# Patient Record
Sex: FEMALE | Race: WHITE | Employment: OTHER | URBAN - METROPOLITAN AREA
[De-identification: names, ages, dates, MRNs, and addresses within clinical notes are randomized per-mention and may not be internally consistent; named-entity substitution may affect disease eponyms.]

---

## 2018-03-19 ENCOUNTER — OFFICE VISIT (OUTPATIENT)
Dept: FAMILY MEDICINE CLINIC | Age: 78
End: 2018-03-19
Payer: MEDICARE

## 2018-03-19 VITALS
SYSTOLIC BLOOD PRESSURE: 134 MMHG | OXYGEN SATURATION: 97 % | RESPIRATION RATE: 16 BRPM | HEART RATE: 70 BPM | DIASTOLIC BLOOD PRESSURE: 70 MMHG | WEIGHT: 191 LBS | TEMPERATURE: 97.9 F | BODY MASS INDEX: 30.7 KG/M2 | HEIGHT: 66 IN

## 2018-03-19 DIAGNOSIS — R49.0 PERSISTENT HOARSENESS: Primary | ICD-10-CM

## 2018-03-19 PROCEDURE — G8484 FLU IMMUNIZE NO ADMIN: HCPCS | Performed by: NURSE PRACTITIONER

## 2018-03-19 PROCEDURE — G8400 PT W/DXA NO RESULTS DOC: HCPCS | Performed by: NURSE PRACTITIONER

## 2018-03-19 PROCEDURE — G8417 CALC BMI ABV UP PARAM F/U: HCPCS | Performed by: NURSE PRACTITIONER

## 2018-03-19 PROCEDURE — 4040F PNEUMOC VAC/ADMIN/RCVD: CPT | Performed by: NURSE PRACTITIONER

## 2018-03-19 PROCEDURE — 3017F COLORECTAL CA SCREEN DOC REV: CPT | Performed by: NURSE PRACTITIONER

## 2018-03-19 PROCEDURE — 1036F TOBACCO NON-USER: CPT | Performed by: NURSE PRACTITIONER

## 2018-03-19 PROCEDURE — 1090F PRES/ABSN URINE INCON ASSESS: CPT | Performed by: NURSE PRACTITIONER

## 2018-03-19 PROCEDURE — 99213 OFFICE O/P EST LOW 20 MIN: CPT | Performed by: NURSE PRACTITIONER

## 2018-03-19 PROCEDURE — G8427 DOCREV CUR MEDS BY ELIG CLIN: HCPCS | Performed by: NURSE PRACTITIONER

## 2018-03-19 PROCEDURE — 1123F ACP DISCUSS/DSCN MKR DOCD: CPT | Performed by: NURSE PRACTITIONER

## 2018-03-19 ASSESSMENT — ENCOUNTER SYMPTOMS
COUGH: 0
SHORTNESS OF BREATH: 0
HEARTBURN: 0
NAUSEA: 0
VOMITING: 0
CONSTIPATION: 0
WHEEZING: 0
DIARRHEA: 0
SORE THROAT: 0

## 2018-03-19 NOTE — PROGRESS NOTES
HPI:  Patient comes in today for   Chief Complaint   Patient presents with    Pharyngitis     pt is here for follow up pt denies any pain states \"it feels tight\"    . Complains of intermittent hoarseness. States her throat is no longer sore but does feel tight at times. Prior to Visit Medications    Medication Sig Taking? Authorizing Provider   lisinopril-hydrochlorothiazide (PRINZIDE;ZESTORETIC) 10-12.5 MG per tablet Take 1 tablet by mouth daily Yes Wilson Wade NP   atorvastatin (LIPITOR) 10 MG tablet Take 1 tablet by mouth daily Yes Aubrey Pedroza DO   albuterol sulfate  (90 BASE) MCG/ACT inhaler Inhale 2 puffs into the lungs every 6 hours as needed for Wheezing Yes Historical Provider, MD   estradiol (ESTRACE) 0.1 MG/GM vaginal cream Place 2 g vaginally daily Yes Historical Provider, MD   vitamin B-12 (CYANOCOBALAMIN) 1000 MCG tablet Take 1,000 mcg by mouth once a week 2x/weekly Yes Historical Provider, MD   aspirin 81 MG chewable tablet Take 81 mg by mouth daily Yes Historical Provider, MD   Omega 3 1000 MG CAPS Take by mouth Yes Historical Provider, MD   Cholecalciferol (VITAMIN D3) 400 UNIT/ML LIQD Take 5 drops by mouth daily Yes Historical Provider, MD   COCONUT OIL PO Take by mouth Yes Historical Provider, MD         No Known Allergies      Review of Systems  Review of Systems   Constitutional: Negative for malaise/fatigue and weight loss. HENT: Positive for congestion. Negative for ear pain and sore throat. Respiratory: Negative for cough, shortness of breath and wheezing. Cardiovascular: Negative for chest pain and palpitations. Gastrointestinal: Negative for constipation, diarrhea, heartburn, nausea and vomiting. Neurological: Positive for dizziness. Negative for weakness and headaches.          VS:  /70   Pulse 70   Temp 97.9 °F (36.6 °C) (Oral)   Resp 16   Ht 5' 6\" (1.676 m)   Wt 191 lb (86.6 kg)   SpO2 97%   BMI 30.83 kg/m²     Physical Exam  Physical

## 2018-04-13 ENCOUNTER — OFFICE VISIT (OUTPATIENT)
Dept: ENT CLINIC | Age: 78
End: 2018-04-13
Payer: MEDICARE

## 2018-04-13 VITALS
WEIGHT: 195.6 LBS | BODY MASS INDEX: 31.43 KG/M2 | SYSTOLIC BLOOD PRESSURE: 123 MMHG | HEART RATE: 62 BPM | DIASTOLIC BLOOD PRESSURE: 55 MMHG | HEIGHT: 66 IN

## 2018-04-13 DIAGNOSIS — R49.0 HOARSENESS: Primary | ICD-10-CM

## 2018-04-13 PROCEDURE — G8417 CALC BMI ABV UP PARAM F/U: HCPCS | Performed by: OTOLARYNGOLOGY

## 2018-04-13 PROCEDURE — 31575 DIAGNOSTIC LARYNGOSCOPY: CPT | Performed by: OTOLARYNGOLOGY

## 2018-04-13 PROCEDURE — 99204 OFFICE O/P NEW MOD 45 MIN: CPT | Performed by: OTOLARYNGOLOGY

## 2018-04-13 PROCEDURE — 3017F COLORECTAL CA SCREEN DOC REV: CPT | Performed by: OTOLARYNGOLOGY

## 2018-04-13 PROCEDURE — G8427 DOCREV CUR MEDS BY ELIG CLIN: HCPCS | Performed by: OTOLARYNGOLOGY

## 2018-04-13 PROCEDURE — 1036F TOBACCO NON-USER: CPT | Performed by: OTOLARYNGOLOGY

## 2018-04-13 PROCEDURE — 1123F ACP DISCUSS/DSCN MKR DOCD: CPT | Performed by: OTOLARYNGOLOGY

## 2018-04-13 PROCEDURE — 4040F PNEUMOC VAC/ADMIN/RCVD: CPT | Performed by: OTOLARYNGOLOGY

## 2018-04-13 PROCEDURE — 1090F PRES/ABSN URINE INCON ASSESS: CPT | Performed by: OTOLARYNGOLOGY

## 2018-04-13 PROCEDURE — G8400 PT W/DXA NO RESULTS DOC: HCPCS | Performed by: OTOLARYNGOLOGY

## 2018-04-13 ASSESSMENT — ENCOUNTER SYMPTOMS
SINUS PAIN: 0
SORE THROAT: 1
COUGH: 0
VOMITING: 0
NAUSEA: 0

## 2018-04-18 ENCOUNTER — OFFICE VISIT (OUTPATIENT)
Dept: FAMILY MEDICINE CLINIC | Age: 78
End: 2018-04-18
Payer: MEDICARE

## 2018-04-18 VITALS
WEIGHT: 197 LBS | RESPIRATION RATE: 18 BRPM | OXYGEN SATURATION: 97 % | HEIGHT: 66 IN | BODY MASS INDEX: 31.66 KG/M2 | TEMPERATURE: 98.1 F | HEART RATE: 82 BPM | DIASTOLIC BLOOD PRESSURE: 82 MMHG | SYSTOLIC BLOOD PRESSURE: 124 MMHG

## 2018-04-18 DIAGNOSIS — Z23 IMMUNIZATION DUE: ICD-10-CM
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
## 2018-04-18 PROCEDURE — G8400 PT W/DXA NO RESULTS DOC: HCPCS | Performed by: NURSE PRACTITIONER

## 2018-04-18 PROCEDURE — 1090F PRES/ABSN URINE INCON ASSESS: CPT | Performed by: NURSE PRACTITIONER

## 2018-04-18 PROCEDURE — 4040F PNEUMOC VAC/ADMIN/RCVD: CPT | Performed by: NURSE PRACTITIONER

## 2018-04-18 PROCEDURE — G8427 DOCREV CUR MEDS BY ELIG CLIN: HCPCS | Performed by: NURSE PRACTITIONER

## 2018-04-18 PROCEDURE — 1123F ACP DISCUSS/DSCN MKR DOCD: CPT | Performed by: NURSE PRACTITIONER

## 2018-04-18 PROCEDURE — 99213 OFFICE O/P EST LOW 20 MIN: CPT | Performed by: NURSE PRACTITIONER

## 2018-04-18 PROCEDURE — 1036F TOBACCO NON-USER: CPT | Performed by: NURSE PRACTITIONER

## 2018-04-18 PROCEDURE — 90715 TDAP VACCINE 7 YRS/> IM: CPT | Performed by: NURSE PRACTITIONER

## 2018-04-18 PROCEDURE — G8417 CALC BMI ABV UP PARAM F/U: HCPCS | Performed by: NURSE PRACTITIONER

## 2018-04-18 PROCEDURE — 90471 IMMUNIZATION ADMIN: CPT | Performed by: NURSE PRACTITIONER

## 2018-04-18 PROCEDURE — 3017F COLORECTAL CA SCREEN DOC REV: CPT | Performed by: NURSE PRACTITIONER

## 2018-04-18 ASSESSMENT — ENCOUNTER SYMPTOMS
COUGH: 0
CONSTIPATION: 0
WHEEZING: 0
DOUBLE VISION: 0
DIARRHEA: 0
SHORTNESS OF BREATH: 0
PHOTOPHOBIA: 0
BLURRED VISION: 0
NAUSEA: 0
VOMITING: 0

## 2018-04-26 ASSESSMENT — ENCOUNTER SYMPTOMS
SHORTNESS OF BREATH: 0
HEARTBURN: 0
VOMITING: 0
BLURRED VISION: 0
DOUBLE VISION: 0
COUGH: 0

## 2018-06-12 ENCOUNTER — OFFICE VISIT (OUTPATIENT)
Dept: FAMILY MEDICINE CLINIC | Age: 78
End: 2018-06-12
Payer: MEDICARE

## 2018-06-12 VITALS
WEIGHT: 198 LBS | TEMPERATURE: 98 F | RESPIRATION RATE: 16 BRPM | DIASTOLIC BLOOD PRESSURE: 68 MMHG | BODY MASS INDEX: 31.82 KG/M2 | HEART RATE: 69 BPM | SYSTOLIC BLOOD PRESSURE: 126 MMHG | HEIGHT: 66 IN | OXYGEN SATURATION: 97 %

## 2018-06-12 DIAGNOSIS — Z91.81 AT HIGH RISK FOR FALLS: ICD-10-CM

## 2018-06-12 DIAGNOSIS — H57.11 PERIORBITAL PAIN, RIGHT: Primary | ICD-10-CM

## 2018-06-12 PROCEDURE — 4040F PNEUMOC VAC/ADMIN/RCVD: CPT | Performed by: NURSE PRACTITIONER

## 2018-06-12 PROCEDURE — 1036F TOBACCO NON-USER: CPT | Performed by: NURSE PRACTITIONER

## 2018-06-12 PROCEDURE — G8400 PT W/DXA NO RESULTS DOC: HCPCS | Performed by: NURSE PRACTITIONER

## 2018-06-12 PROCEDURE — 3017F COLORECTAL CA SCREEN DOC REV: CPT | Performed by: NURSE PRACTITIONER

## 2018-06-12 PROCEDURE — 99213 OFFICE O/P EST LOW 20 MIN: CPT | Performed by: NURSE PRACTITIONER

## 2018-06-12 PROCEDURE — G8427 DOCREV CUR MEDS BY ELIG CLIN: HCPCS | Performed by: NURSE PRACTITIONER

## 2018-06-12 PROCEDURE — 1090F PRES/ABSN URINE INCON ASSESS: CPT | Performed by: NURSE PRACTITIONER

## 2018-06-12 PROCEDURE — G8417 CALC BMI ABV UP PARAM F/U: HCPCS | Performed by: NURSE PRACTITIONER

## 2018-06-12 PROCEDURE — 1123F ACP DISCUSS/DSCN MKR DOCD: CPT | Performed by: NURSE PRACTITIONER

## 2018-06-12 ASSESSMENT — ENCOUNTER SYMPTOMS
WHEEZING: 0
CONSTIPATION: 0
SHORTNESS OF BREATH: 0
NAUSEA: 0
EYE REDNESS: 0
BACK PAIN: 0
DOUBLE VISION: 0
COUGH: 0
DIARRHEA: 0
EYE PAIN: 1
BLURRED VISION: 0
EYE DISCHARGE: 0
VOMITING: 0

## 2018-06-13 ENCOUNTER — HOSPITAL ENCOUNTER (OUTPATIENT)
Dept: GENERAL RADIOLOGY | Age: 78
Discharge: HOME | End: 2018-06-15
Payer: MEDICARE

## 2018-06-13 ENCOUNTER — HOSPITAL ENCOUNTER (OUTPATIENT)
Age: 78
Discharge: HOME | End: 2018-06-15
Payer: MEDICARE

## 2018-06-13 DIAGNOSIS — H57.11 PERIORBITAL PAIN, RIGHT: ICD-10-CM

## 2018-06-13 PROCEDURE — 70150 X-RAY EXAM OF FACIAL BONES: CPT

## 2018-08-17 ENCOUNTER — TELEPHONE (OUTPATIENT)
Dept: FAMILY MEDICINE CLINIC | Age: 78
End: 2018-08-17

## 2018-08-17 DIAGNOSIS — Z12.31 ENCOUNTER FOR SCREENING MAMMOGRAM FOR MALIGNANT NEOPLASM OF BREAST: Primary | ICD-10-CM

## 2018-08-24 ENCOUNTER — HOSPITAL ENCOUNTER (OUTPATIENT)
Dept: MAMMOGRAPHY | Age: 78
Discharge: HOME | End: 2018-08-26
Payer: MEDICARE

## 2018-08-24 DIAGNOSIS — Z12.31 ENCOUNTER FOR SCREENING MAMMOGRAM FOR MALIGNANT NEOPLASM OF BREAST: ICD-10-CM

## 2018-08-24 PROCEDURE — 77063 BREAST TOMOSYNTHESIS BI: CPT

## 2018-09-24 ENCOUNTER — OFFICE VISIT (OUTPATIENT)
Dept: FAMILY MEDICINE CLINIC | Age: 78
End: 2018-09-24
Payer: MEDICARE

## 2018-09-24 ENCOUNTER — HOSPITAL ENCOUNTER (OUTPATIENT)
Age: 78
Discharge: HOME | End: 2018-09-24
Payer: MEDICARE

## 2018-09-24 VITALS
SYSTOLIC BLOOD PRESSURE: 122 MMHG | RESPIRATION RATE: 14 BRPM | DIASTOLIC BLOOD PRESSURE: 72 MMHG | BODY MASS INDEX: 31.66 KG/M2 | TEMPERATURE: 97.7 F | HEART RATE: 70 BPM | HEIGHT: 66 IN | WEIGHT: 197 LBS | OXYGEN SATURATION: 97 %

## 2018-09-24 DIAGNOSIS — I10 ESSENTIAL HYPERTENSION: ICD-10-CM

## 2018-09-24 DIAGNOSIS — Z00.00 ROUTINE GENERAL MEDICAL EXAMINATION AT A HEALTH CARE FACILITY: ICD-10-CM

## 2018-09-24 DIAGNOSIS — E78.2 MIXED HYPERLIPIDEMIA: ICD-10-CM

## 2018-09-24 DIAGNOSIS — Z23 IMMUNIZATION DUE: ICD-10-CM

## 2018-09-24 DIAGNOSIS — I10 ESSENTIAL HYPERTENSION: Primary | ICD-10-CM

## 2018-09-24 LAB
BASOPHILS ABSOLUTE: 0.02 E9/L (ref 0–0.2)
BASOPHILS RELATIVE PERCENT: 0.4 % (ref 0–2)
CHOLESTEROL, TOTAL: 181 MG/DL (ref 0–199)
EOSINOPHILS ABSOLUTE: 0.07 E9/L (ref 0.05–0.5)
EOSINOPHILS RELATIVE PERCENT: 1.6 % (ref 0–6)
HCT VFR BLD CALC: 40.7 % (ref 34–48)
HDLC SERPL-MCNC: 64 MG/DL
HEMOGLOBIN: 13.6 G/DL (ref 11.5–15.5)
IMMATURE GRANULOCYTES #: 0.01 E9/L
IMMATURE GRANULOCYTES %: 0.2 % (ref 0–5)
LDL CHOLESTEROL CALCULATED: 96 MG/DL (ref 0–99)
LYMPHOCYTES ABSOLUTE: 1.08 E9/L (ref 1.5–4)
LYMPHOCYTES RELATIVE PERCENT: 24 % (ref 20–42)
MCH RBC QN AUTO: 29.1 PG (ref 26–35)
MCHC RBC AUTO-ENTMCNC: 33.4 % (ref 32–34.5)
MCV RBC AUTO: 87 FL (ref 80–99.9)
MONOCYTES ABSOLUTE: 0.43 E9/L (ref 0.1–0.95)
MONOCYTES RELATIVE PERCENT: 9.6 % (ref 2–12)
NEUTROPHILS ABSOLUTE: 2.89 E9/L (ref 1.8–7.3)
NEUTROPHILS RELATIVE PERCENT: 64.2 % (ref 43–80)
PDW BLD-RTO: 12.8 FL (ref 11.5–15)
PLATELET # BLD: 189 E9/L (ref 130–450)
PMV BLD AUTO: 11.1 FL (ref 7–12)
RBC # BLD: 4.68 E12/L (ref 3.5–5.5)
T4 FREE: 1.27 NG/DL (ref 0.93–1.7)
TRIGL SERPL-MCNC: 103 MG/DL (ref 0–149)
TSH SERPL DL<=0.05 MIU/L-ACNC: 2.26 UIU/ML (ref 0.27–4.2)
VLDLC SERPL CALC-MCNC: 21 MG/DL
WBC # BLD: 4.5 E9/L (ref 4.5–11.5)

## 2018-09-24 PROCEDURE — 80061 LIPID PANEL: CPT

## 2018-09-24 PROCEDURE — 84443 ASSAY THYROID STIM HORMONE: CPT

## 2018-09-24 PROCEDURE — 85025 COMPLETE CBC W/AUTO DIFF WBC: CPT

## 2018-09-24 PROCEDURE — G0008 ADMIN INFLUENZA VIRUS VAC: HCPCS | Performed by: FAMILY MEDICINE

## 2018-09-24 PROCEDURE — G0438 PPPS, INITIAL VISIT: HCPCS | Performed by: FAMILY MEDICINE

## 2018-09-24 PROCEDURE — 90662 IIV NO PRSV INCREASED AG IM: CPT | Performed by: FAMILY MEDICINE

## 2018-09-24 PROCEDURE — 36415 COLL VENOUS BLD VENIPUNCTURE: CPT

## 2018-09-24 PROCEDURE — 4040F PNEUMOC VAC/ADMIN/RCVD: CPT | Performed by: FAMILY MEDICINE

## 2018-09-24 PROCEDURE — 84439 ASSAY OF FREE THYROXINE: CPT

## 2018-09-24 ASSESSMENT — ANXIETY QUESTIONNAIRES: GAD7 TOTAL SCORE: 0

## 2018-09-24 ASSESSMENT — PATIENT HEALTH QUESTIONNAIRE - PHQ9
SUM OF ALL RESPONSES TO PHQ QUESTIONS 1-9: 0
SUM OF ALL RESPONSES TO PHQ QUESTIONS 1-9: 0

## 2018-09-24 NOTE — PROGRESS NOTES
Medicare Annual Wellness Visit  Name: Augusta Ac Date: 2018   MRN: <[id number]> Sex: Female   Age: 78 y.o. Ethnicity: Non-/Non    : 1940 Race: Matt Rivers is here for Medicare AWV    Screenings for behavioral, psychosocial and functional/safety risks, and cognitive dysfunction are all negative except as indicated below. These results, as well as other patient data from the [address] [facility] [address] form, are documented in Flowsheets linked to this Encounter. No Known Allergies  Prior to Visit Medications    Medication Sig Taking? Authorizing Provider   SODIUM CHLORIDE, HYPERTONIC, OP Apply to eye daily Yes Historical Provider, MD   lisinopril-hydrochlorothiazide (PRINZIDE;ZESTORETIC) 10-12.5 MG per tablet Take 1 tablet by mouth daily Yes MARVIN Toledo - CNP   atorvastatin (LIPITOR) 10 MG tablet Take 1 tablet by mouth daily Yes Cyndie Espinosa DO   albuterol sulfate  (90 BASE) MCG/ACT inhaler Inhale 2 puffs into the lungs every 6 hours as needed for Wheezing Yes Historical Provider, MD   vitamin B-12 (CYANOCOBALAMIN) 1000 MCG tablet Take 1,000 mcg by mouth once a week 2x/weekly Yes Historical Provider, MD   aspirin 81 MG chewable tablet Take 81 mg by mouth daily Yes Historical Provider, MD   Omega 3 1000 MG CAPS Take by mouth Yes Historical Provider, MD   Cholecalciferol (VITAMIN D3) 400 UNIT/ML LIQD Take 5 drops by mouth daily Yes Historical Provider, MD   COCONUT OIL PO Take by mouth Yes Historical Provider, MD   estradiol (ESTRACE) 0.1 MG/GM vaginal cream Place 2 g vaginally daily  Historical Provider, MD     Past Medical History:   Diagnosis Date    Asthma     Hernia     Hypertension      Past Surgical History:   Procedure Laterality Date    BLADDER SURGERY      DILATION AND CURETTAGE OF UTERUS      FRACTURE SURGERY      right arm    HYSTERECTOMY      KNEE CARTILAGE SURGERY       No family history on file.     CareTeam (Including ordered. Positive Risk Factor Screenings with Interventions:     Health Habits/Nutrition:  Health Habits/Nutrition  Do you exercise for at least 20 minutes 2-3 times per week?: (!) No  Have you lost any weight without trying in the past 3 months?: No  Do you eat fewer than 2 meals per day?: No  Have you seen a dentist within the past year?: Yes  Body mass index is 31.8 kg/m². Health Habits/Nutrition Interventions:  · None indicated    Personalized Preventive Plan   Current Health Maintenance Status  Immunization History   Administered Date(s) Administered    Influenza Vaccine, unspecified formulation 08/31/2017    Influenza, High Dose (Fluzone 65 yrs and older) 11/02/2015, 09/19/2016    Pneumococcal 13-valent Conjugate ([id number]) 03/04/2016    Pneumococcal Polysaccharide ([id number]) 09/20/2017    Tdap (Boostrix, Adacel) 04/18/2018        Health Maintenance   Topic Date Due    Shingles Vaccine (1 of 2 - 2 Dose Series) 09/25/1992    DEXA (modify frequency per FRAX score)  09/25/2007    Flu vaccine (1) 09/01/2018    Potassium monitoring  10/05/2018    Creatinine monitoring  10/05/2018    Lipid screen  10/05/2022    Colon cancer screen colonoscopy  12/16/2026    DTaP/Tdap/Td vaccine (2 - Td) 04/18/2028    Pneumococcal low/med risk  Completed     Recommendations for Preventive Services Due: see orders and patient instructions/AVS.  .   Recommended screening schedule for the next 5-10 years is provided to the patient in written form: see Patient Instructions/AVS.

## 2018-11-26 DIAGNOSIS — E78.00 PURE HYPERCHOLESTEROLEMIA: ICD-10-CM

## 2018-11-26 RX ORDER — ATORVASTATIN CALCIUM 10 MG/1
10 TABLET, FILM COATED ORAL DAILY
Qty: 90 TABLET | Refills: 3 | Status: SHIPPED | OUTPATIENT
Start: 2018-11-26 | End: 2019-12-30 | Stop reason: SDUPTHER

## 2019-01-07 ENCOUNTER — OFFICE VISIT (OUTPATIENT)
Dept: FAMILY MEDICINE CLINIC | Age: 79
End: 2019-01-07
Payer: MEDICARE

## 2019-01-07 VITALS
DIASTOLIC BLOOD PRESSURE: 80 MMHG | TEMPERATURE: 98.6 F | OXYGEN SATURATION: 95 % | BODY MASS INDEX: 31.5 KG/M2 | HEIGHT: 66 IN | SYSTOLIC BLOOD PRESSURE: 128 MMHG | HEART RATE: 82 BPM | WEIGHT: 196 LBS

## 2019-01-07 DIAGNOSIS — J32.9 CHRONIC SINUSITIS, UNSPECIFIED LOCATION: Primary | ICD-10-CM

## 2019-01-07 PROCEDURE — 1123F ACP DISCUSS/DSCN MKR DOCD: CPT | Performed by: NURSE PRACTITIONER

## 2019-01-07 PROCEDURE — G8482 FLU IMMUNIZE ORDER/ADMIN: HCPCS | Performed by: NURSE PRACTITIONER

## 2019-01-07 PROCEDURE — 1101F PT FALLS ASSESS-DOCD LE1/YR: CPT | Performed by: NURSE PRACTITIONER

## 2019-01-07 PROCEDURE — 4040F PNEUMOC VAC/ADMIN/RCVD: CPT | Performed by: NURSE PRACTITIONER

## 2019-01-07 PROCEDURE — 1036F TOBACCO NON-USER: CPT | Performed by: NURSE PRACTITIONER

## 2019-01-07 PROCEDURE — G8400 PT W/DXA NO RESULTS DOC: HCPCS | Performed by: NURSE PRACTITIONER

## 2019-01-07 PROCEDURE — G8427 DOCREV CUR MEDS BY ELIG CLIN: HCPCS | Performed by: NURSE PRACTITIONER

## 2019-01-07 PROCEDURE — 99213 OFFICE O/P EST LOW 20 MIN: CPT | Performed by: NURSE PRACTITIONER

## 2019-01-07 PROCEDURE — 1090F PRES/ABSN URINE INCON ASSESS: CPT | Performed by: NURSE PRACTITIONER

## 2019-01-07 PROCEDURE — G8417 CALC BMI ABV UP PARAM F/U: HCPCS | Performed by: NURSE PRACTITIONER

## 2019-01-07 ASSESSMENT — ENCOUNTER SYMPTOMS
CONSTIPATION: 0
WHEEZING: 0
SINUS PRESSURE: 0
NAUSEA: 0
SHORTNESS OF BREATH: 0
SINUS PAIN: 0
SORE THROAT: 0
COUGH: 1
VOMITING: 0
DIARRHEA: 0

## 2019-03-11 DIAGNOSIS — I10 ESSENTIAL HYPERTENSION: ICD-10-CM

## 2019-03-11 RX ORDER — LISINOPRIL AND HYDROCHLOROTHIAZIDE 12.5; 1 MG/1; MG/1
1 TABLET ORAL DAILY
Qty: 90 TABLET | Refills: 3 | Status: SHIPPED
Start: 2019-03-11 | End: 2020-03-16 | Stop reason: SDUPTHER

## 2019-04-03 ENCOUNTER — TELEPHONE (OUTPATIENT)
Dept: ADMINISTRATIVE | Age: 79
End: 2019-04-03

## 2019-04-03 ENCOUNTER — OFFICE VISIT (OUTPATIENT)
Dept: FAMILY MEDICINE CLINIC | Age: 79
End: 2019-04-03
Payer: MEDICARE

## 2019-04-03 VITALS
TEMPERATURE: 97.9 F | OXYGEN SATURATION: 96 % | DIASTOLIC BLOOD PRESSURE: 82 MMHG | BODY MASS INDEX: 30.95 KG/M2 | RESPIRATION RATE: 16 BRPM | SYSTOLIC BLOOD PRESSURE: 122 MMHG | WEIGHT: 192.6 LBS | HEIGHT: 66 IN | HEART RATE: 83 BPM

## 2019-04-03 DIAGNOSIS — R68.89 THROAT SYMPTOM: Primary | ICD-10-CM

## 2019-04-03 DIAGNOSIS — R05.9 COUGH: ICD-10-CM

## 2019-04-03 PROCEDURE — 99213 OFFICE O/P EST LOW 20 MIN: CPT | Performed by: NURSE PRACTITIONER

## 2019-04-03 PROCEDURE — 1090F PRES/ABSN URINE INCON ASSESS: CPT | Performed by: NURSE PRACTITIONER

## 2019-04-03 PROCEDURE — 4040F PNEUMOC VAC/ADMIN/RCVD: CPT | Performed by: NURSE PRACTITIONER

## 2019-04-03 PROCEDURE — G8510 SCR DEP NEG, NO PLAN REQD: HCPCS | Performed by: NURSE PRACTITIONER

## 2019-04-03 PROCEDURE — G8417 CALC BMI ABV UP PARAM F/U: HCPCS | Performed by: NURSE PRACTITIONER

## 2019-04-03 PROCEDURE — 1123F ACP DISCUSS/DSCN MKR DOCD: CPT | Performed by: NURSE PRACTITIONER

## 2019-04-03 PROCEDURE — 1036F TOBACCO NON-USER: CPT | Performed by: NURSE PRACTITIONER

## 2019-04-03 PROCEDURE — G8427 DOCREV CUR MEDS BY ELIG CLIN: HCPCS | Performed by: NURSE PRACTITIONER

## 2019-04-03 PROCEDURE — G8400 PT W/DXA NO RESULTS DOC: HCPCS | Performed by: NURSE PRACTITIONER

## 2019-04-03 ASSESSMENT — ENCOUNTER SYMPTOMS
DIARRHEA: 0
TROUBLE SWALLOWING: 1
WHEEZING: 0
CONSTIPATION: 0
SHORTNESS OF BREATH: 0
COUGH: 1
VOMITING: 0
NAUSEA: 0

## 2019-04-03 ASSESSMENT — PATIENT HEALTH QUESTIONNAIRE - PHQ9
2. FEELING DOWN, DEPRESSED OR HOPELESS: 0
SUM OF ALL RESPONSES TO PHQ QUESTIONS 1-9: 0
SUM OF ALL RESPONSES TO PHQ9 QUESTIONS 1 & 2: 0
SUM OF ALL RESPONSES TO PHQ QUESTIONS 1-9: 0
1. LITTLE INTEREST OR PLEASURE IN DOING THINGS: 0

## 2019-04-03 NOTE — PROGRESS NOTES
HPI:  Patient comes intoday for   Chief Complaint   Patient presents with    Oral Swelling     fish oil pill got stuck in throat, still feels like it is stuck in throat. happened yesterday. .    States the capsule got stuck yesterday and still feels like there is something there when she swallows. Patient brought a pill like the one that is stuck    Prior to Visit Medications    Medication Sig Taking? Authorizing Provider   lisinopril-hydrochlorothiazide (PRINZIDE;ZESTORETIC) 10-12.5 MG per tablet Take 1 tablet by mouth daily Yes Micky Miguel,    atorvastatin (LIPITOR) 10 MG tablet Take 1 tablet by mouth daily Yes MARVIN Sidhu - CNP   albuterol sulfate  (90 BASE) MCG/ACT inhaler Inhale 2 puffs into the lungs every 6 hours as needed for Wheezing Yes Historical Provider, MD   vitamin B-12 (CYANOCOBALAMIN) 1000 MCG tablet Take 1,000 mcg by mouth once a week 2x/weekly Yes Historical Provider, MD   aspirin 81 MG chewable tablet Take 81 mg by mouth daily Yes Historical Provider, MD   Omega 3 1000 MG CAPS Take by mouth Yes Historical Provider, MD   Cholecalciferol (VITAMIN D3) 400 UNIT/ML LIQD Take 5 drops by mouth daily Yes Historical Provider, MD   COCONUT OIL PO Take by mouth Yes Historical Provider, MD         No Known Allergies      Review of Systems  Review of Systems   Constitutional: Negative for activity change, appetite change and unexpected weight change. HENT: Positive for congestion and trouble swallowing (able to swallow, just feels like there is something stuck on the left). Respiratory: Positive for cough (with recent sinus congestion). Negative for shortness of breath and wheezing. Cardiovascular: Negative for chest pain and palpitations. Gastrointestinal: Negative for constipation, diarrhea, nausea and vomiting. Neurological: Positive for headaches (intermittently). Negative for weakness and light-headedness.          VS:  /82   Pulse 83   Temp 97.9 °F (36.6 °C) (Oral)   Resp 16   Ht 5' 6\" (1.676 m)   Wt 192 lb 9.6 oz (87.4 kg)   SpO2 96%   BMI 31.09 kg/m²     Physical Exam  Physical Exam   Constitutional: She is oriented to person, place, and time. She appears well-developed and well-nourished. No distress. HENT:   Head: Normocephalic and atraumatic. Mouth/Throat: Oropharynx is clear and moist.   Neck: No tracheal deviation present. No thyromegaly present. Cardiovascular: Normal rate, regular rhythm, normal heart sounds and intact distal pulses. No murmur heard. Pulmonary/Chest: Effort normal and breath sounds normal. No respiratory distress. She has no wheezes. She has no rales. She exhibits no tenderness. Abdominal: Soft. Bowel sounds are normal. There is no tenderness. Lymphadenopathy:     She has no cervical adenopathy. Neurological: She is alert and oriented to person, place, and time. Skin: Skin is warm and dry. Psychiatric: She has a normal mood and affect. Her behavior is normal.     Patient also examined by Dr. Jung Flowers      Assessment/Plan:  Sylvia Barnes was seen today for oral swelling. Diagnoses and all orders for this visit:    Throat symptom  -fish oil suspended in water, does not dissolve. Broke capsule open and contents empties but shell remain firm and intact. -contacted Dr. Helms Heads office, as she is established with that practice, and was advised if she can swallow there is nothing stuck and she did not need to be seen.   If it was in the esophagus that would be GI  -advised patient that if she develops difficulty swallowing or breathing she is to go to ER    Cough  -will try sinus medication and if does not improve she is to contact the office for change in BP medications        Greater than 15  Minutes was spent with patient and more than 50% of the time was spent face to facecounseling and educating regarding diagnoses

## 2019-04-03 NOTE — TELEPHONE ENCOUNTER
Received phone call from staff at Dr. Cassia Isabel office stating patient is currently in there office and had swallowed a fish oil pill that got stuck in her throat and since patient was established here they were inquiring if Dr. Dell Kurtz would see patient; discussed with Dr. Dell Kurtz who stated if patient is swallowing saliva/able to spit that it has gone down it would just feel as If it's lodged in the throat that the feeling would go away but if she is unable to swallow spit and is in the esophagus pt would need to see gastro. Staff at Dr. Cassia Isabel office was notified.

## 2019-05-27 ENCOUNTER — HOSPITAL ENCOUNTER (EMERGENCY)
Age: 79
Discharge: HOME | End: 2019-05-27
Payer: MEDICARE

## 2019-05-27 VITALS
HEIGHT: 66 IN | SYSTOLIC BLOOD PRESSURE: 137 MMHG | TEMPERATURE: 98.6 F | OXYGEN SATURATION: 96 % | WEIGHT: 192 LBS | RESPIRATION RATE: 20 BRPM | HEART RATE: 82 BPM | BODY MASS INDEX: 30.86 KG/M2 | DIASTOLIC BLOOD PRESSURE: 68 MMHG

## 2019-05-27 DIAGNOSIS — S61.210A LACERATION OF RIGHT INDEX FINGER WITHOUT FOREIGN BODY WITHOUT DAMAGE TO NAIL, INITIAL ENCOUNTER: Primary | ICD-10-CM

## 2019-05-27 PROCEDURE — 12001 RPR S/N/AX/GEN/TRNK 2.5CM/<: CPT

## 2019-05-27 PROCEDURE — 90471 IMMUNIZATION ADMIN: CPT | Performed by: PHYSICIAN ASSISTANT

## 2019-05-27 PROCEDURE — 99282 EMERGENCY DEPT VISIT SF MDM: CPT

## 2019-05-27 PROCEDURE — 90715 TDAP VACCINE 7 YRS/> IM: CPT | Performed by: PHYSICIAN ASSISTANT

## 2019-05-27 PROCEDURE — 6360000002 HC RX W HCPCS: Performed by: PHYSICIAN ASSISTANT

## 2019-05-27 RX ADMIN — TETANUS TOXOID, REDUCED DIPHTHERIA TOXOID AND ACELLULAR PERTUSSIS VACCINE, ADSORBED 0.5 ML: 5; 2.5; 8; 8; 2.5 SUSPENSION INTRAMUSCULAR at 14:21

## 2019-05-27 NOTE — ED PROVIDER NOTES
Independent MLP      HPI:  5/27/19,   Time: 2:10 PM         Sharron Gomez is a 70 y.o. female presenting to the ED for laceration to right index finger, beginning few hours ago. The complaint has been persistent, moderate in severity, and worsened by nothing. Patient presents today after cutting her right index finger on a sharp blade. She states that she had one of the old fashion straightedge cutters and had removed the old cassie and inadvertently set it on her bedside table. She states that she reached over this afternoon and grabbed the blade with her right hand cutting the pad of her right index finger. The patient states she hasn't been able to stop bleeding. She is on Veronica aspirin. Moving the finger well. Denies loss of sensation. ROS:     Constitutional: Negative for fever and chills  HENT: Negative for ear pain, sore throat and sinus pressure  Eyes: Negative for pain, discharge and redness  Respiratory:  Negative for shortness of breath, cough and wheezing  Cardiovascular: Negative for CP, edema or palpitations  Gastrointestinal: Negative for nausea, vomiting, diarrhea and abdominal distention  Genitourinary: Negative for dysuria and frequency  Musculoskeletal:  See HPI  Skin: See HPI  Neurological: Negative for weakness and headaches  Hematological: Negative for adenopathy    All other systems reviewed and are negative      -------------------------------- PAST HISTORY ----------------------------------  Past Medical History:  has a past medical history of Asthma, Hernia, and Hypertension. Past Surgical History:  has a past surgical history that includes Hysterectomy; Knee cartilage surgery; Bladder surgery; Dilation and curettage of uterus; and fracture surgery. Social History:  reports that she quit smoking about 39 years ago. Her smoking use included cigarettes. She has a 8.00 pack-year smoking history.  She has never used smokeless tobacco. She reports that she does not drink alcohol or use drugs. Family History: family history is not on file. The patients home medications have been reviewed. Allergies: Patient has no known allergies. --------------------------------- RESULTS ------------------------------------------  All laboratory and radiology results have been personally reviewed by myself   LABS:  No results found for this visit on 05/27/19. RADIOLOGY:  Interpreted by Radiologist.  No orders to display       ----------------- NURSING NOTES AND VITALS REVIEWED ---------------   The nursing notes within the ED encounter and vital signs as below have been reviewed. /68   Pulse 82   Temp 98.6 °F (37 °C)   Resp 20   Ht 5' 6\" (1.676 m)   Wt 192 lb (87.1 kg)   SpO2 96%   BMI 30.99 kg/m²   Oxygen Saturation Interpretation: Normal      --------------------------------PHYSICAL EXAM------------------------------------      Constitutional/General: Alert and oriented x3, well appearing, non toxic in NAD  Head: NC/AT  Eyes: PERRL, EOMI  Mouth: Oropharynx clear, handling secretions, no trismus  Neck: Supple, full ROM, no meningeal signs  Pulmonary: Lungs clear to auscultation bilaterally, no wheezes, rales, or rhonchi. Not in respiratory distress  Cardiovascular:  Regular rate and rhythm, no murmurs, gallops, or rubs. 2+ distal pulses  Extremities: Moves all extremities x 4. Bleeding laceration distal palmar surface right index finger. Gaping noted as well. ROM DIP intact. Normal sensation. Warm and well perfused  Skin: See above  Neurologic: GCS 15,  Intact. No focal deficits  Psych: Normal Affect      ------------------------ ED COURSE/MEDICAL DECISION MAKING----------------------  Medications   Tetanus-Diphth-Acell Pertussis (BOOSTRIX) injection 0.5 mL (0.5 mLs Intramuscular Given 5/27/19 1421)       Procedure:   1 cm laceration distal palmar surface right index finger. Area cleansed with Betadine and numbed with 1 cc 1 % Lidocaine.    3 4-0 Ethilon sutures placed. Tolerated well. DSD applied. Performed by [facility]      Medical Decision Making:    See procedure. Discussed wound care. Td updated. Sutures out I 10-14 days time. F/U PCP for suture removal in 10-14 days       Counseling: The emergency provider has spoken with the patient and discussed todays results, in addition to providing specific details for the plan of care and counseling regarding the diagnosis and prognosis. Questions are answered at this time and they are agreeable with the plan.      ------------------------ IMPRESSION AND DISPOSITION -------------------------------    IMPRESSION  1.  Laceration of right index finger without foreign body without damage to nail, initial encounter        DISPOSITION  Disposition: Discharge to home  Patient condition is stable                   Donal Morocho PA-C  05/27/19 [id number]

## 2019-05-27 NOTE — ED NOTES
The wound was cleansed after being sutured. A thin layer of antibiotic ointment was applied and covered with a DSD. Tubex gauze was placed over the dressing. CMS intact.       Salvador Myles RN  05/27/19 [id number]

## 2019-05-28 ENCOUNTER — CARE COORDINATION (OUTPATIENT)
Dept: CARE COORDINATION | Age: 79
End: 2019-05-28

## 2019-05-28 NOTE — CARE COORDINATION
Ambulatory Care Coordination ED Follow up Call  Spoke with patient at [phone] (H)(M) identified myself RN with [facility] ([facility]), calling to see how patient is doing after being discharged from the ED. Reason for ED Visit:  Laceration  Diagnosis:  Laceration of right index finger without foreign body without damage to nail, initial encounter  Care Management Risk Score:  2  How are you feeling?:improved  Patient Reports the following:  Patient states she went to the ED as she accidentally cut her pointer finger on the right had with a razor blade and it was bleeding. Patient states she only has pain at the laceration site if she touches the area. Did you call your PCP prior to going to the ED? No was the holiday. Post Discharge Status:   What health concerns do you have since you left the Emergency Room?  none      Do you have wounds that you are caring for at home? Patient states she received 3 stitches and a dressing was applied. Patient states she was instructed to remove the dressing today and apply a band-aide. Patient states band-aide is clean dry and intact. Do you have a follow up appt scheduled? no    Patient transferred to pre-service to schedule ED follow up appointment:  Yes    Review of Instructions:   Do you have any questions regarding your discharge instructions?: No    Medications:   Do you have any questions about your medications? No new medications were ordered. Are you taking your medications as directed? If not-why? No new medications were ordered. Can you afford your medications? No new medications were ordered. ADLS:   Do you need assistance of any kind at home? No   What assistance is needed?   N/A        Keep scheduled appointment as listed below:     Future Appointments   Date Time Provider Lashonda Cervantes   9/25/2019  8:00 AM Theron Palumbo DO [facility]

## 2019-06-06 ENCOUNTER — HOSPITAL ENCOUNTER (OUTPATIENT)
Dept: GENERAL RADIOLOGY | Age: 79
Discharge: HOME | End: 2019-06-08
Payer: MEDICARE

## 2019-06-06 ENCOUNTER — OFFICE VISIT (OUTPATIENT)
Dept: FAMILY MEDICINE CLINIC | Age: 79
End: 2019-06-06
Payer: MEDICARE

## 2019-06-06 ENCOUNTER — HOSPITAL ENCOUNTER (OUTPATIENT)
Age: 79
Discharge: HOME | End: 2019-06-06
Payer: MEDICARE

## 2019-06-06 VITALS
DIASTOLIC BLOOD PRESSURE: 70 MMHG | WEIGHT: 190 LBS | HEART RATE: 57 BPM | RESPIRATION RATE: 16 BRPM | BODY MASS INDEX: 30.53 KG/M2 | HEIGHT: 66 IN | TEMPERATURE: 98.3 F | OXYGEN SATURATION: 93 % | SYSTOLIC BLOOD PRESSURE: 128 MMHG

## 2019-06-06 DIAGNOSIS — M25.551 PAIN OF RIGHT HIP JOINT: ICD-10-CM

## 2019-06-06 DIAGNOSIS — Z48.02 ENCOUNTER FOR REMOVAL OF SUTURES: Primary | ICD-10-CM

## 2019-06-06 DIAGNOSIS — M76.31 ILIOTIBIAL BAND SYNDROME OF RIGHT SIDE: ICD-10-CM

## 2019-06-06 PROCEDURE — G8417 CALC BMI ABV UP PARAM F/U: HCPCS | Performed by: FAMILY MEDICINE

## 2019-06-06 PROCEDURE — 4040F PNEUMOC VAC/ADMIN/RCVD: CPT | Performed by: FAMILY MEDICINE

## 2019-06-06 PROCEDURE — 1090F PRES/ABSN URINE INCON ASSESS: CPT | Performed by: FAMILY MEDICINE

## 2019-06-06 PROCEDURE — G8400 PT W/DXA NO RESULTS DOC: HCPCS | Performed by: FAMILY MEDICINE

## 2019-06-06 PROCEDURE — 73502 X-RAY EXAM HIP UNI 2-3 VIEWS: CPT

## 2019-06-06 PROCEDURE — 1036F TOBACCO NON-USER: CPT | Performed by: FAMILY MEDICINE

## 2019-06-06 PROCEDURE — 99214 OFFICE O/P EST MOD 30 MIN: CPT | Performed by: FAMILY MEDICINE

## 2019-06-06 PROCEDURE — 1123F ACP DISCUSS/DSCN MKR DOCD: CPT | Performed by: FAMILY MEDICINE

## 2019-06-06 PROCEDURE — G8427 DOCREV CUR MEDS BY ELIG CLIN: HCPCS | Performed by: FAMILY MEDICINE

## 2019-06-06 ASSESSMENT — ENCOUNTER SYMPTOMS
ABDOMINAL PAIN: 0
APNEA: 0
CHEST TIGHTNESS: 0
BACK PAIN: 0
ABDOMINAL DISTENTION: 0
EYE DISCHARGE: 0

## 2019-06-06 NOTE — PROGRESS NOTES
HPI:  Patient comes in today for   Chief Complaint   Patient presents with    Finger Injury     ER f/u visit. Pt cut her Rt index finger on a razor blade on 5/27/19. Pt was treated with 3 outer sutures. Pt states the finger does hurt a small amount but has no swelling.  Leg Pain     Pt has c/o upper leg pain. Pt has trouble putting any weight on the leg or walking. Denies any injuries. Onset 9 months but has gotten worse over the past 2-3 weeks. Would like an XR. Rates pain 9/10. .      3 Sutures removed from the tip of Right [place]. Dry and neurovascular intact. Review of Systems  Review of Systems   Constitutional: Positive for activity change (decreased). Negative for appetite change and chills. HENT: Negative for congestion and drooling. Eyes: Negative for discharge. Respiratory: Negative for apnea and chest tightness. Gastrointestinal: Negative for abdominal distention and abdominal pain. Genitourinary: Negative for difficulty urinating. Musculoskeletal: Positive for arthralgias (Right knee and right hip.). Negative for back pain. Neurological: Negative for dizziness. Hematological: Negative for adenopathy. Psychiatric/Behavioral: Negative for agitation, behavioral problems, confusion and decreased concentration. PE:  VS:  /70   Pulse 57   Temp 98.3 °F (36.8 °C) (Oral)   Resp 16   Ht 5' 6\" (1.676 m)   Wt 190 lb (86.2 kg)   LMP  (LMP Unknown)   SpO2 93%   Breastfeeding? No   BMI 30.67 kg/m²   Physical Exam   Constitutional: She appears well-developed. HENT:   Head: Normocephalic. Right Ear: External ear normal.   Left Ear: External ear normal.   Eyes: Pupils are equal, round, and reactive to light. Neck: Normal range of motion. No tracheal deviation present. No thyromegaly present. Cardiovascular: Normal rate and regular rhythm. Exam reveals no friction rub. No murmur heard. Pulmonary/Chest: Effort normal. No respiratory distress.    Abdominal: Soft. She exhibits no distension. There is no tenderness. Musculoskeletal: Normal range of motion. Tender along the Right ITB and the pain in the Right Hip joint. Neurological: She is alert. No cranial nerve deficit. Skin: Skin is warm. Capillary refill takes less than 2 seconds. Psychiatric: She has a normal mood and affect. Her behavior is normal.       ASSESSMENT/PLAN:    1. Encounter for removal of sutures    2. Pain of right hip joint  - External Referral To Physical Therapy    3. Iliotibial band syndrome of right side  - External Referral To Physical Therapy                      DOMINIC Lechuga.

## 2019-08-19 DIAGNOSIS — Z12.39 SCREENING FOR BREAST CANCER: Primary | ICD-10-CM

## 2019-08-27 ENCOUNTER — HOSPITAL ENCOUNTER (OUTPATIENT)
Dept: MAMMOGRAPHY | Age: 79
Discharge: HOME | End: 2019-08-29
Payer: MEDICARE

## 2019-08-27 DIAGNOSIS — Z12.39 SCREENING FOR BREAST CANCER: ICD-10-CM

## 2019-08-27 PROCEDURE — 77063 BREAST TOMOSYNTHESIS BI: CPT

## 2019-09-25 ENCOUNTER — OFFICE VISIT (OUTPATIENT)
Dept: FAMILY MEDICINE CLINIC | Age: 79
End: 2019-09-25
Payer: MEDICARE

## 2019-09-25 VITALS
DIASTOLIC BLOOD PRESSURE: 64 MMHG | BODY MASS INDEX: 29.99 KG/M2 | WEIGHT: 186.6 LBS | HEIGHT: 66 IN | TEMPERATURE: 98.1 F | HEART RATE: 64 BPM | RESPIRATION RATE: 16 BRPM | OXYGEN SATURATION: 98 % | SYSTOLIC BLOOD PRESSURE: 124 MMHG

## 2019-09-25 DIAGNOSIS — M89.9 DISORDER OF BONE: ICD-10-CM

## 2019-09-25 DIAGNOSIS — D31.32 NEVUS OF CHOROID OF LEFT EYE: ICD-10-CM

## 2019-09-25 DIAGNOSIS — E78.00 PURE HYPERCHOLESTEROLEMIA: ICD-10-CM

## 2019-09-25 DIAGNOSIS — M76.31 ILIOTIBIAL BAND SYNDROME OF RIGHT SIDE: Primary | ICD-10-CM

## 2019-09-25 DIAGNOSIS — S52.91XS CLOSED FRACTURE OF RIGHT FOREARM, SEQUELA: ICD-10-CM

## 2019-09-25 DIAGNOSIS — Z00.00 ROUTINE GENERAL MEDICAL EXAMINATION AT A HEALTH CARE FACILITY: ICD-10-CM

## 2019-09-25 DIAGNOSIS — Z13.820 SCREENING FOR OSTEOPOROSIS: ICD-10-CM

## 2019-09-25 PROCEDURE — 4040F PNEUMOC VAC/ADMIN/RCVD: CPT | Performed by: FAMILY MEDICINE

## 2019-09-25 PROCEDURE — G0439 PPPS, SUBSEQ VISIT: HCPCS | Performed by: FAMILY MEDICINE

## 2019-09-25 PROCEDURE — 1123F ACP DISCUSS/DSCN MKR DOCD: CPT | Performed by: FAMILY MEDICINE

## 2019-09-25 ASSESSMENT — PATIENT HEALTH QUESTIONNAIRE - PHQ9
SUM OF ALL RESPONSES TO PHQ QUESTIONS 1-9: 0
SUM OF ALL RESPONSES TO PHQ QUESTIONS 1-9: 0

## 2019-09-25 NOTE — PROGRESS NOTES
Medicare Annual Wellness Visit  Name: Macy Hoyos Date: 2019   MRN: <[id number]> Sex: Female   Age: 70 y.o. Ethnicity: Non-/Non    : 1940 Race: Eusebio Gomez is here for Medicare AWV    Screenings for behavioral, psychosocial and functional/safety risks, and cognitive dysfunction are all negative except as indicated below. These results, as well as other patient data from the [address] form, are documented in Flowsheets linked to this Encounter. No Known Allergies  Prior to Visit Medications    Medication Sig Taking? Authorizing Provider   Undecylenic Acid (GORDOCHOM) 25 % SOLN Apply topically Yes Historical Provider, MD   LAVENDER OIL PO Take by mouth Yes Historical Provider, MD   lisinopril-hydrochlorothiazide (PRINZIDE;ZESTORETIC) 10-12.5 MG per tablet Take 1 tablet by mouth daily Yes Ruth Smoker, DO   atorvastatin (LIPITOR) 10 MG tablet Take 1 tablet by mouth daily Yes MARVIN Bey - MANGO   albuterol sulfate  (90 BASE) MCG/ACT inhaler Inhale 2 puffs into the lungs every 6 hours as needed for Wheezing Yes Historical Provider, MD   vitamin B-12 (CYANOCOBALAMIN) 1000 MCG tablet Take 1,000 mcg by mouth once a week 2x/weekly Yes Historical Provider, MD   Cholecalciferol (VITAMIN D3) 400 UNIT/ML LIQD Take 5 drops by mouth daily Yes Historical Provider, MD   COCONUT OIL PO Take by mouth Yes Historical Provider, MD   aspirin 81 MG chewable tablet Take 81 mg by mouth daily  Historical Provider, MD     Past Medical History:   Diagnosis Date    Asthma     Hernia     Hypertension      Past Surgical History:   Procedure Laterality Date    BLADDER SURGERY      DILATION AND CURETTAGE OF UTERUS      FRACTURE SURGERY      right arm    HYSTERECTOMY      KNEE CARTILAGE SURGERY       No family history on file.     CareTeam (Including outside providers/suppliers regularly involved in providing care):   Patient Care Team:  Louis Da Silva Jeaneth Oseguera as PCP - General  Ailyn Crews DO as PCP - [facility]  Edith Tony MD  [id number] Jewel Smith MD as Surgeon (Ophthalmology)    Wt Readings from Last 3 Encounters:   09/25/19 186 lb 9.6 oz (84.6 kg)   06/06/19 190 lb (86.2 kg)   05/27/19 192 lb (87.1 kg)     Vitals:    09/25/19 0803   BP: 124/64   Pulse: 64   Resp: 16   Temp: 98.1 °F (36.7 °C)   TempSrc: Oral   SpO2: 98%   Weight: 186 lb 9.6 oz (84.6 kg)   Height: 5' 6\" (1.676 m)     Body mass index is 30.12 kg/m². Based upon direct observation of the patient, evaluation of cognition reveals recent and remote memory intact. General Appearance: alert and oriented to person, place and time, well developed and well- nourished, in no acute distress  Skin: warm and dry, no rash or erythema  Head: normocephalic and atraumatic  Eyes: pupils equal, round, and reactive to light, extraocular eye movements intact, conjunctivae normal  ENT: tympanic membrane, external ear and ear canal normal bilaterally, nose without deformity, nasal mucosa and turbinates normal without polyps  Neck: supple and non-tender without mass, no thyromegaly or thyroid nodules, no cervical lymphadenopathy  Pulmonary/Chest: clear to auscultation bilaterally- no wheezes, rales or rhonchi, normal air movement, no respiratory distress  Cardiovascular: normal rate, regular rhythm, normal S1 and S2, no murmurs, rubs, clicks, or gallops, distal pulses intact, no carotid bruits  Abdomen: soft, non-tender, non-distended, normal bowel sounds, no masses or organomegaly  Extremities: no cyanosis, clubbing or edema  Musculoskeletal: decreaed range of motion, no joint swelling, Positive hand joint deformity or tenderness  Neurologic: reflexes normal and symmetric, no cranial nerve deficit, gait, coordination and speech normal    Patient's complete Health Risk Assessment and screening values have been reviewed and are found in Flowsheets.  The following problems were reviewed today and where indicated follow up appointments were made and/or referrals ordered. Positive Risk Factor Screenings with Interventions:     Fall Risk:  Timed Up and Go Test > 12 seconds? (Complete if either Fall Risk answers are Yes): no  2 or more falls in past year?: (!) yes  Fall with injury in past year?: no  Fall Risk Interventions:    · none    Health Habits/Nutrition:  Health Habits/Nutrition  Do you exercise for at least 20 minutes 2-3 times per week?: Yes  Have you lost any weight without trying in the past 3 months?: No  Do you eat fewer than 2 meals per day?: No  Have you seen a dentist within the past year?: Yes  Body mass index is 30.12 kg/m².   Health Habits/Nutrition Interventions:  · none    Safety:  Safety  Do you have working smoke detectors?: Yes  Have all throw rugs been removed or fastened?: Yes  Do you have non-slip mats or surfaces in all bathtubs/showers?: (!) No  Do all of your stairways have a railing or banister?: Yes  Are your doorways, halls and stairs free of clutter?: Yes  Do you always fasten your seatbelt when you are in a car?: Yes  Safety Interventions:  · none    Personalized Preventive Plan   Current Health Maintenance Status  Immunization History   Administered Date(s) Administered    Influenza Vaccine, unspecified formulation 08/31/2017    Influenza, High Dose (Fluzone 65 yrs and older) 11/02/2015, 09/19/2016, 09/24/2018    Pneumococcal Conjugate 13-valent ([id number]) 03/04/2016    Pneumococcal Polysaccharide ([id number]) 09/20/2017    Tdap (Boostrix, Adacel) 04/18/2018, 05/27/2019        Health Maintenance   Topic Date Due    Shingles Vaccine (1 of 2) 09/25/1992    DEXA (modify frequency per FRAX score)  09/25/2007    Potassium monitoring  10/05/2018    Creatinine monitoring  10/05/2018    Annual Wellness Visit (AWV)  05/29/2019    Flu vaccine (1) 09/01/2019    Lipid screen  09/24/2019    DTaP/Tdap/Td vaccine (3 - Td) 05/27/2029    Pneumococcal 65+ years Vaccine  Completed     Recommendations for Preventive Services Due: see orders and patient instructions/AVS.  .   Recommended screening schedule for the next 5-10 years is provided to the patient in written form: see Patient Instructions/AVS.

## 2019-09-25 NOTE — PATIENT INSTRUCTIONS
Personalized Preventive Plan for Rosenda Clark - 9/25/2019  Medicare offers a range of preventive health benefits. Some of the tests and screenings are paid in full while other may be subject to a deductible, co-insurance, and/or copay. Some of these benefits include a comprehensive review of your medical history including lifestyle, illnesses that may run in your family, and various assessments and screenings as appropriate. After reviewing your medical record and screening and assessments performed today your provider may have ordered immunizations, labs, imaging, and/or referrals for you. A list of these orders (if applicable) as well as your Preventive Care list are included within your After Visit Summary for your review. Other Preventive Recommendations:    · A preventive eye exam performed by an eye specialist is recommended every 1-2 years to screen for glaucoma; cataracts, macular degeneration, and other eye disorders. · A preventive dental visit is recommended every 6 months. · Try to get at least 150 minutes of exercise per week or 10,000 steps per day on a pedometer . · Order or download the FREE \"Exercise & Physical Activity: Your Everyday Guide\" from [employer]. Call 0-284.684.6302 or search [employer] online. · You need 9613-1837 mg of calcium and 9003-7251 IU of vitamin D per day. It is possible to meet your calcium requirement with diet alone, but a vitamin D supplement is usually necessary to meet this goal.  · When exposed to the sun, use a sunscreen that protects against both UVA and UVB radiation with an SPF of 30 or greater. Reapply every 2 to 3 hours or after sweating, drying off with a towel, or swimming. · Always wear a seat belt when traveling in a car. Always wear a helmet when riding a bicycle or motorcycle.

## 2019-09-27 ENCOUNTER — HOSPITAL ENCOUNTER (OUTPATIENT)
Dept: MAMMOGRAPHY | Age: 79
Discharge: HOME | End: 2019-09-29
Payer: MEDICARE

## 2019-09-27 DIAGNOSIS — M89.9 DISORDER OF BONE: ICD-10-CM

## 2019-09-27 DIAGNOSIS — Z13.820 SCREENING FOR OSTEOPOROSIS: ICD-10-CM

## 2019-09-27 DIAGNOSIS — S52.91XS CLOSED FRACTURE OF RIGHT FOREARM, SEQUELA: ICD-10-CM

## 2019-09-27 PROCEDURE — 77080 DXA BONE DENSITY AXIAL: CPT

## 2019-09-28 ENCOUNTER — HOSPITAL ENCOUNTER (OUTPATIENT)
Age: 79
Discharge: HOME | End: 2019-09-28
Payer: MEDICARE

## 2019-09-28 DIAGNOSIS — E78.00 PURE HYPERCHOLESTEROLEMIA: ICD-10-CM

## 2019-09-28 LAB
ALBUMIN SERPL-MCNC: 4.2 G/DL (ref 3.5–5.2)
ALP BLD-CCNC: 145 U/L (ref 35–104)
ALT SERPL-CCNC: 15 U/L (ref 0–32)
ANION GAP SERPL CALCULATED.3IONS-SCNC: 10 MMOL/L (ref 7–16)
AST SERPL-CCNC: 16 U/L (ref 0–31)
BASOPHILS ABSOLUTE: 0.03 E9/L (ref 0–0.2)
BASOPHILS RELATIVE PERCENT: 0.7 % (ref 0–2)
BILIRUB SERPL-MCNC: 0.5 MG/DL (ref 0–1.2)
BUN BLDV-MCNC: 24 MG/DL (ref 8–23)
CALCIUM SERPL-MCNC: 9.4 MG/DL (ref 8.6–10.2)
CHLORIDE BLD-SCNC: 104 MMOL/L (ref 98–107)
CO2: 29 MMOL/L (ref 22–29)
CREAT SERPL-MCNC: 0.9 MG/DL (ref 0.5–1)
EOSINOPHILS ABSOLUTE: 0.04 E9/L (ref 0.05–0.5)
EOSINOPHILS RELATIVE PERCENT: 0.9 % (ref 0–6)
GFR AFRICAN AMERICAN: >60
GFR NON-AFRICAN AMERICAN: >60 ML/MIN/1.73
GLUCOSE BLD-MCNC: 102 MG/DL (ref 74–99)
HCT VFR BLD CALC: 40 % (ref 34–48)
HEMOGLOBIN: 13.2 G/DL (ref 11.5–15.5)
IMMATURE GRANULOCYTES #: 0.01 E9/L
IMMATURE GRANULOCYTES %: 0.2 % (ref 0–5)
LYMPHOCYTES ABSOLUTE: 1.13 E9/L (ref 1.5–4)
LYMPHOCYTES RELATIVE PERCENT: 24.6 % (ref 20–42)
MCH RBC QN AUTO: 29.8 PG (ref 26–35)
MCHC RBC AUTO-ENTMCNC: 33 % (ref 32–34.5)
MCV RBC AUTO: 90.3 FL (ref 80–99.9)
MONOCYTES ABSOLUTE: 0.33 E9/L (ref 0.1–0.95)
MONOCYTES RELATIVE PERCENT: 7.2 % (ref 2–12)
NEUTROPHILS ABSOLUTE: 3.06 E9/L (ref 1.8–7.3)
NEUTROPHILS RELATIVE PERCENT: 66.4 % (ref 43–80)
PDW BLD-RTO: 12.5 FL (ref 11.5–15)
PLATELET # BLD: 207 E9/L (ref 130–450)
PMV BLD AUTO: 11 FL (ref 7–12)
POTASSIUM SERPL-SCNC: 4.1 MMOL/L (ref 3.5–5)
RBC # BLD: 4.43 E12/L (ref 3.5–5.5)
SODIUM BLD-SCNC: 143 MMOL/L (ref 132–146)
TOTAL PROTEIN: 7.2 G/DL (ref 6.4–8.3)
WBC # BLD: 4.6 E9/L (ref 4.5–11.5)

## 2019-09-28 PROCEDURE — 85025 COMPLETE CBC W/AUTO DIFF WBC: CPT

## 2019-09-28 PROCEDURE — 80053 COMPREHEN METABOLIC PANEL: CPT

## 2019-09-28 PROCEDURE — 36415 COLL VENOUS BLD VENIPUNCTURE: CPT

## 2019-10-07 ENCOUNTER — TELEPHONE (OUTPATIENT)
Dept: FAMILY MEDICINE CLINIC | Age: 79
End: 2019-10-07

## 2019-10-07 DIAGNOSIS — M62.3 PARAPLEGIC IMMOBILITY SYNDROME: ICD-10-CM

## 2019-10-07 DIAGNOSIS — J45.20 MILD INTERMITTENT ASTHMA WITHOUT COMPLICATION: Primary | ICD-10-CM

## 2019-12-30 DIAGNOSIS — E78.00 PURE HYPERCHOLESTEROLEMIA: ICD-10-CM

## 2019-12-30 RX ORDER — ATORVASTATIN CALCIUM 10 MG/1
10 TABLET, FILM COATED ORAL DAILY
Qty: 90 TABLET | Refills: 3 | Status: SHIPPED
Start: 2019-12-30 | End: 2021-02-02 | Stop reason: SDUPTHER

## 2020-03-16 RX ORDER — LISINOPRIL AND HYDROCHLOROTHIAZIDE 12.5; 1 MG/1; MG/1
1 TABLET ORAL DAILY
Qty: 90 TABLET | Refills: 3 | Status: SHIPPED
Start: 2020-03-16 | End: 2021-03-15 | Stop reason: SDUPTHER

## 2020-10-05 ENCOUNTER — OFFICE VISIT (OUTPATIENT)
Dept: FAMILY MEDICINE CLINIC | Age: 80
End: 2020-10-05
Payer: MEDICARE

## 2020-10-05 VITALS
WEIGHT: 199 LBS | BODY MASS INDEX: 31.98 KG/M2 | DIASTOLIC BLOOD PRESSURE: 70 MMHG | SYSTOLIC BLOOD PRESSURE: 128 MMHG | RESPIRATION RATE: 16 BRPM | TEMPERATURE: 96.7 F | HEART RATE: 74 BPM | HEIGHT: 66 IN | OXYGEN SATURATION: 97 %

## 2020-10-05 PROCEDURE — 4040F PNEUMOC VAC/ADMIN/RCVD: CPT | Performed by: FAMILY MEDICINE

## 2020-10-05 PROCEDURE — G0439 PPPS, SUBSEQ VISIT: HCPCS | Performed by: FAMILY MEDICINE

## 2020-10-05 PROCEDURE — 1123F ACP DISCUSS/DSCN MKR DOCD: CPT | Performed by: FAMILY MEDICINE

## 2020-10-05 PROCEDURE — G8484 FLU IMMUNIZE NO ADMIN: HCPCS | Performed by: FAMILY MEDICINE

## 2020-10-05 ASSESSMENT — LIFESTYLE VARIABLES: HOW OFTEN DO YOU HAVE A DRINK CONTAINING ALCOHOL: 0

## 2020-10-05 ASSESSMENT — PATIENT HEALTH QUESTIONNAIRE - PHQ9
1. LITTLE INTEREST OR PLEASURE IN DOING THINGS: 0
SUM OF ALL RESPONSES TO PHQ QUESTIONS 1-9: 0
SUM OF ALL RESPONSES TO PHQ QUESTIONS 1-9: 0
2. FEELING DOWN, DEPRESSED OR HOPELESS: 0
SUM OF ALL RESPONSES TO PHQ9 QUESTIONS 1 & 2: 0

## 2020-10-05 NOTE — PROGRESS NOTES
Medicare Annual Wellness Visit  Name: Cindy Vieira Date: 10/5/2020   MRN: <[id number]> Sex: Female   Age: 68 y.o. Ethnicity: Non-/Non    : 1940 Race: Neomia Heading is here for Medicare AWV    Screenings for behavioral, psychosocial and functional/safety risks, and cognitive dysfunction are all negative except as indicated below. These results, as well as other patient data from the [address] form, are documented in Flowsheets linked to this Encounter. No Known Allergies      Prior to Visit Medications    Medication Sig Taking? Authorizing Provider   lisinopril-hydroCHLOROthiazide (PRINZIDE;ZESTORETIC) 10-12.5 MG per tablet Take 1 tablet by mouth daily Yes [place]ma [place], DO   atorvastatin (LIPITOR) 10 MG tablet Take 1 tablet by mouth daily Yes Burma [place], DO   Undecylenic Acid (GORDOCHOM) 25 % SOLN Apply topically Yes Historical Provider, MD   LAVENDER OIL PO Take by mouth Yes Historical Provider, MD   albuterol sulfate  (90 BASE) MCG/ACT inhaler Inhale 2 puffs into the lungs every 6 hours as needed for Wheezing Yes Historical Provider, MD   vitamin B-12 (CYANOCOBALAMIN) 1000 MCG tablet Take 1,000 mcg by mouth once a week 2x/weekly Yes Historical Provider, MD   aspirin 81 MG chewable tablet Take 81 mg by mouth daily Yes Historical Provider, MD   Cholecalciferol (VITAMIN D3) 400 UNIT/ML LIQD Take 5 drops by mouth daily Yes Historical Provider, MD   COCONUT OIL PO Take by mouth Yes Historical Provider, MD         Past Medical History:   Diagnosis Date    Asthma     Hernia     Hypertension        Past Surgical History:   Procedure Laterality Date    BLADDER SURGERY      DILATION AND CURETTAGE OF UTERUS      FRACTURE SURGERY      right arm    HYSTERECTOMY      KNEE CARTILAGE SURGERY         No family history on file.     CareTeam (Including outside providers/suppliers regularly involved in providing care):   Patient Care problems were reviewed today and where indicated follow up appointments were made and/or referrals ordered. Positive Risk Factor Screenings with Interventions:     Cognitive: Words recalled: 2 Words Recalled  Total Score Interpretation: Positive Mini-Cog  Cognitive Impairment Interventions:  · none    General Health:  General  In general, how would you say your health is?: Very Good  In the past 7 days, have you experienced any of the following? New or Increased Pain, New or Increased Fatigue, Loneliness, Social Isolation, Stress or Anger?: None of These  Do you get the social and emotional support that you need?: Yes  Do you have a Living Will?: (!) No  General Health Risk Interventions:  · none    Health Habits/Nutrition:  Health Habits/Nutrition  Do you exercise for at least 20 minutes 2-3 times per week?: (!) No  Have you lost any weight without trying in the past 3 months?: No  Do you eat fewer than 2 meals per day?: No  Have you seen a dentist within the past year?: Yes  Body mass index is 32.12 kg/m².   Health Habits/Nutrition Interventions:  · none    Hearing/Vision:  No exam data present  Hearing/Vision  Do you or your family notice any trouble with your hearing?: (!) Yes  Do you have difficulty driving, watching TV, or doing any of your daily activities because of your eyesight?: No  Have you had an eye exam within the past year?: Yes  Hearing/Vision Interventions:  · none    Safety:  Safety  Do you have working smoke detectors?: Yes  Have all throw rugs been removed or fastened?: Yes  Do you have non-slip mats or surfaces in all bathtubs/showers?: (!) No  Do all of your stairways have a railing or banister?: Yes  Are your doorways, halls and stairs free of clutter?: Yes  Do you always fasten your seatbelt when you are in a car?: Yes  Safety Interventions:  · none    Personalized Preventive Plan   Current Health Maintenance Status  Immunization History   Administered Date(s) Administered    Influenza Vaccine, unspecified formulation 08/31/2017    Influenza, High Dose (Fluzone 65 yrs and older) 11/02/2015, 09/19/2016, 09/24/2018    Pneumococcal Conjugate 13-valent ([id number]) 03/04/2016    Pneumococcal Polysaccharide ([id number]) 09/20/2017    Tdap (Boostrix, Adacel) 04/18/2018, 05/27/2019        Health Maintenance   Topic Date Due    Shingles Vaccine (1 of 2) 09/25/1992    Annual Wellness Visit (AWV)  05/29/2019    Lipid screen  09/24/2019    Flu vaccine (1) 09/01/2020    Potassium monitoring  09/28/2020    Creatinine monitoring  09/28/2020    DTaP/Tdap/Td vaccine (3 - Td) 05/27/2029    DEXA (modify frequency per FRAX score)  Completed    Pneumococcal 65+ years Vaccine  Completed    Hepatitis A vaccine  Aged Out    Hepatitis B vaccine  Aged Out    Hib vaccine  Aged Out    Meningococcal (ACWY) vaccine  Aged Out     Recommendations for Bliips Due: see orders and patient instructions/AVS.  . Recommended screening schedule for the next 5-10 years is provided to the patient in written form: see Patient Brayan Aldrich was seen today for medicare awv. Diagnoses and all orders for this visit:    Encounter for screening mammogram for malignant neoplasm of breast  -     ANJALI DIGITAL SCREEN W OR WO CAD BILATERAL;  Future    Mild intermittent asthma, unspecified whether complicated

## 2020-10-05 NOTE — PATIENT INSTRUCTIONS
Personalized Preventive Plan for Cesilia Castellon - 10/5/2020  Medicare offers a range of preventive health benefits. Some of the tests and screenings are paid in full while other may be subject to a deductible, co-insurance, and/or copay. Some of these benefits include a comprehensive review of your medical history including lifestyle, illnesses that may run in your family, and various assessments and screenings as appropriate. After reviewing your medical record and screening and assessments performed today your provider may have ordered immunizations, labs, imaging, and/or referrals for you. A list of these orders (if applicable) as well as your Preventive Care list are included within your After Visit Summary for your review. Other Preventive Recommendations:    · A preventive eye exam performed by an eye specialist is recommended every 1-2 years to screen for glaucoma; cataracts, macular degeneration, and other eye disorders. · A preventive dental visit is recommended every 6 months. · Try to get at least 150 minutes of exercise per week or 10,000 steps per day on a pedometer . · Order or download the FREE \"Exercise & Physical Activity: Your Everyday Guide\" from [employer]. Call 3-510.254.2661 or search [employer] online. · You need 7117-2702 mg of calcium and 9578-4253 IU of vitamin D per day. It is possible to meet your calcium requirement with diet alone, but a vitamin D supplement is usually necessary to meet this goal.  · When exposed to the sun, use a sunscreen that protects against both UVA and UVB radiation with an SPF of 30 or greater. Reapply every 2 to 3 hours or after sweating, drying off with a towel, or swimming. · Always wear a seat belt when traveling in a car. Always wear a helmet when riding a bicycle or motorcycle.

## 2020-10-13 ENCOUNTER — NURSE ONLY (OUTPATIENT)
Dept: FAMILY MEDICINE CLINIC | Age: 80
End: 2020-10-13
Payer: MEDICARE

## 2020-10-13 PROCEDURE — G0008 ADMIN INFLUENZA VIRUS VAC: HCPCS | Performed by: NURSE PRACTITIONER

## 2020-10-13 PROCEDURE — 90694 VACC AIIV4 NO PRSRV 0.5ML IM: CPT | Performed by: NURSE PRACTITIONER

## 2020-10-15 ENCOUNTER — HOSPITAL ENCOUNTER (OUTPATIENT)
Dept: MAMMOGRAPHY | Age: 80
Discharge: HOME | End: 2020-10-17
Payer: MEDICARE

## 2020-10-15 PROCEDURE — 77063 BREAST TOMOSYNTHESIS BI: CPT

## 2020-12-14 ENCOUNTER — TELEPHONE (OUTPATIENT)
Dept: ADMINISTRATIVE | Age: 80
End: 2020-12-14

## 2020-12-15 ENCOUNTER — TELEPHONE (OUTPATIENT)
Dept: FAMILY MEDICINE CLINIC | Age: 80
End: 2020-12-15

## 2020-12-15 NOTE — TELEPHONE ENCOUNTER
Pt states she hurt her throat with gargling with salt/vinager water. Has a sore throat now and would like to do the same thing but worried she may hurt her throat again. She is gargling once daily with the salt water apple cider mix. Pt has been tested for covid and came up negative.           Please advise

## 2020-12-28 ENCOUNTER — OFFICE VISIT (OUTPATIENT)
Dept: FAMILY MEDICINE CLINIC | Age: 80
End: 2020-12-28
Payer: MEDICARE

## 2020-12-28 VITALS
DIASTOLIC BLOOD PRESSURE: 78 MMHG | SYSTOLIC BLOOD PRESSURE: 120 MMHG | OXYGEN SATURATION: 98 % | WEIGHT: 202.6 LBS | BODY MASS INDEX: 32.7 KG/M2 | HEART RATE: 78 BPM | RESPIRATION RATE: 16 BRPM | TEMPERATURE: 97.1 F

## 2020-12-28 PROCEDURE — 1036F TOBACCO NON-USER: CPT | Performed by: FAMILY MEDICINE

## 2020-12-28 PROCEDURE — G8427 DOCREV CUR MEDS BY ELIG CLIN: HCPCS | Performed by: FAMILY MEDICINE

## 2020-12-28 PROCEDURE — G8417 CALC BMI ABV UP PARAM F/U: HCPCS | Performed by: FAMILY MEDICINE

## 2020-12-28 PROCEDURE — 1123F ACP DISCUSS/DSCN MKR DOCD: CPT | Performed by: FAMILY MEDICINE

## 2020-12-28 PROCEDURE — 4040F PNEUMOC VAC/ADMIN/RCVD: CPT | Performed by: FAMILY MEDICINE

## 2020-12-28 PROCEDURE — 1090F PRES/ABSN URINE INCON ASSESS: CPT | Performed by: FAMILY MEDICINE

## 2020-12-28 PROCEDURE — 99213 OFFICE O/P EST LOW 20 MIN: CPT | Performed by: FAMILY MEDICINE

## 2020-12-28 PROCEDURE — G8399 PT W/DXA RESULTS DOCUMENT: HCPCS | Performed by: FAMILY MEDICINE

## 2020-12-28 PROCEDURE — G8484 FLU IMMUNIZE NO ADMIN: HCPCS | Performed by: FAMILY MEDICINE

## 2020-12-28 ASSESSMENT — ENCOUNTER SYMPTOMS
VOMITING: 0
NAUSEA: 0
DIARRHEA: 0
ABDOMINAL PAIN: 0
BLOOD IN STOOL: 0
WHEEZING: 0
CONSTIPATION: 0
SHORTNESS OF BREATH: 0
COUGH: 0

## 2020-12-28 NOTE — PROGRESS NOTES
Normal breath sounds. No wheezing. Abdominal:      Palpations: Abdomen is soft. Musculoskeletal:         General: No tenderness. Comments: Can stand and walk and there is minimal pain at the Joint line Right knee. Skin:     Capillary Refill: Capillary refill takes less than 2 seconds. Findings: No erythema or rash. Neurological:      General: No focal deficit present. Mental Status: She is alert and oriented to person, place, and time. Deep Tendon Reflexes: Reflexes normal.      Comments:                Assessment/Plan:  Kiana Camejo was seen today for knee pain and abdominal pain. Diagnoses and all orders for this visit:    Internal derangement of right knee  -     XR KNEE RIGHT (3 VIEWS); Future          Estephania Diss DOMINIC Fontaine.

## 2021-02-02 DIAGNOSIS — E78.00 PURE HYPERCHOLESTEROLEMIA: ICD-10-CM

## 2021-02-02 RX ORDER — ATORVASTATIN CALCIUM 10 MG/1
10 TABLET, FILM COATED ORAL DAILY
Qty: 90 TABLET | Refills: 1 | Status: SHIPPED
Start: 2021-02-02 | End: 2021-09-03 | Stop reason: SDUPTHER

## 2021-03-15 DIAGNOSIS — I10 ESSENTIAL HYPERTENSION: ICD-10-CM

## 2021-03-15 RX ORDER — LISINOPRIL AND HYDROCHLOROTHIAZIDE 12.5; 1 MG/1; MG/1
1 TABLET ORAL DAILY
Qty: 90 TABLET | Refills: 0 | Status: SHIPPED
Start: 2021-03-15 | End: 2021-06-14 | Stop reason: SDUPTHER

## 2021-05-27 ENCOUNTER — OFFICE VISIT (OUTPATIENT)
Dept: FAMILY MEDICINE CLINIC | Age: 81
End: 2021-05-27
Payer: MEDICARE

## 2021-05-27 VITALS
DIASTOLIC BLOOD PRESSURE: 80 MMHG | BODY MASS INDEX: 30.6 KG/M2 | TEMPERATURE: 97.2 F | RESPIRATION RATE: 17 BRPM | OXYGEN SATURATION: 98 % | HEIGHT: 66 IN | SYSTOLIC BLOOD PRESSURE: 128 MMHG | WEIGHT: 190.4 LBS | HEART RATE: 89 BPM

## 2021-05-27 DIAGNOSIS — Z01.818 PRE-OP EVALUATION: ICD-10-CM

## 2021-05-27 DIAGNOSIS — E78.2 MIXED HYPERLIPIDEMIA: ICD-10-CM

## 2021-05-27 DIAGNOSIS — Z01.818 PRE-OP EVALUATION: Primary | ICD-10-CM

## 2021-05-27 DIAGNOSIS — I10 ESSENTIAL HYPERTENSION: ICD-10-CM

## 2021-05-27 DIAGNOSIS — H25.9 AGE-RELATED CATARACT OF BOTH EYES, UNSPECIFIED AGE-RELATED CATARACT TYPE: ICD-10-CM

## 2021-05-27 DIAGNOSIS — D31.32 NEVUS OF CHOROID OF LEFT EYE: ICD-10-CM

## 2021-05-27 PROCEDURE — G8417 CALC BMI ABV UP PARAM F/U: HCPCS | Performed by: FAMILY MEDICINE

## 2021-05-27 PROCEDURE — 99214 OFFICE O/P EST MOD 30 MIN: CPT | Performed by: FAMILY MEDICINE

## 2021-05-27 PROCEDURE — 1123F ACP DISCUSS/DSCN MKR DOCD: CPT | Performed by: FAMILY MEDICINE

## 2021-05-27 PROCEDURE — 1090F PRES/ABSN URINE INCON ASSESS: CPT | Performed by: FAMILY MEDICINE

## 2021-05-27 PROCEDURE — G8427 DOCREV CUR MEDS BY ELIG CLIN: HCPCS | Performed by: FAMILY MEDICINE

## 2021-05-27 PROCEDURE — 93000 ELECTROCARDIOGRAM COMPLETE: CPT | Performed by: FAMILY MEDICINE

## 2021-05-27 PROCEDURE — 1036F TOBACCO NON-USER: CPT | Performed by: FAMILY MEDICINE

## 2021-05-27 PROCEDURE — 4040F PNEUMOC VAC/ADMIN/RCVD: CPT | Performed by: FAMILY MEDICINE

## 2021-05-27 PROCEDURE — G8399 PT W/DXA RESULTS DOCUMENT: HCPCS | Performed by: FAMILY MEDICINE

## 2021-05-27 ASSESSMENT — ENCOUNTER SYMPTOMS
BLOOD IN STOOL: 0
WHEEZING: 0
DIARRHEA: 0
COUGH: 0
SHORTNESS OF BREATH: 0
NAUSEA: 0
CONSTIPATION: 0
VOMITING: 0
ABDOMINAL PAIN: 0

## 2021-05-27 ASSESSMENT — SOCIAL DETERMINANTS OF HEALTH (SDOH): HOW HARD IS IT FOR YOU TO PAY FOR THE VERY BASICS LIKE FOOD, HOUSING, MEDICAL CARE, AND HEATING?: NOT HARD AT ALL

## 2021-05-27 ASSESSMENT — PATIENT HEALTH QUESTIONNAIRE - PHQ9
1. LITTLE INTEREST OR PLEASURE IN DOING THINGS: 0
2. FEELING DOWN, DEPRESSED OR HOPELESS: 0

## 2021-05-27 NOTE — PROGRESS NOTES
Ramses Negrete (:  1940) is a 68 y.o. female,Established patient, here for evaluation of the following chief complaint(s):  Pre-op Exam (surgical clearance; ekg)         ASSESSMENT/PLAN:  1. Pre-op evaluation  -     EKG 12 lead; Future  2. Age-related cataract of both eyes, unspecified age-related cataract type  3. Mixed hyperlipidemia  4. Nevus of choroid of left eye  5. Essential hypertension      No follow-ups on file. Subjective   SUBJECTIVE/OBJECTIVE:  Pre-op Exam (surgical clearance; ekg)      Review of Systems   Constitutional: Negative for chills, diaphoresis and fever. HENT: Negative for ear discharge, ear pain, hearing loss, nosebleeds and tinnitus. Eyes:        She has age related cataract formation   Respiratory: Negative for cough, shortness of breath and wheezing. Cardiovascular: Negative for chest pain. Gastrointestinal: Negative for abdominal pain, blood in stool, constipation, diarrhea, nausea and vomiting. Genitourinary: Negative for dysuria, flank pain and hematuria. Musculoskeletal: Negative for myalgias. Skin: Negative for rash. Neurological: Negative for headaches. Hematological: Does not bruise/bleed easily. Psychiatric/Behavioral: Negative for hallucinations and suicidal ideas. MDM moderate    Objective    /80   Pulse 89   Temp 97.2 °F (36.2 °C) (Temporal)   Resp 17   Ht 5' 6\" (1.676 m)   Wt 190 lb 6.4 oz (86.4 kg)   LMP  (LMP Unknown)   SpO2 98%   BMI 30.73 kg/m²   Physical Exam  Constitutional:       General: She is not in acute distress. Appearance: She is well-developed. Eyes:      General: No scleral icterus. Neck:      Thyroid: No thyromegaly. Vascular: No JVD. Trachea: No tracheal deviation. Cardiovascular:      Heart sounds: No gallop. Pulmonary:      Effort: Pulmonary effort is normal. No respiratory distress. Breath sounds: No wheezing. Abdominal:      Palpations: Abdomen is soft. Musculoskeletal:         General: No tenderness. Skin:     Findings: No erythema. Neurological:      Deep Tendon Reflexes: Reflexes normal.            On this date 5/27/2021 I have spent 15 minutes reviewing previous notes, test results and face to face with the patient discussing the diagnosis and importance of compliance with the treatment plan as well as documenting on the day of the visit. Bruce Mendez is medically cleared for cataract surgery. An electronic signature was used to authenticate this note.     --Sasha Plummer, DO

## 2021-06-14 DIAGNOSIS — I10 ESSENTIAL HYPERTENSION: ICD-10-CM

## 2021-06-14 RX ORDER — LISINOPRIL AND HYDROCHLOROTHIAZIDE 12.5; 1 MG/1; MG/1
1 TABLET ORAL DAILY
Qty: 90 TABLET | Refills: 1 | Status: SHIPPED
Start: 2021-06-14 | End: 2022-01-03 | Stop reason: SDUPTHER

## 2021-09-03 ENCOUNTER — TELEPHONE (OUTPATIENT)
Dept: FAMILY MEDICINE CLINIC | Age: 81
End: 2021-09-03

## 2021-09-03 DIAGNOSIS — E78.00 PURE HYPERCHOLESTEROLEMIA: ICD-10-CM

## 2021-09-03 RX ORDER — ATORVASTATIN CALCIUM 10 MG/1
10 TABLET, FILM COATED ORAL DAILY
Qty: 90 TABLET | Refills: 1 | Status: SHIPPED
Start: 2021-09-03 | End: 2022-04-04 | Stop reason: SDUPTHER

## 2021-09-08 ENCOUNTER — OFFICE VISIT (OUTPATIENT)
Dept: FAMILY MEDICINE CLINIC | Age: 81
End: 2021-09-08
Payer: MEDICARE

## 2021-09-08 VITALS
RESPIRATION RATE: 16 BRPM | SYSTOLIC BLOOD PRESSURE: 128 MMHG | TEMPERATURE: 97 F | WEIGHT: 184.6 LBS | OXYGEN SATURATION: 96 % | DIASTOLIC BLOOD PRESSURE: 80 MMHG | BODY MASS INDEX: 30.75 KG/M2 | HEART RATE: 92 BPM | HEIGHT: 65 IN

## 2021-09-08 DIAGNOSIS — K43.0 IRREDUCIBLE VENTRAL INCISIONAL HERNIA: Primary | ICD-10-CM

## 2021-09-08 PROCEDURE — 1036F TOBACCO NON-USER: CPT | Performed by: FAMILY MEDICINE

## 2021-09-08 PROCEDURE — 99213 OFFICE O/P EST LOW 20 MIN: CPT | Performed by: FAMILY MEDICINE

## 2021-09-08 PROCEDURE — G8427 DOCREV CUR MEDS BY ELIG CLIN: HCPCS | Performed by: FAMILY MEDICINE

## 2021-09-08 PROCEDURE — G8399 PT W/DXA RESULTS DOCUMENT: HCPCS | Performed by: FAMILY MEDICINE

## 2021-09-08 PROCEDURE — 1090F PRES/ABSN URINE INCON ASSESS: CPT | Performed by: FAMILY MEDICINE

## 2021-09-08 PROCEDURE — 4040F PNEUMOC VAC/ADMIN/RCVD: CPT | Performed by: FAMILY MEDICINE

## 2021-09-08 PROCEDURE — G8417 CALC BMI ABV UP PARAM F/U: HCPCS | Performed by: FAMILY MEDICINE

## 2021-09-08 PROCEDURE — 1123F ACP DISCUSS/DSCN MKR DOCD: CPT | Performed by: FAMILY MEDICINE

## 2021-09-08 ASSESSMENT — ENCOUNTER SYMPTOMS
COUGH: 0
BLOOD IN STOOL: 0
DIARRHEA: 0
NAUSEA: 0
WHEEZING: 0
VOMITING: 0
ABDOMINAL PAIN: 0
CONSTIPATION: 0
SHORTNESS OF BREATH: 0

## 2021-09-08 NOTE — PROGRESS NOTES
Cami Villa (:  1940) is a 68 y.o. female,Established patient, here for evaluation of the following chief complaint(s):  Hernia (right adomen )         ASSESSMENT/PLAN:  1. Irreducible ventral incisional hernia  -  No surgical intervention at this time, watchful waiting. No follow-ups on file. Subjective   SUBJECTIVE/OBJECTIVE:  Hernia:  Nontender. No hematochezia, melena. Review of Systems   Constitutional: Negative for chills, diaphoresis and fever. HENT: Negative for ear discharge, ear pain, hearing loss, nosebleeds and tinnitus. Respiratory: Negative for cough, shortness of breath and wheezing. Cardiovascular: Negative for chest pain. Gastrointestinal: Negative for abdominal pain, blood in stool, constipation, diarrhea, nausea and vomiting. Genitourinary: Negative for dysuria, flank pain and hematuria. Musculoskeletal: Negative for myalgias. Skin: Negative for rash. Neurological: Negative for headaches. Hematological: Does not bruise/bleed easily. Psychiatric/Behavioral: Negative for hallucinations and suicidal ideas. Objective   /80   Pulse 92   Temp 97 °F (36.1 °C)   Resp 16   Ht 5' 5\" (1.651 m)   Wt 184 lb 9.6 oz (83.7 kg)   LMP  (LMP Unknown)   SpO2 96%   Breastfeeding No   BMI 30.72 kg/m²   Physical Exam  Constitutional:       General: She is not in acute distress. Appearance: Normal appearance. HENT:      Head: Normocephalic and atraumatic. Right Ear: External ear normal.      Left Ear: External ear normal.      Ears:      Comments: Hearing grossly intact     Nose: Nose normal.      Mouth/Throat:      Mouth: Mucous membranes are moist.   Eyes:      Extraocular Movements: Extraocular movements intact. Pupils: Pupils are equal, round, and reactive to light. Cardiovascular:      Rate and Rhythm: Normal rate and regular rhythm. Pulses: Normal pulses. Heart sounds: Normal heart sounds.    Pulmonary: Effort: Pulmonary effort is normal.      Breath sounds: Normal breath sounds. Abdominal:      Comments: Rounded, normoactive sounds, there is a palpable/nontender hernia right ventral incisional hernia about 2.5cm. Nontender. Neurological:      Mental Status: She is alert. On this date 9/8/2021 I have spent 21 minutes reviewing previous notes, test results and face to face with the patient discussing the diagnosis and importance of compliance with the treatment plan as well as documenting on the day of the visit. An electronic signature was used to authenticate this note.     --Kaci Sanford, DO

## 2021-10-01 ENCOUNTER — NURSE ONLY (OUTPATIENT)
Dept: FAMILY MEDICINE CLINIC | Age: 81
End: 2021-10-01
Payer: MEDICARE

## 2021-10-01 DIAGNOSIS — Z23 FLU VACCINE NEED: Primary | ICD-10-CM

## 2021-10-01 PROCEDURE — 90694 VACC AIIV4 NO PRSRV 0.5ML IM: CPT | Performed by: NURSE PRACTITIONER

## 2021-10-01 PROCEDURE — G0008 ADMIN INFLUENZA VIRUS VAC: HCPCS | Performed by: NURSE PRACTITIONER

## 2021-10-07 ENCOUNTER — OFFICE VISIT (OUTPATIENT)
Dept: FAMILY MEDICINE CLINIC | Age: 81
End: 2021-10-07
Payer: MEDICARE

## 2021-10-07 VITALS
RESPIRATION RATE: 20 BRPM | HEIGHT: 65 IN | WEIGHT: 185.6 LBS | DIASTOLIC BLOOD PRESSURE: 62 MMHG | TEMPERATURE: 96.4 F | BODY MASS INDEX: 30.92 KG/M2 | OXYGEN SATURATION: 98 % | HEART RATE: 70 BPM | SYSTOLIC BLOOD PRESSURE: 128 MMHG

## 2021-10-07 DIAGNOSIS — Z11.59 ENCOUNTER FOR HEPATITIS C SCREENING TEST FOR LOW RISK PATIENT: Primary | ICD-10-CM

## 2021-10-07 DIAGNOSIS — E78.2 MIXED HYPERLIPIDEMIA: ICD-10-CM

## 2021-10-07 DIAGNOSIS — Z00.00 ROUTINE GENERAL MEDICAL EXAMINATION AT A HEALTH CARE FACILITY: ICD-10-CM

## 2021-10-07 PROCEDURE — G0439 PPPS, SUBSEQ VISIT: HCPCS | Performed by: FAMILY MEDICINE

## 2021-10-07 PROCEDURE — 1123F ACP DISCUSS/DSCN MKR DOCD: CPT | Performed by: FAMILY MEDICINE

## 2021-10-07 PROCEDURE — G8484 FLU IMMUNIZE NO ADMIN: HCPCS | Performed by: FAMILY MEDICINE

## 2021-10-07 PROCEDURE — 4040F PNEUMOC VAC/ADMIN/RCVD: CPT | Performed by: FAMILY MEDICINE

## 2021-10-07 ASSESSMENT — PATIENT HEALTH QUESTIONNAIRE - PHQ9
SUM OF ALL RESPONSES TO PHQ QUESTIONS 1-9: 0
2. FEELING DOWN, DEPRESSED OR HOPELESS: 0
SUM OF ALL RESPONSES TO PHQ QUESTIONS 1-9: 0
1. LITTLE INTEREST OR PLEASURE IN DOING THINGS: 0
SUM OF ALL RESPONSES TO PHQ9 QUESTIONS 1 & 2: 0
SUM OF ALL RESPONSES TO PHQ QUESTIONS 1-9: 0

## 2021-10-07 ASSESSMENT — LIFESTYLE VARIABLES
AUDIT-C TOTAL SCORE: INCOMPLETE
HOW OFTEN DO YOU HAVE A DRINK CONTAINING ALCOHOL: NEVER
AUDIT TOTAL SCORE: INCOMPLETE
HOW OFTEN DO YOU HAVE A DRINK CONTAINING ALCOHOL: 0

## 2021-10-07 NOTE — PROGRESS NOTES
Medicare Annual Wellness Visit  Name: Benoit Garcia Date: 10/7/2021   MRN: <[id number]> Sex: Female   Age: 80 y.o. Ethnicity: Non- / Non    : 1940 Race: White (non-)      Divya Correa is here for Medicare AWV  /62   Pulse 70   Temp 96.4 °F (35.8 °C) (Temporal)   Resp 20   Ht 5' 5\" (1.651 m)   Wt 185 lb 9.6 oz (84.2 kg)   LMP  (LMP Unknown)   SpO2 98%   BMI 30.89 kg/m²     Screenings for behavioral, psychosocial and functional/safety risks, and cognitive dysfunction are all negative except as indicated below. These results, as well as other patient data from the [address] form, are documented in Flowsheets linked to this Encounter. No Known Allergies    Prior to Visit Medications    Medication Sig Taking?  Authorizing Provider   atorvastatin (LIPITOR) 10 MG tablet Take 1 tablet by mouth daily Yes [place] Cast, APRN - CNP   lisinopril-hydroCHLOROthiazide (PRINZIDE;ZESTORETIC) 10-12.5 MG per tablet Take 1 tablet by mouth daily Yes Doreen Sen DO   Undecylenic Acid (GORDOCHOM) 25 % SOLN Apply topically Yes Historical Provider, MD   LAVENDER OIL PO Take by mouth Yes Historical Provider, MD   albuterol sulfate  (90 BASE) MCG/ACT inhaler Inhale 2 puffs into the lungs every 6 hours as needed for Wheezing Yes Historical Provider, MD   vitamin B-12 (CYANOCOBALAMIN) 1000 MCG tablet Take 1,000 mcg by mouth once a week 2x/weekly Yes Historical Provider, MD   aspirin 81 MG chewable tablet Take 81 mg by mouth daily Yes Historical Provider, MD   Cholecalciferol (VITAMIN D3) 400 UNIT/ML LIQD Take 5 drops by mouth daily Yes Historical Provider, MD   COCONUT OIL PO Take by mouth Yes Historical Provider, MD       Past Medical History:   Diagnosis Date    Asthma     Hernia     Hypertension        Past Surgical History:   Procedure Laterality Date    BLADDER SURGERY      DILATION AND CURETTAGE OF UTERUS      FRACTURE SURGERY      right arm  HYSTERECTOMY      KNEE CARTILAGE SURGERY         No family history on file. CareTeam (Including outside providers/suppliers regularly involved in providing care):   Patient Care Team:  Rufina Lechuga DO as PCP - General  Rufina Lechuga DO as PCP - [facility] Empaneled Provider  Shikha Sanabria MD  [id number] Jewel Smith MD as Surgeon (Ophthalmology)    Wt Readings from Last 3 Encounters:   10/07/21 185 lb 9.6 oz (84.2 kg)   09/08/21 184 lb 9.6 oz (83.7 kg)   05/27/21 190 lb 6.4 oz (86.4 kg)     Vitals:    10/07/21 1021   BP: 128/62   Pulse: 70   Resp: 20   Temp: 96.4 °F (35.8 °C)   TempSrc: Temporal   SpO2: 98%   Weight: 185 lb 9.6 oz (84.2 kg)   Height: 5' 5\" (1.651 m)     Body mass index is 30.89 kg/m². Based upon direct observation of the patient, evaluation of cognition reveals recent and remote memory intact.     General Appearance: alert and oriented to person, place and time, well developed and well- nourished, in no acute distress  Skin: warm and dry, no rash or erythema  Head: normocephalic and atraumatic  Eyes: pupils equal, round, and reactive to light, extraocular eye movements intact, conjunctivae normal  ENT: tympanic membrane, external ear and ear canal normal bilaterally, nose without deformity, nasal mucosa and turbinates normal without polyps  Neck: supple and non-tender without mass, no thyromegaly or thyroid nodules, no cervical lymphadenopathy  Pulmonary/Chest: clear to auscultation bilaterally- no wheezes, rales or rhonchi, normal air movement, no respiratory distress  Cardiovascular: normal rate, regular rhythm, normal S1 and S2, no murmurs, rubs, clicks, or gallops, distal pulses intact, no carotid bruits  Abdomen: soft, non-tender, non-distended, normal bowel sounds, no masses or organomegaly  Extremities: no cyanosis, clubbing or edema  Musculoskeletal: normal range of motion, no joint swelling, deformity or tenderness  Neurologic: reflexes normal and symmetric, no cranial nerve deficit, gait, coordination and speech normal  none    Patient's complete Health Risk Assessment and screening values have been reviewed and are found in Flowsheets. The following problems were reviewed today and where indicated follow up appointments were made and/or referrals ordered. Positive Risk Factor Screenings with Interventions:          General Health and ACP:  General  In general, how would you say your health is?: Excellent  In the past 7 days, have you experienced any of the following?  New or Increased Pain, New or Increased Fatigue, Loneliness, Social Isolation, Stress or Anger?: None of These  Do you get the social and emotional support that you need?: Yes  Do you have a Living Will?: Yes  Advance Directives     Power of  Living Will ACP-Advance Directive ACP-Power of     Not on File Not on File Not on File Not on File      General Health Risk Interventions:  · none    Health Habits/Nutrition:  Health Habits/Nutrition  Do you exercise for at least 20 minutes 2-3 times per week?: Yes  Have you lost any weight without trying in the past 3 months?: No  Do you eat only one meal per day?: No  Have you seen the dentist within the past year?: (!) No  Body mass index: (!) 30.88  Health Habits/Nutrition Interventions:  · none       Personalized Preventive Plan   Current Health Maintenance Status  Immunization History   Administered Date(s) Administered    COVID-19, Galdamez Peter, PF, 30mcg/0.3mL 01/26/2021, 02/16/2021    Influenza A (T7Q1-57) Vaccine PF IM 12/03/2009    Influenza Vaccine, unspecified formulation 09/19/2016, 08/31/2017    Influenza, High Dose (Fluzone 65 yrs and older) 11/02/2015, 09/19/2016, 09/24/2018    Influenza, Quadv, adjuvanted, 65 yrs +, IM, PF (Fluad) 10/13/2020, 10/01/2021    Pneumococcal Conjugate 13-valent (Csblehs15) 03/04/2016    Pneumococcal Polysaccharide ([id number]) 09/20/2017    Tdap (Boostrix, Adacel) 04/18/2018, 05/27/2019 Health Maintenance   Topic Date Due    Hepatitis C screen  Never done    Shingles Vaccine (1 of 2) Never done    Lipid screen  09/24/2019    Potassium monitoring  09/28/2020    Creatinine monitoring  09/28/2020    Annual Wellness Visit (AWV)  10/06/2021    DTaP/Tdap/Td vaccine (3 - Td or Tdap) 05/27/2029    DEXA (modify frequency per FRAX score)  Completed    Flu vaccine  Completed    Pneumococcal 65+ years Vaccine  Completed    COVID-19 Vaccine  Completed    Hepatitis A vaccine  Aged Out    Hepatitis B vaccine  Aged Out    Hib vaccine  Aged Out    Meningococcal (ACWY) vaccine  Aged Out     Recommendations for e27 Due: see orders and patient instructions/AVS.  . Recommended screening schedule for the next 5-10 years is provided to the patient in written form: see Patient Instructions/AVS.    There are no diagnoses linked to this encounter. Advance Care Planning  People with COVID-19 may have no symptoms, mild symptoms, such as fever, cough, and shortness of breath or they may have more severe illness, developing severe and fatal pneumonia. As a result, Advance Care Planning with attention to naming a health care decision maker (someone you trust to make healthcare decisions for you if you could not speak for yourself) and sharing other health care preferences is important BEFORE a possible health crisis. Please contact your Primary Care Provider to discuss Advance Care Planning.     Preventing the Spread of Coronavirus Disease 2019 in Homes and Residential Communities  For the most recent information go to [employer]    Prevention steps for People with confirmed or suspected COVID-19 (including persons under investigation) who do not need to be hospitalized  and   People with confirmed COVID-19 who were hospitalized and determined to be medically stable to go home    Your healthcare provider and public health staff will evaluate whether you can be cared for at home. If it is determined that you do not need to be hospitalized and can be isolated at home, you will be monitored by staff from your local or state health department. You should follow the prevention steps below until a healthcare provider or local or state health department says you can return to your normal activities. Stay home except to get medical care  People who are mildly ill with COVID-19 are able to isolate at home during their illness. You should restrict activities outside your home, except for getting medical care. Do not go to work, school, or public areas. Avoid using public transportation, ride-sharing, or taxis. Separate yourself from other people and animals in your home  People: As much as possible, you should stay in a specific room and away from other people in your home. Also, you should use a separate bathroom, if available. Animals: You should restrict contact with pets and other animals while you are sick with COVID-19, just like you would around other people. Although there have not been reports of pets or other animals becoming sick with COVID-19, it is still recommended that people sick with COVID-19 limit contact with animals until more information is known about the virus. When possible, have another member of your household care for your animals while you are sick. If you are sick with COVID-19, avoid contact with your pet, including petting, snuggling, being kissed or licked, and sharing food. If you must care for your pet or be around animals while you are sick, wash your hands before and after you interact with pets and wear a facemask. Call ahead before visiting your doctor  If you have a medical appointment, call the healthcare provider and tell them that you have or may have COVID-19. This will help the healthcare providers office take steps to keep other people from getting infected or exposed.   Wear a facemask  You should wear a facemask when you are around other people (e.g., sharing a room or vehicle) or pets and before you enter a healthcare providers office. If you are not able to wear a facemask (for example, because it causes trouble breathing), then people who live with you should not stay in the same room with you, or they should wear a facemask if they enter your room. Cover your coughs and sneezes  Cover your mouth and nose with a tissue when you cough or sneeze. Throw used tissues in a lined trash can. Immediately wash your hands with soap and water for at least 20 seconds or, if soap and water are not available, clean your hands with an alcohol-based hand  that contains at least 60% alcohol. Clean your hands often  Wash your hands often with soap and water for at least 20 seconds, especially after blowing your nose, coughing, or sneezing; going to the bathroom; and before eating or preparing food. If soap and water are not readily available, use an alcohol-based hand  with at least 60% alcohol, covering all surfaces of your hands and rubbing them together until they feel dry. Soap and water are the best option if hands are visibly dirty. Avoid touching your eyes, nose, and mouth with unwashed hands. Avoid sharing personal household items  You should not share dishes, drinking glasses, cups, eating utensils, towels, or bedding with other people or pets in your home. After using these items, they should be washed thoroughly with soap and water. Clean all high-touch surfaces everyday  High touch surfaces include counters, tabletops, doorknobs, bathroom fixtures, toilets, phones, keyboards, tablets, and bedside tables. Also, clean any surfaces that may have blood, stool, or body fluids on them. Use a household cleaning spray or wipe, according to the label instructions.  Labels contain instructions for safe and effective use of the cleaning product including precautions you should take when applying the product, such as wearing gloves and making sure you have good ventilation during use of the product. Monitor your symptoms  Seek prompt medical attention if your illness is worsening (e.g., difficulty breathing). Before seeking care, call your healthcare provider and tell them that you have, or are being evaluated for, COVID-19. Put on a facemask before you enter the facility. These steps will help the healthcare providers office to keep other people in the office or waiting room from getting infected or exposed. Ask your healthcare provider to call the local or state health department. Persons who are placed under active monitoring or facilitated self-monitoring should follow instructions provided by their local health department or occupational health professionals, as appropriate. When working with your local health department check their available hours. If you have a medical emergency and need to call 911, notify the dispatch personnel that you have, or are being evaluated for COVID-19. If possible, put on a facemask before emergency medical services arrive. Discontinuing home isolation  Patients with confirmed COVID-19 should remain under home isolation precautions until the risk of secondary transmission to others is thought to be low. The decision to discontinue home isolation precautions should be made on a case-by-case basis, in consultation with healthcare providers and state and local health departments.

## 2021-10-07 NOTE — PATIENT INSTRUCTIONS
Personalized Preventive Plan for Sandrine Ross - 10/7/2021  Medicare offers a range of preventive health benefits. Some of the tests and screenings are paid in full while other may be subject to a deductible, co-insurance, and/or copay. Some of these benefits include a comprehensive review of your medical history including lifestyle, illnesses that may run in your family, and various assessments and screenings as appropriate. After reviewing your medical record and screening and assessments performed today your provider may have ordered immunizations, labs, imaging, and/or referrals for you. A list of these orders (if applicable) as well as your Preventive Care list are included within your After Visit Summary for your review. Other Preventive Recommendations:    · A preventive eye exam performed by an eye specialist is recommended every 1-2 years to screen for glaucoma; cataracts, macular degeneration, and other eye disorders. · A preventive dental visit is recommended every 6 months. · Try to get at least 150 minutes of exercise per week or 10,000 steps per day on a pedometer . · Order or download the FREE \"Exercise & Physical Activity: Your Everyday Guide\" from [employer]. Call 0-448.233.7064 or search [employer] online. · You need 4302-8174 mg of calcium and 9428-5094 IU of vitamin D per day. It is possible to meet your calcium requirement with diet alone, but a vitamin D supplement is usually necessary to meet this goal.  · When exposed to the sun, use a sunscreen that protects against both UVA and UVB radiation with an SPF of 30 or greater. Reapply every 2 to 3 hours or after sweating, drying off with a towel, or swimming. · Always wear a seat belt when traveling in a car. Always wear a helmet when riding a bicycle or motorcycle.

## 2021-10-13 ENCOUNTER — TELEPHONE (OUTPATIENT)
Dept: FAMILY MEDICINE CLINIC | Age: 81
End: 2021-10-13

## 2021-10-13 NOTE — TELEPHONE ENCOUNTER
Patient would like to know if it is okay to continue taking aspirin daily. She has seen on TV that it is not safe for people over 65 to take it daily.

## 2021-11-20 ENCOUNTER — HOSPITAL ENCOUNTER (EMERGENCY)
Age: 81
Discharge: HOME | End: 2021-11-20
Payer: MEDICARE

## 2021-11-20 ENCOUNTER — APPOINTMENT (OUTPATIENT)
Dept: CT IMAGING | Age: 81
End: 2021-11-20
Payer: MEDICARE

## 2021-11-20 VITALS
SYSTOLIC BLOOD PRESSURE: 139 MMHG | HEART RATE: 94 BPM | HEIGHT: 65 IN | DIASTOLIC BLOOD PRESSURE: 64 MMHG | RESPIRATION RATE: 18 BRPM | TEMPERATURE: 97.1 F | BODY MASS INDEX: 30.32 KG/M2 | WEIGHT: 182 LBS | OXYGEN SATURATION: 97 %

## 2021-11-20 DIAGNOSIS — S09.90XA INJURY OF HEAD, INITIAL ENCOUNTER: Primary | ICD-10-CM

## 2021-11-20 PROCEDURE — 70450 CT HEAD/BRAIN W/O DYE: CPT

## 2021-11-20 PROCEDURE — 72125 CT NECK SPINE W/O DYE: CPT

## 2021-11-20 PROCEDURE — 99283 EMERGENCY DEPT VISIT LOW MDM: CPT

## 2021-11-20 PROCEDURE — 12001 RPR S/N/AX/GEN/TRNK 2.5CM/<: CPT

## 2021-11-20 PROCEDURE — 6370000000 HC RX 637 (ALT 250 FOR IP): Performed by: NURSE PRACTITIONER

## 2021-11-20 RX ADMIN — Medication: at 12:20

## 2021-11-20 ASSESSMENT — PAIN DESCRIPTION - PAIN TYPE: TYPE: ACUTE PAIN

## 2021-11-20 ASSESSMENT — PAIN DESCRIPTION - ORIENTATION: ORIENTATION: RIGHT

## 2021-11-20 ASSESSMENT — PAIN DESCRIPTION - LOCATION: LOCATION: HEAD

## 2021-11-20 ASSESSMENT — PAIN DESCRIPTION - FREQUENCY: FREQUENCY: INTERMITTENT

## 2021-11-20 NOTE — ED PROVIDER NOTES
[facility]    Department of Emergency Medicine   ED  Provider Note  Admit Date/RoomTime: 11/20/2021 10:49 AM  ED Room: 24/24 11/20/21  11:04 AM EST    History of Present Illness:   Pratik Rea is a 80 y.o. female presenting to the ED following a mechanical fall which occurred just prior to arrival.  Patient states that she was attempting to move a bin out from under her bed, when she noticed a small object on the floor which looked like a pill, and she bent down to pick it up while leaning on the bed, her hand slipped. She fell into the night stand and hit her head on the stand. She complains of mild pain at the site. The complaint has been persistent, mild in severity, and worsened by nothing. She denies any loss of consciousness. She does not taken blood thinner medication. She does take 81 mg aspirin daily. Bleeding is controlled presently. She denies any other pain or injury from this fall. She has been ambulatory since the fall. Denies hip pain, leg pain, arm pain, trunk pain, abdominal pain. Denies any amnesia, confusion, vision changes, blurred vision, double vision. She has not taken any medications prior to arrival. She denies any recent head injuries. She is alert and oriented, in no acute distress. She declines needing anything for pain control at this time. Review of Systems:   A complete review of systems was performed and pertinent positives and negatives are stated within HPI, all other systems reviewed and are negative.          --------------------------------------------- PAST HISTORY ---------------------------------------------  Past Medical History:  has a past medical history of Asthma, Hernia, and Hypertension. Past Surgical History:  has a past surgical history that includes Hysterectomy; Knee cartilage surgery; Bladder surgery; Dilation and curettage of uterus; and fracture surgery. Social History:  reports that she quit smoking about 41 years ago.  Her smoking use included cigarettes. She has a 8.00 pack-year smoking history. She has never used smokeless tobacco. She reports that she does not drink alcohol and does not use drugs. Family History: family history is not on file. Unless otherwise noted, family history is non contributory    The patients home medications have been reviewed. Allergies: Patient has no known allergies. -------------------------------------------------- RESULTS -------------------------------------------------  All laboratory and radiology results have been personally reviewed by myself   LABS:  No results found for this visit on 11/20/21. RADIOLOGY:  Interpreted by Radiologist.  CT Head WO Contrast   Final Result   1. There is no acute intracranial abnormality. Specifically, there is no   intracranial hemorrhage. 2. Atrophy and periventricular leukomalacia,         CT Cervical Spine WO Contrast   Final Result   1. There is no acute compression fracture or subluxation of the cervical   spine. 2. Advanced multilevel degenerative disc and degenerative joint disease.             ------------------------- NURSING NOTES AND VITALS REVIEWED ---------------------------   The nursing notes within the ED encounter and vital signs as below have been reviewed. /64   Pulse 94   Temp 97.1 °F (36.2 °C) (Tympanic)   Resp 18   Ht 5' 5\" (1.651 m)   Wt 182 lb (82.6 kg)   LMP  (LMP Unknown)   SpO2 97%   BMI 30.29 kg/m²   Oxygen Saturation Interpretation: Normal      ---------------------------------------------------PHYSICAL EXAM--------------------------------------    Constitutional/General: Alert and oriented x3, well appearing, non toxic in NAD, pleasant, calm, cooperative  Head: Normocephalic. Small scalp puncture 0.2cm to the right lateral scalp, no active bleeding. Eyes: PERRL, EOMI, conjunctiva normal, sclera non icteric, no eye drainage, no nystagmus  Ears: TM normal, grey. No bulging. No hemotympanum.  No melvin sign.  Mouth: Oropharynx clear, without erythema, handling secretions, no trismus, no asymmetry of the posterior oropharynx or uvular edema. No tongue/lip swelling. Neck: Supple, full ROM, non tender to palpation in the midline, no stridor, no crepitus, no meningeal signs. No lymphadenopathy. Respiratory: Lungs clear to auscultation bilaterally, no wheezes, rales, or rhonchi. Not in respiratory distress. Respirations easy and unlabored. Cardiovascular:  S1S2. Regular rate. Regular rhythm. No murmurs, gallops, or rubs. 2+ distal pulses  Chest: No chest wall tenderness  GI:  Abdomen Soft, Non tender, Non distended. +BS x 4 quadrants. No organomegaly, no palpable masses,  No rebound, guarding, or rigidity. Musculoskeletal: Moves all extremities x 4. Warm and well perfused, no clubbing, cyanosis, or edema. Capillary refill <3 seconds  Integument: skin warm and dry. No rashes. Laceration as noted above. Lymphatic: no lymphadenopathy noted  Neurologic: GCS 15, no focal deficits, symmetric strength 5/5 in the upper and lower extremities bilaterally  Psychiatric: Normal Affect      ------------------------------ ED COURSE/MEDICAL DECISION MAKING----------------------  Medications   topical skin adhesive stick ( Topical Given 11/20/21 1220)       LACERATION REPAIR  PROCEDURE NOTE:   Performed By: myself. Laceration #: 1. Location: right lateral scalp  Length: 0.2 cm. The wound area was irrigated with sterile saline, cleansend with shur-clens and draped in a sterile fashion. The wound area was anesthetized with not required. WOUND COMPLEXITY:    Debridement: None. Undermining: None. Wound Margins Revised: None. Flaps Aligned: n/a  The wound was explored with the following results No foreign bodies found, No tendon laceration seen, no foreign body or tendon injury seen. The wound was closed with Dermabond.   Dressing:  none        Medical Decision Making:    Patient is a 80year old female presenting to the ED today following a mechanical fall and head injury. Physical examination with a small puncture wound to the right lateral scalp region. No active bleeding. Physical examination wnl otherwise. CT head/neck obtained which are unremarkable. Puncture site cleaned and dermabond applied. Procedure as listed above. Patient educated on warning signs for which she should return to the ED for. Patient verbalizes understanding and she is comfortable and agreeable with that plan. Counseling: The emergency provider has spoken with the patient and discussed todays results, in addition to providing specific details for the plan of care and counseling regarding the diagnosis and prognosis. Questions are answered at this time and they are agreeable with the plan.      --------------------------------- IMPRESSION AND DISPOSITION ---------------------------------    IMPRESSION  1.  Injury of head, initial encounter        DISPOSITION  Disposition: Discharge to home  Patient condition is stable             MARVIN Red CNP  11/20/21 0322

## 2021-11-22 ENCOUNTER — TELEPHONE (OUTPATIENT)
Dept: FAMILY MEDICINE CLINIC | Age: 81
End: 2021-11-22

## 2021-11-24 ENCOUNTER — OFFICE VISIT (OUTPATIENT)
Dept: FAMILY MEDICINE CLINIC | Age: 81
End: 2021-11-24
Payer: MEDICARE

## 2021-11-24 VITALS
DIASTOLIC BLOOD PRESSURE: 70 MMHG | TEMPERATURE: 97.9 F | HEIGHT: 65 IN | WEIGHT: 182 LBS | SYSTOLIC BLOOD PRESSURE: 130 MMHG | HEART RATE: 85 BPM | OXYGEN SATURATION: 97 % | RESPIRATION RATE: 18 BRPM | BODY MASS INDEX: 30.32 KG/M2

## 2021-11-24 DIAGNOSIS — S01.01XS LACERATION OF SCALP, SEQUELA: ICD-10-CM

## 2021-11-24 DIAGNOSIS — S06.0X0S CONCUSSION WITHOUT LOSS OF CONSCIOUSNESS, SEQUELA (HCC): ICD-10-CM

## 2021-11-24 DIAGNOSIS — S09.90XS INJURY OF HEAD, SEQUELA: Primary | ICD-10-CM

## 2021-11-24 PROCEDURE — G8399 PT W/DXA RESULTS DOCUMENT: HCPCS | Performed by: FAMILY MEDICINE

## 2021-11-24 PROCEDURE — G8427 DOCREV CUR MEDS BY ELIG CLIN: HCPCS | Performed by: FAMILY MEDICINE

## 2021-11-24 PROCEDURE — 4040F PNEUMOC VAC/ADMIN/RCVD: CPT | Performed by: FAMILY MEDICINE

## 2021-11-24 PROCEDURE — 1036F TOBACCO NON-USER: CPT | Performed by: FAMILY MEDICINE

## 2021-11-24 PROCEDURE — 1123F ACP DISCUSS/DSCN MKR DOCD: CPT | Performed by: FAMILY MEDICINE

## 2021-11-24 PROCEDURE — G8484 FLU IMMUNIZE NO ADMIN: HCPCS | Performed by: FAMILY MEDICINE

## 2021-11-24 PROCEDURE — G8417 CALC BMI ABV UP PARAM F/U: HCPCS | Performed by: FAMILY MEDICINE

## 2021-11-24 PROCEDURE — 99213 OFFICE O/P EST LOW 20 MIN: CPT | Performed by: FAMILY MEDICINE

## 2021-11-24 PROCEDURE — 1090F PRES/ABSN URINE INCON ASSESS: CPT | Performed by: FAMILY MEDICINE

## 2021-11-24 ASSESSMENT — ENCOUNTER SYMPTOMS
WHEEZING: 0
SHORTNESS OF BREATH: 0
VOMITING: 0
COUGH: 0
ABDOMINAL PAIN: 0
BLOOD IN STOOL: 0
CONSTIPATION: 0
NAUSEA: 0
DIARRHEA: 0

## 2021-11-24 NOTE — PROGRESS NOTES
Jodie Johnson (:  1940) is a 80 y.o. female,Established patient, here for evaluation of the following chief complaint(s):  Fall (fell Saturday hit head )         ASSESSMENT/PLAN:  1. Injury of head, sequela  2. Concussion without loss of consciousness, sequela ([facility] [address]  3. Laceration of scalp, sequela      No follow-ups on file. Subjective   SUBJECTIVE/OBJECTIVE:  Fall (fell Saturday hit head )  ED workup was negative and no sequela as yet, there was scalp bleeding at that time. Review of Systems   Constitutional: Negative for chills, diaphoresis and fever. HENT: Negative for ear discharge, ear pain, hearing loss, nosebleeds and tinnitus. Respiratory: Negative for cough, shortness of breath and wheezing. Cardiovascular: Negative for chest pain. Gastrointestinal: Negative for abdominal pain, blood in stool, constipation, diarrhea, nausea and vomiting. Genitourinary: Negative for dysuria, flank pain and hematuria. Musculoskeletal: Negative for myalgias. Skin: Negative for rash. Neurological: Negative for headaches. Hematological: Does not bruise/bleed easily. Psychiatric/Behavioral: Negative for hallucinations and suicidal ideas. Objective   /70   Pulse 85   Temp 97.9 °F (36.6 °C)   Resp 18   Ht 5' 5\" (1.651 m)   Wt 182 lb (82.6 kg)   LMP  (LMP Unknown)   SpO2 97%   Breastfeeding No   BMI 30.29 kg/m²   No results found for: LABA1C  Physical Exam       On this date 2021 I have spent 23 minutes reviewing previous notes, test results and face to face with the patient discussing the diagnosis and importance of compliance with the treatment plan as well as documenting on the day of the visit. An electronic signature was used to authenticate this note.     --Indira Short DO

## 2021-12-01 ENCOUNTER — TELEPHONE (OUTPATIENT)
Dept: FAMILY MEDICINE CLINIC | Age: 81
End: 2021-12-01
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
## 2022-01-03 DIAGNOSIS — I10 ESSENTIAL HYPERTENSION: ICD-10-CM

## 2022-01-03 RX ORDER — LISINOPRIL AND HYDROCHLOROTHIAZIDE 12.5; 1 MG/1; MG/1
1 TABLET ORAL DAILY
Qty: 90 TABLET | Refills: 1 | Status: SHIPPED
Start: 2022-01-03 | End: 2022-07-21 | Stop reason: SDUPTHER

## 2022-01-05 ENCOUNTER — TELEPHONE (OUTPATIENT)
Dept: FAMILY MEDICINE CLINIC | Age: 82
End: 2022-01-05

## 2022-01-05 NOTE — TELEPHONE ENCOUNTER
ingrid has been feeling dizzy for the past couple of weeks. She has been dizzy 3 times in this week alone. She wants to know if she should worry.

## 2022-02-16 ENCOUNTER — TELEPHONE (OUTPATIENT)
Dept: FAMILY MEDICINE CLINIC | Age: 82
End: 2022-02-16

## 2022-02-16 DIAGNOSIS — Z12.31 ENCOUNTER FOR SCREENING MAMMOGRAM FOR MALIGNANT NEOPLASM OF BREAST: Primary | ICD-10-CM

## 2022-02-16 NOTE — TELEPHONE ENCOUNTER
----- Message from Teri Luna sent at 2/16/2022  2:19 PM EST -----  Subject: Message to Provider    QUESTIONS  Information for Provider? Pt would like to know if she should get a   mammogram this year?  ---------------------------------------------------------------------------  --------------  CALL BACK INFO  What is the best way for the office to contact you? OK to leave message on   voicemail  Preferred Call Back Phone Number? [phone]  ---------------------------------------------------------------------------  --------------  SCRIPT ANSWERS  Relationship to Patient?  Self

## 2022-02-18 ENCOUNTER — TELEPHONE (OUTPATIENT)
Dept: FAMILY MEDICINE CLINIC | Age: 82
End: 2022-02-18

## 2022-02-24 ENCOUNTER — HOSPITAL ENCOUNTER (OUTPATIENT)
Dept: MAMMOGRAPHY | Age: 82
Discharge: HOME | End: 2022-02-26
Payer: MEDICARE

## 2022-02-24 VITALS — BODY MASS INDEX: 29.25 KG/M2 | HEIGHT: 66 IN | WEIGHT: 182 LBS

## 2022-02-24 DIAGNOSIS — Z12.31 ENCOUNTER FOR SCREENING MAMMOGRAM FOR MALIGNANT NEOPLASM OF BREAST: ICD-10-CM

## 2022-02-24 PROCEDURE — 77067 SCR MAMMO BI INCL CAD: CPT

## 2022-04-04 ENCOUNTER — TELEPHONE (OUTPATIENT)
Dept: FAMILY MEDICINE CLINIC | Age: 82
End: 2022-04-04

## 2022-04-04 DIAGNOSIS — E78.00 PURE HYPERCHOLESTEROLEMIA: ICD-10-CM

## 2022-04-04 RX ORDER — ATORVASTATIN CALCIUM 10 MG/1
10 TABLET, FILM COATED ORAL DAILY
Qty: 90 TABLET | Refills: 0 | Status: SHIPPED
Start: 2022-04-04 | End: 2022-07-21 | Stop reason: SDUPTHER

## 2022-07-19 ENCOUNTER — OFFICE VISIT (OUTPATIENT)
Dept: FAMILY MEDICINE CLINIC | Age: 82
End: 2022-07-19
Payer: MEDICARE

## 2022-07-19 VITALS
RESPIRATION RATE: 16 BRPM | HEART RATE: 66 BPM | DIASTOLIC BLOOD PRESSURE: 74 MMHG | BODY MASS INDEX: 31.65 KG/M2 | HEIGHT: 65 IN | OXYGEN SATURATION: 97 % | SYSTOLIC BLOOD PRESSURE: 128 MMHG | TEMPERATURE: 97.1 F | WEIGHT: 190 LBS

## 2022-07-19 DIAGNOSIS — M25.551 RIGHT HIP PAIN: Primary | ICD-10-CM

## 2022-07-19 PROCEDURE — G8427 DOCREV CUR MEDS BY ELIG CLIN: HCPCS | Performed by: NURSE PRACTITIONER

## 2022-07-19 PROCEDURE — 1090F PRES/ABSN URINE INCON ASSESS: CPT | Performed by: NURSE PRACTITIONER

## 2022-07-19 PROCEDURE — 1123F ACP DISCUSS/DSCN MKR DOCD: CPT | Performed by: NURSE PRACTITIONER

## 2022-07-19 PROCEDURE — 99213 OFFICE O/P EST LOW 20 MIN: CPT | Performed by: NURSE PRACTITIONER

## 2022-07-19 PROCEDURE — 1036F TOBACCO NON-USER: CPT | Performed by: NURSE PRACTITIONER

## 2022-07-19 PROCEDURE — G8417 CALC BMI ABV UP PARAM F/U: HCPCS | Performed by: NURSE PRACTITIONER

## 2022-07-19 PROCEDURE — G8399 PT W/DXA RESULTS DOCUMENT: HCPCS | Performed by: NURSE PRACTITIONER

## 2022-07-19 SDOH — ECONOMIC STABILITY: FOOD INSECURITY: WITHIN THE PAST 12 MONTHS, YOU WORRIED THAT YOUR FOOD WOULD RUN OUT BEFORE YOU GOT MONEY TO BUY MORE.: NEVER TRUE

## 2022-07-19 SDOH — ECONOMIC STABILITY: FOOD INSECURITY: WITHIN THE PAST 12 MONTHS, THE FOOD YOU BOUGHT JUST DIDN'T LAST AND YOU DIDN'T HAVE MONEY TO GET MORE.: NEVER TRUE

## 2022-07-19 ASSESSMENT — ENCOUNTER SYMPTOMS
SHORTNESS OF BREATH: 0
WHEEZING: 0
CONSTIPATION: 0
NAUSEA: 0
COUGH: 0
BACK PAIN: 0
VOMITING: 0
DIARRHEA: 0

## 2022-07-19 ASSESSMENT — PATIENT HEALTH QUESTIONNAIRE - PHQ9
SUM OF ALL RESPONSES TO PHQ QUESTIONS 1-9: 0
2. FEELING DOWN, DEPRESSED OR HOPELESS: 0
1. LITTLE INTEREST OR PLEASURE IN DOING THINGS: 0
SUM OF ALL RESPONSES TO PHQ9 QUESTIONS 1 & 2: 0
SUM OF ALL RESPONSES TO PHQ QUESTIONS 1-9: 0

## 2022-07-19 ASSESSMENT — SOCIAL DETERMINANTS OF HEALTH (SDOH): HOW HARD IS IT FOR YOU TO PAY FOR THE VERY BASICS LIKE FOOD, HOUSING, MEDICAL CARE, AND HEATING?: NOT HARD AT ALL

## 2022-07-19 NOTE — PROGRESS NOTES
Jodie Johnson (:  1940) is a 80 y.o. female,Established patient, here for evaluation of the following chief complaint(s):  Abdominal Pain (Right sided hernia with pain) and Memory Loss (Would like to talk about something to improve memory)         ASSESSMENT/PLAN:  1. Right hip pain  -     XR HIP RIGHT (2-3 VIEWS); Future    Return if symptoms worsen or fail to improve. Subjective   SUBJECTIVE/OBJECTIVE:  HPI  Here today for right groin pain. Reports that it comes and goes when she stands from a sitting position. Is worried about a hernia in that area she has been told she has. Denies difficulties bearing weight. No recent falls or trauma. /74   Pulse 66   Temp 97.1 °F (36.2 °C)   Resp 16   Ht 5' 5\" (1.651 m)   Wt 190 lb (86.2 kg)   LMP  (LMP Unknown)   SpO2 97%   BMI 31.62 kg/m²     Review of Systems   Constitutional:  Negative for activity change, appetite change, chills, diaphoresis, fever and unexpected weight change. Respiratory:  Negative for cough, shortness of breath and wheezing. Cardiovascular:  Negative for chest pain and palpitations. Gastrointestinal:  Negative for constipation, diarrhea, nausea and vomiting. Genitourinary:  Negative for difficulty urinating. Musculoskeletal:  Positive for arthralgias (right knee and right groin). Negative for back pain and neck pain. Neurological:  Negative for dizziness, weakness and headaches. Psychiatric/Behavioral:  Negative for dysphoric mood and sleep disturbance. The patient is not nervous/anxious. Objective   Physical Exam  HENT:      Head: Normocephalic and atraumatic. Pulmonary:      Effort: Pulmonary effort is normal. No respiratory distress. Musculoskeletal:         General: No tenderness. Normal range of motion. Cervical back: Normal range of motion. Comments: No tenderness with right hip abduction, adduction or straight leg raise   Neurological:      Mental Status: She is alert. [facility]      An electronic signature was used to authenticate this note.     --Huy Carreon, MARVIN - CNP

## 2022-07-20 DIAGNOSIS — M25.551 RIGHT HIP PAIN: Primary | ICD-10-CM

## 2022-07-20 DIAGNOSIS — M16.11 OSTEOARTHRITIS OF RIGHT HIP, UNSPECIFIED OSTEOARTHRITIS TYPE: ICD-10-CM

## 2022-07-21 DIAGNOSIS — E78.00 PURE HYPERCHOLESTEROLEMIA: ICD-10-CM

## 2022-07-21 DIAGNOSIS — I10 ESSENTIAL HYPERTENSION: ICD-10-CM

## 2022-07-21 RX ORDER — LISINOPRIL AND HYDROCHLOROTHIAZIDE 12.5; 1 MG/1; MG/1
1 TABLET ORAL DAILY
Qty: 90 TABLET | Refills: 1 | Status: SHIPPED | OUTPATIENT
Start: 2022-07-21

## 2022-07-21 RX ORDER — ATORVASTATIN CALCIUM 10 MG/1
10 TABLET, FILM COATED ORAL DAILY
Qty: 90 TABLET | Refills: 0 | Status: SHIPPED
Start: 2022-07-21 | End: 2022-10-03 | Stop reason: SDUPTHER

## 2022-08-11 ENCOUNTER — OFFICE VISIT (OUTPATIENT)
Dept: FAMILY MEDICINE CLINIC | Age: 82
End: 2022-08-11
Payer: MEDICARE

## 2022-08-11 ENCOUNTER — HOSPITAL ENCOUNTER (OUTPATIENT)
Age: 82
Discharge: HOME | End: 2022-08-11
Payer: MEDICARE

## 2022-08-11 VITALS
HEIGHT: 65 IN | HEART RATE: 80 BPM | OXYGEN SATURATION: 95 % | TEMPERATURE: 96.5 F | DIASTOLIC BLOOD PRESSURE: 66 MMHG | SYSTOLIC BLOOD PRESSURE: 122 MMHG | RESPIRATION RATE: 18 BRPM | WEIGHT: 189 LBS | BODY MASS INDEX: 31.49 KG/M2

## 2022-08-11 DIAGNOSIS — R73.09 ELEVATED GLUCOSE: ICD-10-CM

## 2022-08-11 DIAGNOSIS — R20.2 NUMBNESS AND TINGLING: ICD-10-CM

## 2022-08-11 DIAGNOSIS — R53.83 FATIGUE, UNSPECIFIED TYPE: ICD-10-CM

## 2022-08-11 DIAGNOSIS — R07.81 RIB PAIN ON RIGHT SIDE: ICD-10-CM

## 2022-08-11 DIAGNOSIS — M85.80 OSTEOPENIA, UNSPECIFIED LOCATION: Primary | ICD-10-CM

## 2022-08-11 DIAGNOSIS — R20.0 NUMBNESS AND TINGLING: ICD-10-CM

## 2022-08-11 DIAGNOSIS — R30.0 DIFFICULT OR PAINFUL URINATION: ICD-10-CM

## 2022-08-11 DIAGNOSIS — Z13.820 OSTEOPOROSIS SCREENING: ICD-10-CM

## 2022-08-11 DIAGNOSIS — R41.3 MEMORY CHANGE: ICD-10-CM

## 2022-08-11 DIAGNOSIS — N30.01 ACUTE CYSTITIS WITH HEMATURIA: ICD-10-CM

## 2022-08-11 DIAGNOSIS — Z78.0 POSTMENOPAUSAL: ICD-10-CM

## 2022-08-11 LAB
ALBUMIN SERPL-MCNC: 4.4 G/DL (ref 3.5–5.2)
ALP BLD-CCNC: 154 U/L (ref 35–104)
ALT SERPL-CCNC: 12 U/L (ref 0–32)
ANION GAP SERPL CALCULATED.3IONS-SCNC: 8 MMOL/L (ref 7–16)
AST SERPL-CCNC: 16 U/L (ref 0–31)
BASOPHILS ABSOLUTE: 0.02 E9/L (ref 0–0.2)
BASOPHILS RELATIVE PERCENT: 0.3 % (ref 0–2)
BILIRUB SERPL-MCNC: 0.6 MG/DL (ref 0–1.2)
BILIRUBIN, POC: NORMAL
BLOOD URINE, POC: NORMAL
BUN BLDV-MCNC: 22 MG/DL (ref 6–23)
CALCIUM SERPL-MCNC: 9.5 MG/DL (ref 8.6–10.2)
CHLORIDE BLD-SCNC: 100 MMOL/L (ref 98–107)
CLARITY, POC: CLEAR
CO2: 29 MMOL/L (ref 22–29)
COLOR, POC: NORMAL
CREAT SERPL-MCNC: 0.9 MG/DL (ref 0.5–1)
EOSINOPHILS ABSOLUTE: 0.1 E9/L (ref 0.05–0.5)
EOSINOPHILS RELATIVE PERCENT: 1.7 % (ref 0–6)
FOLATE: >20 NG/ML (ref 4.8–24.2)
GFR AFRICAN AMERICAN: >60
GFR NON-AFRICAN AMERICAN: >60 ML/MIN/1.73
GLUCOSE BLD-MCNC: 101 MG/DL (ref 74–99)
GLUCOSE URINE, POC: NORMAL
HCT VFR BLD CALC: 42.9 % (ref 34–48)
HEMOGLOBIN: 14.4 G/DL (ref 11.5–15.5)
IMMATURE GRANULOCYTES #: 0.02 E9/L
IMMATURE GRANULOCYTES %: 0.3 % (ref 0–5)
KETONES, POC: NORMAL
LEUKOCYTE EST, POC: NORMAL
LYMPHOCYTES ABSOLUTE: 1.4 E9/L (ref 1.5–4)
LYMPHOCYTES RELATIVE PERCENT: 24.2 % (ref 20–42)
MCH RBC QN AUTO: 29.9 PG (ref 26–35)
MCHC RBC AUTO-ENTMCNC: 33.6 % (ref 32–34.5)
MCV RBC AUTO: 89 FL (ref 80–99.9)
MONOCYTES ABSOLUTE: 0.51 E9/L (ref 0.1–0.95)
MONOCYTES RELATIVE PERCENT: 8.8 % (ref 2–12)
NEUTROPHILS ABSOLUTE: 3.74 E9/L (ref 1.8–7.3)
NEUTROPHILS RELATIVE PERCENT: 64.7 % (ref 43–80)
NITRITE, POC: NORMAL
PDW BLD-RTO: 12.3 FL (ref 11.5–15)
PH, POC: 5
PLATELET # BLD: 214 E9/L (ref 130–450)
PMV BLD AUTO: 11 FL (ref 7–12)
POTASSIUM SERPL-SCNC: 4.1 MMOL/L (ref 3.5–5)
PROTEIN, POC: NORMAL
RBC # BLD: 4.82 E12/L (ref 3.5–5.5)
SODIUM BLD-SCNC: 137 MMOL/L (ref 132–146)
SPECIFIC GRAVITY, POC: >=1.03
TOTAL PROTEIN: 7 G/DL (ref 6.4–8.3)
TSH SERPL DL<=0.05 MIU/L-ACNC: 1.56 UIU/ML (ref 0.27–4.2)
UROBILINOGEN, POC: 0.2
VITAMIN B-12: 783 PG/ML (ref 211–946)
WBC # BLD: 5.8 E9/L (ref 4.5–11.5)

## 2022-08-11 PROCEDURE — 80053 COMPREHEN METABOLIC PANEL: CPT

## 2022-08-11 PROCEDURE — 99214 OFFICE O/P EST MOD 30 MIN: CPT | Performed by: NURSE PRACTITIONER

## 2022-08-11 PROCEDURE — 82607 VITAMIN B-12: CPT

## 2022-08-11 PROCEDURE — 82746 ASSAY OF FOLIC ACID SERUM: CPT

## 2022-08-11 PROCEDURE — 85025 COMPLETE CBC W/AUTO DIFF WBC: CPT

## 2022-08-11 PROCEDURE — 1036F TOBACCO NON-USER: CPT | Performed by: NURSE PRACTITIONER

## 2022-08-11 PROCEDURE — G8427 DOCREV CUR MEDS BY ELIG CLIN: HCPCS | Performed by: NURSE PRACTITIONER

## 2022-08-11 PROCEDURE — 36415 COLL VENOUS BLD VENIPUNCTURE: CPT

## 2022-08-11 PROCEDURE — G8399 PT W/DXA RESULTS DOCUMENT: HCPCS | Performed by: NURSE PRACTITIONER

## 2022-08-11 PROCEDURE — 84443 ASSAY THYROID STIM HORMONE: CPT

## 2022-08-11 PROCEDURE — G8417 CALC BMI ABV UP PARAM F/U: HCPCS | Performed by: NURSE PRACTITIONER

## 2022-08-11 PROCEDURE — 1123F ACP DISCUSS/DSCN MKR DOCD: CPT | Performed by: NURSE PRACTITIONER

## 2022-08-11 PROCEDURE — 1090F PRES/ABSN URINE INCON ASSESS: CPT | Performed by: NURSE PRACTITIONER

## 2022-08-11 PROCEDURE — 81002 URINALYSIS NONAUTO W/O SCOPE: CPT | Performed by: NURSE PRACTITIONER

## 2022-08-11 RX ORDER — SULFAMETHOXAZOLE AND TRIMETHOPRIM 800; 160 MG/1; MG/1
1 TABLET ORAL 2 TIMES DAILY
Qty: 20 TABLET | Refills: 0 | Status: SHIPPED | OUTPATIENT
Start: 2022-08-11 | End: 2022-08-21

## 2022-08-11 RX ORDER — M-VIT,TX,IRON,MINS/CALC/FOLIC 27MG-0.4MG
1 TABLET ORAL DAILY
COMMUNITY

## 2022-08-11 ASSESSMENT — ENCOUNTER SYMPTOMS
SHORTNESS OF BREATH: 0
VOMITING: 0
CONSTIPATION: 0
DIARRHEA: 0
COUGH: 0
BACK PAIN: 0
WHEEZING: 0
NAUSEA: 0

## 2022-08-11 ASSESSMENT — PATIENT HEALTH QUESTIONNAIRE - PHQ9
SUM OF ALL RESPONSES TO PHQ9 QUESTIONS 1 & 2: 0
2. FEELING DOWN, DEPRESSED OR HOPELESS: 0
SUM OF ALL RESPONSES TO PHQ QUESTIONS 1-9: 0
1. LITTLE INTEREST OR PLEASURE IN DOING THINGS: 0
SUM OF ALL RESPONSES TO PHQ QUESTIONS 1-9: 0

## 2022-08-11 NOTE — PROGRESS NOTES
Bob Sands (:  1940) is a 80 y.o. female,Established patient, here for evaluation of the following chief complaint(s):  Lower Back Pain         ASSESSMENT/PLAN:  1. Osteopenia, unspecified location  -     DEXA BONE DENSITY AXIAL SKELETON; Future  2. Rib pain on right side  -     XR RIBS RIGHT INCLUDE CHEST (MIN 3 VIEWS); Future  3. Memory change  -     Lyssa Buenrostro MD, Geriatric Assessment, [facility]  - Treated today for UTI but memory issues have preceded UTI by many months, therefore low suspicion that memory issues can all be attributed to UTI  - MMSE  however, had to repeat instructions multiple times throughout appt, not sure how patient scored perfect given her memory issues in office and reports that she is getting lost driving to and from places that she knows well  4. Postmenopausal  -     DEXA BONE DENSITY AXIAL SKELETON; Future  5. Osteoporosis screening  -     DEXA BONE DENSITY AXIAL SKELETON; Future  6. Difficult or painful urination  -     POCT Urinalysis no Micro  7. Acute cystitis with hematuria  -     sulfamethoxazole-trimethoprim (BACTRIM DS) 800-160 MG per tablet; Take 1 tablet by mouth in the morning and 1 tablet before bedtime. Do all this for 10 days. , Disp-20 tablet, R-0Normal  - Reviewed side effects of medication and patient verbalizes understanding.   - Advised to call back directly if there are further questions, or if these symptoms fail to improve as anticipated or worsen. 8. Fatigue, unspecified type  -     CBC with Auto Differential; Future  -     TSH; Future  9. Elevated glucose  -     Comprehensive Metabolic Panel; Future  10. Numbness and tingling  -     Vitamin B12 & Folate; Future    Return if symptoms worsen or fail to improve. Subjective   SUBJECTIVE/OBJECTIVE:  HPI  Here today for right sided pain. Reports that she did not wear her bra due to irritation. She is having trouble moving around in bed at night.  She has been doing exercises to help stretch muscles. Denies rash or history of shingles. Last dexa 2019. Has concerns about her memory. She got lost recently going somewhere she normally goes. /66   Pulse 80   Temp (!) 96.5 °F (35.8 °C)   Resp 18   Ht 5' 5\" (1.651 m)   Wt 189 lb (85.7 kg)   LMP  (LMP Unknown)   SpO2 95%   BMI 31.45 kg/m²     Review of Systems   Constitutional:  Negative for activity change, appetite change, chills, diaphoresis, fever and unexpected weight change. Respiratory:  Negative for cough, shortness of breath and wheezing. Cardiovascular:  Negative for chest pain and palpitations. Gastrointestinal:  Negative for constipation, diarrhea, nausea and vomiting. Genitourinary:  Positive for frequency and urgency. Negative for difficulty urinating and dysuria. Musculoskeletal:  Negative for arthralgias, back pain and neck pain. Neurological:  Negative for dizziness, weakness and headaches. Memory concerns   Psychiatric/Behavioral:  Positive for confusion. Negative for dysphoric mood and sleep disturbance. The patient is not nervous/anxious. Objective   Physical Exam  Constitutional:       Appearance: She is well-developed. HENT:      Head: Normocephalic and atraumatic. Cardiovascular:      Rate and Rhythm: Normal rate and regular rhythm. Heart sounds: No murmur heard. Pulmonary:      Effort: Pulmonary effort is normal. No respiratory distress. Breath sounds: Normal breath sounds. Abdominal:      General: Bowel sounds are normal.      Palpations: Abdomen is soft. Tenderness: There is no abdominal tenderness. Musculoskeletal:         General: Normal range of motion. Skin:     General: Skin is warm and dry. Neurological:      Mental Status: She is alert and oriented to person, place, and time.    Psychiatric:         Behavior: Behavior normal.          On this date 8/11/2022 I have spent 30 minutes reviewing previous notes, test results and face to face with the patient discussing the diagnosis and importance of compliance with the treatment plan as well as documenting on the day of the visit. An electronic signature was used to authenticate this note.     --MARVIN Morley - CNP

## 2022-08-15 ENCOUNTER — OFFICE VISIT (OUTPATIENT)
Dept: ORTHOPEDIC SURGERY | Age: 82
End: 2022-08-15
Payer: MEDICARE

## 2022-08-15 VITALS — TEMPERATURE: 98 F | HEIGHT: 65 IN | WEIGHT: 191 LBS | BODY MASS INDEX: 31.82 KG/M2

## 2022-08-15 DIAGNOSIS — M16.11 PRIMARY OSTEOARTHRITIS OF RIGHT HIP: Primary | ICD-10-CM

## 2022-08-15 PROCEDURE — G8417 CALC BMI ABV UP PARAM F/U: HCPCS | Performed by: ORTHOPAEDIC SURGERY

## 2022-08-15 PROCEDURE — 1090F PRES/ABSN URINE INCON ASSESS: CPT | Performed by: ORTHOPAEDIC SURGERY

## 2022-08-15 PROCEDURE — 1123F ACP DISCUSS/DSCN MKR DOCD: CPT | Performed by: ORTHOPAEDIC SURGERY

## 2022-08-15 PROCEDURE — 99203 OFFICE O/P NEW LOW 30 MIN: CPT | Performed by: ORTHOPAEDIC SURGERY

## 2022-08-15 PROCEDURE — G8427 DOCREV CUR MEDS BY ELIG CLIN: HCPCS | Performed by: ORTHOPAEDIC SURGERY

## 2022-08-15 PROCEDURE — 1036F TOBACCO NON-USER: CPT | Performed by: ORTHOPAEDIC SURGERY

## 2022-08-15 PROCEDURE — G8399 PT W/DXA RESULTS DOCUMENT: HCPCS | Performed by: ORTHOPAEDIC SURGERY

## 2022-08-15 NOTE — PROGRESS NOTES
Chief Complaint   Patient presents with    Hip Pain     Rt sided pain started last week 9 pain scale but is  better now. Rt side hurts at night. Has problem ambulating. Is now on Bactrim for UTI. Shamika Callahan  Is a 80 y.o.  female who presents today complaining of right hip pain. She states that the pain began 1  year(s) ago. She did not have a history of injury. Patients states pain is located anterior and has tenderness over the  Anterior portion of the hip. Hip pain is described as aching and  is worse with weight bearing along with groin discomfort. She states the pain occurs in the  continuous. Patient states hip pain is relieved by rest. Patient does not have a history of back pain. The patient does not use ambulatory aid. The patients occupation is retired. Past Medical History:   Diagnosis Date    Asthma     Hernia     Hypertension      Past Surgical History:   Procedure Laterality Date    BLADDER SURGERY      BREAST SURGERY Left     benign    DILATION AND CURETTAGE OF UTERUS      FRACTURE SURGERY      right arm    HYSTERECTOMY (CERVIX STATUS UNKNOWN)      KNEE CARTILAGE SURGERY         Current Outpatient Medications:     Multiple Vitamins-Minerals (THERAPEUTIC MULTIVITAMIN-MINERALS) tablet, Take 1 tablet by mouth in the morning., Disp: , Rfl:     COD LIVER OIL PO, Take by mouth daily, Disp: , Rfl:     sulfamethoxazole-trimethoprim (BACTRIM DS) 800-160 MG per tablet, Take 1 tablet by mouth in the morning and 1 tablet before bedtime. Do all this for 10 days. , Disp: 20 tablet, Rfl: 0    atorvastatin (LIPITOR) 10 MG tablet, Take 1 tablet by mouth in the morning., Disp: 90 tablet, Rfl: 0    lisinopril-hydroCHLOROthiazide (PRINZIDE;ZESTORETIC) 10-12.5 MG per tablet, Take 1 tablet by mouth in the morning., Disp: 90 tablet, Rfl: 1    albuterol sulfate  (90 BASE) MCG/ACT inhaler, Inhale 2 puffs into the lungs every 6 hours as needed for Wheezing, Disp: , Rfl:     vitamin B-12 (CYANOCOBALAMIN) 1000 MCG tablet, Take 1,000 mcg by mouth once a week 2x/weekly, Disp: , Rfl:     aspirin 81 MG chewable tablet, Take 81 mg by mouth daily, Disp: , Rfl:     Cholecalciferol (VITAMIN D3) 400 UNIT/ML LIQD, Take 5 drops by mouth daily, Disp: , Rfl:     COCONUT OIL PO, Take by mouth, Disp: , Rfl:   No Known Allergies  Social History     Socioeconomic History    Marital status: Single     Spouse name: Not on file    Number of children: Not on file    Years of education: Not on file    Highest education level: Not on file   Occupational History    Not on file   Tobacco Use    Smoking status: Former     Packs/day: 0.40     Years: 20.00     Pack years: 8.00     Types: Cigarettes     Quit date: 38     Years since quittin.6    Smokeless tobacco: Never   Substance and Sexual Activity    Alcohol use: No     Alcohol/week: 0.0 standard drinks    Drug use: No    Sexual activity: Not on file   Other Topics Concern    Not on file   Social History Narrative    Not on file     Social Determinants of Health     Financial Resource Strain: Low Risk     Difficulty of Paying Living Expenses: Not hard at all   Food Insecurity: No Food Insecurity    Worried About Running Out of Food in the Last Year: Never true    Ran Out of Food in the Last Year: Never true   Transportation Needs: Not on file   Physical Activity: Not on file   Stress: Not on file   Social Connections: Not on file   Intimate Partner Violence: Not on file   Housing Stability: Not on file     Family History   Problem Relation Age of Onset    Lung Cancer Father     Kidney Cancer Sister     Kidney Cancer Brother          REVIEW OF SYSTEMS:     General/Constitution:  (-)weight loss, (-)fever, (-)chills, (-)weakness. Skin: (-) rash,(-) psoriasis,(-) eczema, (-)skin cancer.    Musculoskeletal: (-) fractures,  (-) dislocations,(-) collagen vascular disease, (-) fibromyalgia, (-) multiple sclerosis, (-) muscular dystrophy, (-) RSD,(-) joint pain (-)swelling, (-) joint pain,swelling. Neurologic: (-) epilepsy, (-)seizures,(-) brain tumor,(-) TIA, (-)stroke, (-)headaches, (-)Parkinson disease,(-) memory loss, (-) LOC. Cardiovascular: (-) Chest pain, (-) swelling in legs/feet, (-) SOB, (-) cramping in legs/feet with walking. Respiratory: (-) SOB, (-) Coughing, (-) night sweats. GI: (-) nausea, (-) vomiting, (-) diarrhea, (-) blood in stool, (-) gastric ulcer. Psychiatric: (-) Depression, (-) Anxiety, (-) bipolar disease, (-) Alzheimer's Disease  Allergic/Immunologic: (-) allergies latex, (-) allergies metal, (-) skin sensitivity. Hematlogic: (-) anemia, (-) blood transfusion, (-) DVT/PE, (-) Clotting disorders      Subjective:    Constitution:    Temp 98 °F (36.7 °C)   Ht 5' 5\" (1.651 m)   Wt 191 lb (86.6 kg)   LMP  (LMP Unknown)   BMI 31.78 kg/m²     Psycihatric:  The patient is alert and oriented x 3, appears to be stated age and in no distress. Respiratory:  Respiratory effort is not labored. Patient is not gasping. Palpation of the chest reveals no tactile fremitus. Skin:  Upon inspection: the skin appears warm, dry and intact. There is not a previous scar over the affected area. There is not any cellulitis, lymphedema or cutaneous lesions noted in the lower extremities. Upon palpation there is no induration noted. Neurologic:  Motor exam of the lower extremities show ; quadriceps, hamstrings, foot dorsi and plantar flexors intact R.  5/5 and L. 5/5. Deep tendon reflexes are 2/4 at the knees and 2/4 at the ankles with strong extensor hallicus longus motor strength bilaterally. Sensory to both feet is intact to all sensory roots. Cardiovascular: The vascular exam is normal and is well perfused to distal extremities. Distal pulses DP/PT: R. 2+; L. 2+. There is cap refill noted less than two seconds in all digits. There is not edema of the bilateral lower extremities. There is not varicosities noted in the distal extremities. Lymph:  Upon palpation,  there is no lymphadenopathy noted in bilateral lower extremities. Musculoskeletal:  Gait: antalgic;  Examination of the digits and nails reveal no cyanosis or clubbing    Lumbar exam:  On visual inspection, there is not deformity of the spine. full range of motion, no tenderness, palpable spasm or pain on motion. Special tests: Straight Leg Raise negative, Donna test negative. Hip exam:  Upon visual inspection there is not a deformity noted. Patient complains of tenderness at the  groin. Exam of the right hip shows none leg length discrepancy. Range of motion of the involved/uninvolved hip is 10 degrees internal rotation and 15 degrees external rotation/20 degrees internal rotation and 30 degrees external rotation, the hip joint is stable to testing. Strength of the lower extremity is limited by pain. The patient does not have ipsilateral knee pain, and is described as  none. Hip exam- Gait: antalgic; Strength: Hip Flexors 5/5; Hip Abductors 5/5; Hip Adduction 5/5. Knee exam :  Upon visual inspection there is not deformity noted. She does not have  pain on motion, there is not tenderness over the  medial, lateral, anterior region. Range of motion of R. Knee is 0 to 120, and L. Knee is 0 to 120. there are not any masses, there is not ligamentous instability, there is not  deformity noted. Xrays: Moderate to severe hip arthritis    Radiographic findings reviewed with patient    Impression:  Encounter Diagnoses   Name Primary? Primary osteoarthritis of right hip Yes       Plan:  Natural history and expected course discussed. Questions answered. Educational materials distributed. Home exercises discussed. NSAIDs per medication orders. I had a lengthy discussion with the patient regarding their diagnosis. I explained treatment options including surgical vs non surgical treatment.  I reviewed in detail the risks and benefits and outlined the procedure in detail with expected outcomes and possible complications. I also discussed non surgical treatment such as injections (CSI and visco supplementation), physical therapy, topical creams and NSAID's. They have elected for conservative management at this time.    Csi right hip under fluro  Fu in 6 weeks

## 2022-08-16 ENCOUNTER — APPOINTMENT (OUTPATIENT)
Dept: GENERAL RADIOLOGY | Age: 82
End: 2022-08-16
Payer: MEDICARE

## 2022-08-16 ENCOUNTER — APPOINTMENT (OUTPATIENT)
Dept: CT IMAGING | Age: 82
End: 2022-08-16
Payer: MEDICARE

## 2022-08-16 ENCOUNTER — HOSPITAL ENCOUNTER (EMERGENCY)
Age: 82
Discharge: ANOTHER HOSPITAL | End: 2022-08-16
Attending: EMERGENCY MEDICINE
Payer: MEDICARE

## 2022-08-16 VITALS
DIASTOLIC BLOOD PRESSURE: 42 MMHG | BODY MASS INDEX: 31.78 KG/M2 | RESPIRATION RATE: 20 BRPM | TEMPERATURE: 97.9 F | OXYGEN SATURATION: 96 % | WEIGHT: 191 LBS | HEART RATE: 71 BPM | SYSTOLIC BLOOD PRESSURE: 111 MMHG

## 2022-08-16 DIAGNOSIS — S62.101A WRIST FRACTURE, BILATERAL, CLOSED, INITIAL ENCOUNTER: Primary | ICD-10-CM

## 2022-08-16 DIAGNOSIS — S62.102A WRIST FRACTURE, BILATERAL, CLOSED, INITIAL ENCOUNTER: Primary | ICD-10-CM

## 2022-08-16 PROCEDURE — 73110 X-RAY EXAM OF WRIST: CPT

## 2022-08-16 PROCEDURE — 70450 CT HEAD/BRAIN W/O DYE: CPT

## 2022-08-16 PROCEDURE — 73090 X-RAY EXAM OF FOREARM: CPT

## 2022-08-16 PROCEDURE — 2500000003 HC RX 250 WO HCPCS

## 2022-08-16 PROCEDURE — 96374 THER/PROPH/DIAG INJ IV PUSH: CPT

## 2022-08-16 PROCEDURE — 99285 EMERGENCY DEPT VISIT HI MDM: CPT

## 2022-08-16 PROCEDURE — 6360000002 HC RX W HCPCS: Performed by: EMERGENCY MEDICINE

## 2022-08-16 PROCEDURE — 96375 TX/PRO/DX INJ NEW DRUG ADDON: CPT

## 2022-08-16 PROCEDURE — 72125 CT NECK SPINE W/O DYE: CPT

## 2022-08-16 PROCEDURE — 2580000003 HC RX 258: Performed by: EMERGENCY MEDICINE

## 2022-08-16 PROCEDURE — 29125 APPL SHORT ARM SPLINT STATIC: CPT

## 2022-08-16 RX ORDER — LIDOCAINE HYDROCHLORIDE AND EPINEPHRINE 10; 10 MG/ML; UG/ML
INJECTION, SOLUTION INFILTRATION; PERINEURAL
Status: COMPLETED
Start: 2022-08-16 | End: 2022-08-16

## 2022-08-16 RX ORDER — HYDROCODONE BITARTRATE AND ACETAMINOPHEN 5; 325 MG/1; MG/1
1 TABLET ORAL EVERY 6 HOURS PRN
Qty: 12 TABLET | Refills: 0 | Status: SHIPPED | OUTPATIENT
Start: 2022-08-16 | End: 2022-08-23 | Stop reason: SDUPTHER

## 2022-08-16 RX ORDER — FENTANYL CITRATE 0.05 MG/ML
50 INJECTION, SOLUTION INTRAMUSCULAR; INTRAVENOUS ONCE
Status: COMPLETED | OUTPATIENT
Start: 2022-08-16 | End: 2022-08-16

## 2022-08-16 RX ORDER — PROPOFOL 10 MG/ML
1 INJECTION, EMULSION INTRAVENOUS ONCE
Status: COMPLETED | OUTPATIENT
Start: 2022-08-16 | End: 2022-08-16

## 2022-08-16 RX ORDER — 0.9 % SODIUM CHLORIDE 0.9 %
1000 INTRAVENOUS SOLUTION INTRAVENOUS ONCE
Status: COMPLETED | OUTPATIENT
Start: 2022-08-16 | End: 2022-08-16

## 2022-08-16 RX ORDER — LIDOCAINE HYDROCHLORIDE AND EPINEPHRINE 10; 10 MG/ML; UG/ML
20 INJECTION, SOLUTION INFILTRATION; PERINEURAL ONCE
Status: COMPLETED | OUTPATIENT
Start: 2022-08-16 | End: 2022-08-16

## 2022-08-16 RX ADMIN — PROPOFOL 87 MG: 10 INJECTION, EMULSION INTRAVENOUS at 12:05

## 2022-08-16 RX ADMIN — LIDOCAINE HYDROCHLORIDE AND EPINEPHRINE: 10; 10 INJECTION, SOLUTION INFILTRATION; PERINEURAL at 14:51

## 2022-08-16 RX ADMIN — LIDOCAINE HYDROCHLORIDE,EPINEPHRINE BITARTRATE: 10; .01 INJECTION, SOLUTION INFILTRATION; PERINEURAL at 14:51

## 2022-08-16 RX ADMIN — SODIUM CHLORIDE 1000 ML: 9 INJECTION, SOLUTION INTRAVENOUS at 12:05

## 2022-08-16 RX ADMIN — FENTANYL CITRATE 50 MCG: 50 INJECTION INTRAMUSCULAR; INTRAVENOUS at 08:42

## 2022-08-16 ASSESSMENT — PAIN DESCRIPTION - ORIENTATION: ORIENTATION: RIGHT

## 2022-08-16 ASSESSMENT — ENCOUNTER SYMPTOMS
SHORTNESS OF BREATH: 0
CHEST TIGHTNESS: 0
ABDOMINAL PAIN: 0

## 2022-08-16 ASSESSMENT — PAIN DESCRIPTION - LOCATION: LOCATION: WRIST

## 2022-08-16 ASSESSMENT — PAIN SCALES - GENERAL
PAINLEVEL_OUTOF10: 5
PAINLEVEL_OUTOF10: 10

## 2022-08-16 ASSESSMENT — PAIN DESCRIPTION - PAIN TYPE: TYPE: ACUTE PAIN

## 2022-08-16 ASSESSMENT — PAIN - FUNCTIONAL ASSESSMENT: PAIN_FUNCTIONAL_ASSESSMENT: 0-10

## 2022-08-16 NOTE — ED PROVIDER NOTES
82-year-old female presenting after a fall that occurred going to her doctor's office. She was going down some steps, lost her balance in the last step and fell forward. She struck the right side of her head and has pain and swelling in injury to the right wrist.  She is not on blood thinning medication. This was a sudden onset, it was resolved now, ongoing discomfort, associated with swelling to the right wrist and a laceration abrasion to the right eyebrow. Family History   Problem Relation Age of Onset    Lung Cancer Father     Kidney Cancer Sister     Kidney Cancer Brother      Past Surgical History:   Procedure Laterality Date    BLADDER SURGERY      BREAST SURGERY Left     benign    DILATION AND CURETTAGE OF UTERUS      FRACTURE SURGERY      right arm    HYSTERECTOMY (CERVIX STATUS UNKNOWN)      KNEE CARTILAGE SURGERY         Review of Systems   Constitutional:  Negative for chills and fever. HENT:          Laceration by the right eyebrow   Respiratory:  Negative for chest tightness and shortness of breath. Cardiovascular:  Negative for chest pain. Gastrointestinal:  Negative for abdominal pain. Musculoskeletal:         Right wrist pain   All other systems reviewed and are negative. Physical Exam  Constitutional:       General: She is not in acute distress. Appearance: She is well-developed. HENT:      Head: Normocephalic. Comments: Laceration/abrasion to the right eyebrow  Eyes:      Conjunctiva/sclera: Conjunctivae normal.      Pupils: Pupils are equal, round, and reactive to light. Neck:      Thyroid: No thyromegaly. Cardiovascular:      Rate and Rhythm: Normal rate and regular rhythm. Pulmonary:      Effort: Pulmonary effort is normal. No respiratory distress. Breath sounds: Normal breath sounds. Abdominal:      General: There is no distension. Palpations: Abdomen is soft. Tenderness: There is no abdominal tenderness. There is no guarding or rebound. Musculoskeletal:         General: Swelling, tenderness and deformity present. No signs of injury. Cervical back: Normal range of motion. Comments: Strong radial pulse on the right side present, able to move the fingers, deformity to the right wrist   Skin:     General: Skin is warm and dry. Findings: No erythema. Neurological:      Mental Status: She is alert and oriented to person, place, and time. Cranial Nerves: No cranial nerve deficit. Coordination: Coordination normal.        Procedures     MDM         Conscious Sedation Procedure Note    Indication: fracture dislocation and procedural pain management    Consent: I have discussed with the patient and/or the patient representative the indication, alternatives, and the possible risks and/or complications of the planned procedure and the anesthesia methods. The patient and/or patient representative appear to understand and agree to proceed. Physician Involvement: The attending physician was present and supervising this procedure. Pre-Sedation Documentation and Exam:  Time: 1300  I have personally completed a history, physical exam & review of systems for this patient (see notes). Airway Assessment: normal, dentition not prohibitive, normal neck range of motion    Prior History of Anesthesia Complications: none    ASA Classification: Class 2 - A normal healthy patient with mild systemic disease    Sedation/ Anesthesia Plan: intravenous sedation    Medications Used: propofol intravenously    Monitoring and Safety: The patient was placed on a cardiac monitor and vital signs, pulse oximetry and level of consciousness were continuously evaluated throughout the procedure. The patient was closely monitored until recovery from the medications was complete and the patient had returned to baseline status. Respiratory therapy was on standby at all times during the procedure.     (The following sections must be completed)  Post-Sedation Vital Signs: Vital signs were reviewed and were stable after the procedure (see flow sheet for vitals)            Post-Sedation Exam:   Time: 1500. Lungs: clear and Cardiovascular: regular rate and rhythm           Complications: none                  Joint Reduction Procedure Note    Indication: fracture    Consent: The patient was counseled regarding the procedure, it's indications, risks, potential complications and alternatives and any questions were answered. Consent was obtained. Procedure: The pre-reduction exam showed distal perfusion & neurologic function to be normal. The patient was placed in the appropriate position. Anesthesia/pain control was obtained using conscious sedation -SEE CONSCIOUS SEDATION NOTE FOR DETAILS. Reduction of the right radius and ulna was performed by traction and counter traction. Post reduction films were obtained and revealed satisfactory reduction. A post-reduction exam revealed distal perfusion & neurologic function to be normal. The affected area was immobilized with sugar tong splint. The patient tolerated the procedure well. Complications: None            PROCEDURE NOTE  8/16/22       Time: 0553    SPLINT  APPLICATION  Risks, benefits and alternatives (for applicable procedures below) described. Performed By: EM Attending Physician and Orthopaedic Resident. Indication:  fracture of right radius and ulna . Procedure:   A short  right arm  sugar tong splint was applied by me and ortho resident. The patient tolerated the procedure well.              --------------------------------------------- PAST HISTORY ---------------------------------------------  Past Medical History:  has a past medical history of Asthma, Hernia, and Hypertension. Past Surgical History:  has a past surgical history that includes Hysterectomy; Knee cartilage surgery; Bladder surgery; Dilation and curettage of uterus; fracture surgery; and Breast surgery (Left).     Social History: reports that she quit smoking about 42 years ago. Her smoking use included cigarettes. She has a 8.00 pack-year smoking history. She has never used smokeless tobacco. She reports that she does not drink alcohol and does not use drugs. Family History: family history includes Kidney Cancer in her brother and sister; Janes Cooper in her father. The patients home medications have been reviewed. Allergies: Patient has no known allergies. -------------------------------------------------- RESULTS -------------------------------------------------  Labs:  No results found for this visit on 08/16/22. Radiology:  XR WRIST RIGHT (MIN 3 VIEWS)   Final Result   Near anatomic alignment, post closed reduction. XR WRIST RIGHT (MIN 3 VIEWS)   Final Result   Acute distal right radius and ulna fractures. CT HEAD WO CONTRAST   Final Result   1. There is no acute intracranial abnormality. Specifically, there is no   intracranial hemorrhage. 2. Atrophy and periventricular leukomalacia,   . CT CERVICAL SPINE WO CONTRAST   Final Result   1. There is no acute compression fracture or subluxation of the cervical   spine. 2. Advanced multilevel degenerative disc and degenerative joint disease. XR RADIUS ULNA RIGHT (2 VIEWS)   Final Result   Acute distal right radius and ulna fractures. ------------------------- NURSING NOTES AND VITALS REVIEWED ---------------------------  Date / Time Roomed:  8/16/2022  8:10 AM  ED Bed Assignment:  KIRBY/KIRBY    The nursing notes within the ED encounter and vital signs as below have been reviewed.    BP (!) 111/42   Pulse 71   Temp 97.9 °F (36.6 °C)   Resp 20   Wt 191 lb (86.6 kg)   LMP  (LMP Unknown)   SpO2 96%   BMI 31.78 kg/m²   Oxygen Saturation Interpretation: Normal      ------------------------------------------ PROGRESS NOTES ------------------------------------------  I have spoken with the patient and discussed todays results, in addition to providing specific details for the plan of care and counseling regarding the diagnosis and prognosis. Their questions are answered at this time and they are agreeable with the plan. I discussed at length with them reasons for immediate return here for re evaluation. They will followup with primary care by calling their office tomorrow. --------------------------------- ADDITIONAL PROVIDER NOTES ---------------------------------  At this time the patient is without objective evidence of an acute process requiring hospitalization or inpatient management. They have remained hemodynamically stable throughout their entire ED visit and are stable for discharge with outpatient follow-up. The plan has been discussed in detail and they are aware of the specific conditions for emergent return, as well as the importance of follow-up. Discharge Medication List as of 8/16/2022  2:53 PM        START taking these medications    Details   HYDROcodone-acetaminophen (NORCO) 5-325 MG per tablet Take 1 tablet by mouth every 6 hours as needed for Pain for up to 5 days. , Disp-12 tablet, R-0Print             Diagnosis:  1. Wrist fracture, bilateral, closed, initial encounter        Disposition:  Patient's disposition: Discharge to home  Patient's condition is stable.          Darrius Sanford DO  08/16/22 0849

## 2022-08-16 NOTE — CONSULTS
Department of Orthopedic Trauma Surgery  Resident consult note      CHIEF COMPLAINT:   Chief Complaint   Patient presents with    Fall     Tripped at Dr office this am- head injury and right wrist pain and deformity denies loc       HISTORY OF PRESENT ILLNESS:                Patient is a 80 y.o. female who presents with right wrist pain after a fall today. Patient has a history of dementia and is a poor historian. She had a mechanical fall from standing height while in a parking lot today. She did hit her head but denies LOC. She is left-hand dominant. She states she had previous surgery over her right wrist but does not know exactly what for. Denies numbness/tingling/paresthesias of her right wrist.  Denies any other orthopedic complaints at this time. Past Medical History:        Diagnosis Date    Asthma     Hernia     Hypertension      Past Surgical History:        Procedure Laterality Date    BLADDER SURGERY      BREAST SURGERY Left     benign    DILATION AND CURETTAGE OF UTERUS      FRACTURE SURGERY      right arm    HYSTERECTOMY (CERVIX STATUS UNKNOWN)      KNEE CARTILAGE SURGERY       Current Medications:   Current Facility-Administered Medications: 0.9 % sodium chloride bolus, 1,000 mL, IntraVENous, Once  propofol injection 87 mg, 1 mg/kg, IntraVENous, Once  Allergies:  Patient has no known allergies. Social History:   TOBACCO:   reports that she quit smoking about 42 years ago. Her smoking use included cigarettes. She has a 8.00 pack-year smoking history. She has never used smokeless tobacco.  ETOH:   reports no history of alcohol use. DRUGS:   reports no history of drug use.   ACTIVITIES OF DAILY LIVING:    OCCUPATION:    Family History:       Problem Relation Age of Onset    Lung Cancer Father     Kidney Cancer Sister     Kidney Cancer Brother        REVIEW OF SYSTEMS:   Skin: no abnormal pigmentation, rash  Eyes: no blurring or eye pain   Ears/Nose/Throat: no hearing loss, tinnitus  Respiratory: No increased work of breathing, no coughing  Cardiovascular: Brisk capillary refill bilaterally, well perfused extremities  Gastrointestinal: no nausea, vomiting  Neurologic: no paralysis, or seizures  MUSCULOSKELETAL:  positive for  bone pain      PHYSICAL EXAM:    VITALS:  BP (!) 123/47   Pulse 81   Temp 98.1 °F (36.7 °C) (Oral)   Resp 20   Wt 191 lb (86.6 kg)   LMP  (LMP Unknown)   SpO2 95%   BMI 31.78 kg/m²   Constitutional: Oriented to person, place, and time; Answer questions appropriately  HENT: Head: Normocephalic and atraumatic. Eyes: EOMI, MARJORIE  Neck: Supple, trachea midline  Cardiovascular: Brisk capillary refill to all extremities, extremities well perfused  Pulmonary/Chest: No increased work of breathing, no cough  Abdominal: Non-tender, non-distended  Neurologic:  Awake, alert and oriented in three planes. No gross deficits   MUSCULOSKELETAL:  Right upper Extremity:  Obvious deformity of the right wrist  Small abrasion over the dorsal ulnar aspect of her wrist  Dorsal wrist and hand are ecchymotic and edematous  +TTP about the right wrist and hand  compartments soft and compressible  +AIN/PIN/Ulnar nerve function intact grossly  +Radial pulse, Brisk Cap refill, hand warm and perfused  Sensation intact to touch in radial/ulnar/median nerve distributions to hand with no diminishing    Secondary Exam:   leftUE: No obvious signs of trauma. -TTP to fingers, hand, wrist, forearm, elbow, humerus, shoulder or clavicle. -- Patient able to flex/extend fingers, wrist, elbow and shoulder with active and passive ROM without pain, +2/4 Radial pulse, cap refill <3sec, +AIN/PIN/Radial/Ulnar/Median N, distal sensation grossly intact to C4-T1 dermatomes, compartments soft and compressible. bilateralLE: No obvious signs of trauma.    -TTP to foot, ankle, leg, knee, thigh, hip.-- Patient able to flex/extend toes, ankle, knee and hip with active and passive ROM without pain,+2/4 DP & PT

## 2022-08-16 NOTE — CARE COORDINATION
SS Note: SW received call from [facility] who covers [facility] office. She noted pt fell there today and reportedly has no family @ home- no supports. Although she drove herself to the appt, her niece and nephew who live in [place] have concern about pt returning home. Irais Dalton- office staff @ [facility] spoke to niece and nephew and they expressed their concerns to her. Galina Loja asking if pt can be assessed by this SW for needs. She did provide Irais Dalton w/information for [facility] [address] which Irais Dalton is to give to niece and nephew. SW also noted can give them Care patrol and provided this information. 200  SW met w/pt in ED 09 for transition of care planning. SW spoke from door wearing mask/PPE and explained role. Pt is residing alone and PTA is independent and drives. PCP is Deedee Jean Baptiste. Pt is concerned about getting her car. Pt plans on returning home. Dr. John Paige came into room and is to perform reduction of pt's R wrist. She sustained Acute distal right radius and ulna fractures. He noted pt should not be driving d/t sedation he is going to give her. Pt reports she can call her cousin Kriss Russo and she and Malissa's sister Monserrat Ariana can hopefully take her to get her car and bring her home. She cannot recall their # and SW to call her nephew and/or niece who may have it. Pt dose want to go home. Shen Perea called pt's nephew Dania Isaac. He noted his wife is Dr. Nina Torrez and they are both in [place]. He is not aware of who Kriss Russo is or her sister and they do not have any phone #'s for any family here in [place]; as pt really has none. He is willing to fly here if needed and DANY notes that is probably not needed. He will call pt later to see how she is doing. He wants to be sure pt is ok. DANY updated him on POC and resources to give pt.     1340  SW spoke to pt. She has cast on R arm now and is doing well. She c/o no pain. She cannot recall phone # for Kriss Russo or Malissa's sister Monserrat Ariana.  Their #'s are @ her house. SW tired to look them up on goggle but was unsuccessful. Pt notes she lives @ [address] [facility], [place] apt [place]. There is elevator to get to 2nd floor. Her dk blue [facility] JENELLE remains @ [facility]. She notes she just needs to get home so she can get Dossie Even # to call them- so she can get her car.     1400  SW called [facility] & spoke to Qamar Dudley. She noted pt's car can remain there till she can get it. Odd thing is pt did not have an appointment there today so not sure why she showed up. She does not have any contact info for Jennifer Dorsey or Bulmaro Braun. She also noted Pt's PCP Dr. Simona Meigs is in [place] office. SW updated pt on this informartion. She noted she went to [facility] office to get business card for Dr. Donell Wills. She saw him yesterday (Which is verified in chart) but she did not get his #. She has f/u appt in 6 weeks with him. SW talked about supports pt may need at home. She states she will be fine. She is Left handed and can manage. Hong offered the following information for: [facility] [address] [place] [address] [phone]. Again pt really did not want this but Sw placed it in her her bag just in case. [address] called [place] [employer] and set up transport for pt. They are busy and it may be a while. 1555  Pt still sitting on bench outside of ED waiting for cab. SW provided her some water. 1605  Pt was picked-up by Lucero Hernandez gave SW voucher copy.   Electronically signed by ISABEL Albrecht on 8/16/2022 at 6:34 PM

## 2022-08-18 ENCOUNTER — TELEPHONE (OUTPATIENT)
Dept: ADMINISTRATIVE | Age: 82
End: 2022-08-18

## 2022-08-18 NOTE — TELEPHONE ENCOUNTER
Please advise scheduling patient for  8/16 ED f/u for right arm. States she has swelling and would like to relieve the swelling.

## 2022-08-20 ENCOUNTER — APPOINTMENT (OUTPATIENT)
Dept: GENERAL RADIOLOGY | Age: 82
End: 2022-08-20
Payer: MEDICARE

## 2022-08-20 ENCOUNTER — HOSPITAL ENCOUNTER (EMERGENCY)
Age: 82
Discharge: HOME | End: 2022-08-20
Attending: EMERGENCY MEDICINE
Payer: MEDICARE

## 2022-08-20 VITALS
RESPIRATION RATE: 16 BRPM | DIASTOLIC BLOOD PRESSURE: 52 MMHG | OXYGEN SATURATION: 97 % | SYSTOLIC BLOOD PRESSURE: 109 MMHG | TEMPERATURE: 98.1 F | HEART RATE: 94 BPM

## 2022-08-20 DIAGNOSIS — S52.501D CLOSED FRACTURE OF DISTAL END OF RIGHT RADIUS WITH ROUTINE HEALING, UNSPECIFIED FRACTURE MORPHOLOGY, SUBSEQUENT ENCOUNTER: ICD-10-CM

## 2022-08-20 DIAGNOSIS — R07.81 RIB PAIN ON RIGHT SIDE: Primary | ICD-10-CM

## 2022-08-20 PROCEDURE — 99283 EMERGENCY DEPT VISIT LOW MDM: CPT

## 2022-08-20 PROCEDURE — 73110 X-RAY EXAM OF WRIST: CPT

## 2022-08-20 PROCEDURE — 71046 X-RAY EXAM CHEST 2 VIEWS: CPT

## 2022-08-20 ASSESSMENT — PAIN SCALES - GENERAL: PAINLEVEL_OUTOF10: 10

## 2022-08-20 ASSESSMENT — PAIN - FUNCTIONAL ASSESSMENT: PAIN_FUNCTIONAL_ASSESSMENT: NONE - DENIES PAIN

## 2022-08-20 NOTE — ED PROVIDER NOTES
smokeless tobacco. She reports that she does not drink alcohol and does not use drugs. Family History: family history includes Kidney Cancer in her brother and sister; Alissa Dubin in her father. . Unless otherwise noted, family history is non contributory    The patients home medications have been reviewed. Allergies: Patient has no known allergies. I have reviewed the past medical history, past surgical history, social history, and family history    ---------------------------------------------------PHYSICAL EXAM--------------------------------------    Constitutional/General: Alert and oriented x3  Head: Normocephalic and atraumatic  Eyes: PERRL, EOMI, sclera non icteric  ENT: Oropharynx clear, handling secretions, no trismus, no asymmetry of the posterior oropharynx or uvular edema  Neck: Supple, full ROM, no stridor, no meningeal signs  Respiratory: Lungs clear to auscultation bilaterally, no wheezes, rales, or rhonchi. Not in respiratory distress  Cardiovascular:  Regular rate. Regular rhythm. No murmurs, no gallops, no rubs. 2+ distal pulses. Equal extremity pulses. Chest: Tenderness to palpation over right mid axillary line  Gastrointestinal:  Abdomen Soft, Non tender, Non distended. No rebound, guarding, or rigidity. No pulsatile masses. Musculoskeletal: Moves all extremities x 4. Warm and well perfused, no clubbing, no cyanosis, no edema. Capillary refill <3 seconds  Right wrist in a sugar-tong splint  Ecchymotic digits of right hand  Sensation intact right digits  Cap refill less than 3 seconds  Motor intact to right digits  Skin: skin warm and dry. No rashes. Neurologic: GCS 15, no focal deficits, symmetric strength 5/5 in the upper and lower extremities bilaterally  Psychiatric: Normal Affect          -------------------------------------------------- RESULTS -------------------------------------------------  I have personally reviewed all laboratory and imaging results for this patient. Results are listed below. LABS: (Lab results interpreted by me)  No results found for this visit on 08/20/22.,       RADIOLOGY:  Interpreted by Radiologist unless otherwise specified  XR WRIST RIGHT (MIN 3 VIEWS)   Final Result   Comminuted intra-articular distal radial fracture with slight palmar   displacement, similar to post reduction images of 08/16/2022. No significant   change in alignment. XR CHEST (2 VW)   Final Result   No acute process. COPD changes. ------------------------- NURSING NOTES AND VITALS REVIEWED ---------------------------   The nursing notes within the ED encounter and vital signs as below have been reviewed by myself  BP (!) 109/52   Pulse 94   Temp 98.1 °F (36.7 °C) (Oral)   Resp 16   LMP  (LMP Unknown)   SpO2 97%     Oxygen Saturation Interpretation: Normal    The patients available past medical records and past encounters were reviewed. ------------------------------ ED COURSE/MEDICAL DECISION MAKING----------------------  Medications - No data to display      I, Dr. Dodie Menard, am the primary provider of record    Medical Decision Making:   Patient complains of right rib pain that is new onset since her fall 4 days ago where she suffered a distal radius fracture on the right side. She complains of shortness of breath with his pain and increased pain with deep breathing. She is satting at 98% on room air. We will sabina-ray right wrist to observe for any osseous changes. Chest x-ray to rule out any rib fractures or pneumothorax. Oxygen Saturation Interpretation: 98 % on RA. Normal      Re-Evaluations:  ED Course as of 08/20/22 1802   Sat Aug 20, 2022   [id number]   ATTENDING PROVIDER ATTESTATION:     I have personally performed and/or participated in the history, exam, medical decision making, and procedures and agree with all pertinent clinical information unless otherwise noted.     I have also reviewed and agree with the past medical, family and social history unless otherwise noted. I have discussed this patient in detail with the resident and provided the instruction and education regarding the evidence-based evaluation and treatment of right wrist pain with bruising of the fingers. She complains of chest wall pain. History: patient recently fell and had a fracture of the right wrist.  She also states tenderness of the right ribs. She denies SOB. She has concern for bruising and edema of the fingers. She states no paresthesias of the fingers or inability to move fingers. My findings: Rashawn Ferreira is a 80 y.o. female whom is in no distress. Physical exam reveals right wrist in splint. Edema and ecchymosis noted of the fingers. She is able to move the fingers and has no numbness. Lungs CTA b/l. My plan: Symptomatic and supportive care. Will advise to keep the arm iced and elevated. Electronically signed by Cornel Young DO on 8/20/22 at 5:57 PM EDT       [JS]      ED Course User Index  [JS] Cornel Young DO       Re-examination  8/20/22   3:50 PM EDT          Vital Signs:   Vitals:    08/20/22 1423 08/20/22 1432 08/20/22 1602   BP:  (!) 166/72 (!) 109/52   Pulse: (!) 106 98 94   Resp: 18 16 16   Temp: 98.1 °F (36.7 °C)     TempSrc: Oral     SpO2: 96% 98% 97%     Card/Pulm:  Rhythm: normal rate. Heart Sounds: no murmurs, gallops, or rubs. clear to auscultation, no wheezes or rales, and unlabored breathing. Capillary Refill: normal.  Radial Pulse:  present 2+. Skin:  Dry and Warm. This patient's ED course included: a personal history and physicial examination    This patient has remained hemodynamically stable during their ED course. Counseling: The emergency provider has spoken with the patient and discussed todays results, in addition to providing specific details for the plan of care and counseling regarding the diagnosis and prognosis.   Questions are answered at this time and they are agreeable with the plan.       --------------------------------- IMPRESSION AND DISPOSITION ---------------------------------    IMPRESSION  1. Rib pain on right side New Problem   2. Closed fracture of distal end of right radius with routine healing, unspecified fracture morphology, subsequent encounter        DISPOSITION  Disposition: Discharge to home  Patient condition is stable        NOTE: This report was transcribed using voice recognition software.  Every effort was made to ensure accuracy; however, inadvertent computerized transcription errors may be present       Wilfred Mueller DO  Resident  08/20/22 2515

## 2022-08-23 ENCOUNTER — TELEPHONE (OUTPATIENT)
Dept: FAMILY MEDICINE CLINIC | Age: 82
End: 2022-08-23

## 2022-08-23 ENCOUNTER — TELEPHONE (OUTPATIENT)
Dept: ORTHOPEDIC SURGERY | Age: 82
End: 2022-08-23

## 2022-08-23 ENCOUNTER — OFFICE VISIT (OUTPATIENT)
Dept: ORTHOPEDIC SURGERY | Age: 82
End: 2022-08-23
Payer: MEDICARE

## 2022-08-23 VITALS — TEMPERATURE: 98 F | HEIGHT: 65 IN | WEIGHT: 191 LBS | BODY MASS INDEX: 31.82 KG/M2

## 2022-08-23 DIAGNOSIS — S62.101A WRIST FRACTURE, BILATERAL, CLOSED, INITIAL ENCOUNTER: ICD-10-CM

## 2022-08-23 DIAGNOSIS — S62.102A WRIST FRACTURE, BILATERAL, CLOSED, INITIAL ENCOUNTER: ICD-10-CM

## 2022-08-23 DIAGNOSIS — S52.591A OTHER CLOSED FRACTURE OF DISTAL END OF RIGHT RADIUS, INITIAL ENCOUNTER: Primary | ICD-10-CM

## 2022-08-23 PROCEDURE — 1090F PRES/ABSN URINE INCON ASSESS: CPT | Performed by: ORTHOPAEDIC SURGERY

## 2022-08-23 PROCEDURE — 1123F ACP DISCUSS/DSCN MKR DOCD: CPT | Performed by: ORTHOPAEDIC SURGERY

## 2022-08-23 PROCEDURE — G8399 PT W/DXA RESULTS DOCUMENT: HCPCS | Performed by: ORTHOPAEDIC SURGERY

## 2022-08-23 PROCEDURE — G8427 DOCREV CUR MEDS BY ELIG CLIN: HCPCS | Performed by: ORTHOPAEDIC SURGERY

## 2022-08-23 PROCEDURE — G8417 CALC BMI ABV UP PARAM F/U: HCPCS | Performed by: ORTHOPAEDIC SURGERY

## 2022-08-23 PROCEDURE — 99214 OFFICE O/P EST MOD 30 MIN: CPT | Performed by: ORTHOPAEDIC SURGERY

## 2022-08-23 PROCEDURE — 1036F TOBACCO NON-USER: CPT | Performed by: ORTHOPAEDIC SURGERY

## 2022-08-23 RX ORDER — HYDROCODONE BITARTRATE AND ACETAMINOPHEN 5; 325 MG/1; MG/1
1 TABLET ORAL EVERY 6 HOURS PRN
Qty: 28 TABLET | Refills: 0 | Status: ON HOLD
Start: 2022-08-23 | End: 2022-08-29 | Stop reason: HOSPADM

## 2022-08-23 NOTE — TELEPHONE ENCOUNTER
Spoke with Dr. Lelia Kwon office patient does need surgical clearance. I left a message for patient to call the office to be scheduled with Kyaw Vyas on 8/24/ or 8/26.

## 2022-08-23 NOTE — PROGRESS NOTES
Chief Complaint   Patient presents with    Wrist Pain     Right Wrist had a fall going down the stairs DOI 08/16/2022       Louise Mcguire is a 80y.o. year old  female who presents for evaluation of right wrist pain. she reports this started 8/20/22. she does remember a specific injury that started the pain. She fell. The injury was direct trauma, fall. The pain is located mainly over distal radius. The pain is worse with movcement and better with rest.  The patient has tried OTC analgesics, ice. The treatment has not been effective. The patient is left dominant. She was reduced in splint. .    Past Medical History:   Diagnosis Date    Asthma     Hernia     Hypertension      Past Surgical History:   Procedure Laterality Date    BLADDER SURGERY      BREAST SURGERY Left     benign    DILATION AND CURETTAGE OF UTERUS      FRACTURE SURGERY      right arm    HYSTERECTOMY (CERVIX STATUS UNKNOWN)      KNEE CARTILAGE SURGERY         Current Outpatient Medications:     Multiple Vitamins-Minerals (THERAPEUTIC MULTIVITAMIN-MINERALS) tablet, Take 1 tablet by mouth in the morning., Disp: , Rfl:     COD LIVER OIL PO, Take by mouth daily, Disp: , Rfl:     atorvastatin (LIPITOR) 10 MG tablet, Take 1 tablet by mouth in the morning., Disp: 90 tablet, Rfl: 0    lisinopril-hydroCHLOROthiazide (PRINZIDE;ZESTORETIC) 10-12.5 MG per tablet, Take 1 tablet by mouth in the morning., Disp: 90 tablet, Rfl: 1    albuterol sulfate  (90 BASE) MCG/ACT inhaler, Inhale 2 puffs into the lungs every 6 hours as needed for Wheezing, Disp: , Rfl:     vitamin B-12 (CYANOCOBALAMIN) 1000 MCG tablet, Take 1,000 mcg by mouth once a week 2x/weekly, Disp: , Rfl:     aspirin 81 MG chewable tablet, Take 81 mg by mouth daily, Disp: , Rfl:     Cholecalciferol (VITAMIN D3) 400 UNIT/ML LIQD, Take 5 drops by mouth daily, Disp: , Rfl:     COCONUT OIL PO, Take by mouth, Disp: , Rfl:   No Known Allergies  Social History     Socioeconomic History Marital status: Single     Spouse name: Not on file    Number of children: Not on file    Years of education: Not on file    Highest education level: Not on file   Occupational History    Not on file   Tobacco Use    Smoking status: Former     Packs/day: 0.40     Years: 20.00     Pack years: 8.00     Types: Cigarettes     Quit date: 36     Years since quittin.6    Smokeless tobacco: Never   Substance and Sexual Activity    Alcohol use: No     Alcohol/week: 0.0 standard drinks    Drug use: No    Sexual activity: Not on file   Other Topics Concern    Not on file   Social History Narrative    Not on file     Social Determinants of Health     Financial Resource Strain: Low Risk     Difficulty of Paying Living Expenses: Not hard at all   Food Insecurity: No Food Insecurity    Worried About Running Out of Food in the Last Year: Never true    Ran Out of Food in the Last Year: Never true   Transportation Needs: Not on file   Physical Activity: Not on file   Stress: Not on file   Social Connections: Not on file   Intimate Partner Violence: Not on file   Housing Stability: Not on file     Family History   Problem Relation Age of Onset    Lung Cancer Father     Kidney Cancer Sister     Kidney Cancer Brother        REVIEW OF SYSTEMS:     General/Constitution:  (-)weight loss, (-)fever, (-)chills, (-)weakness. Skin: (-) rash,(-) psoriasis,(-) eczema, (-)skin cancer. Musculoskeletal: (-) fractures,  (-) dislocations,(-) collagen vascular disease, (-) fibromyalgia, (-) multiple sclerosis, (-) muscular dystrophy, (-) RSD,(-) joint pain (-)swelling, (-) joint pain,swelling. Neurologic: (-) epilepsy, (-)seizures,(-) brain tumor,(-) TIA, (-)stroke, (-)headaches, (-)Parkinson disease,(-) memory loss, (-) LOC. Cardiovascular: (-) Chest pain, (-) swelling in legs/feet, (-) SOB, (-) cramping in legs/feet with walking. Respiratory: (-) SOB, (-) Coughing, (-) night sweats.   GI: (-) nausea, (-) vomiting, (-) diarrhea, (-) blood in stool, (-) gastric ulcer. Psychiatric: (-) Depression, (-) Anxiety, (-) bipolar disease, (-) Alzheimer's Disease  Allergic/Immunologic: (-) allergies latex, (-) allergies metal, (-) skin sensitivity. Hematlogic: (-) anemia, (-) blood transfusion, (-) DVT/PE, (-) Clotting disorders      Subjective:  _Temp 98 °F (36.7 °C)   Ht 5' 5\" (1.651 m)   Wt 191 lb (86.6 kg)   LMP  (LMP Unknown)   BMI 31.78 kg/m²  Vital signs are stable. In general, patient is awake, alert and oriented X3, in no apparent distress. Examination of HENT reveals normocephalic, atraumatic. PERRLA/EOMI sclera are white. Conjunctivae are clear. TM's are intact. Pharynx is pink and moist.  Uvula and tongue are midline. Heart: Positive S1 and positive S2 with regular rate and rhythm. Lungs: Clear to auscultation bilaterally without rales, rhonchi or wheezes. Abdomen: soft, nontender. Positive bowel sounds. No organomegaly. No guarding or rigidity. Constitution:  Temp 98 °F (36.7 °C)   Ht 5' 5\" (1.651 m)   Wt 191 lb (86.6 kg)   LMP  (LMP Unknown)   BMI 31.78 kg/m²     Psycihatric:  The patient is alert and oriented x 3, appears to be stated age and in no distress. Respiratory:  Respiratory effort is not labored. Patient is not gasping. Palpation of the chest reveals no tactile fremitus. Skin:  Upon inspection: the skin appears warm, dry and intact. There is not a previous scar over the affected area. There is not any cellulitis, lymphedema or cutaneous lesions noted in the lower extremities. Upon palpation there is no induration noted. Neurologic:  Motor exam of the upper extremities show: The reflexes in biceps/triceps/brachioradialis are equal and symmetric. Sensory exam C5-T1 are normal bilaterally. Cardiovascular: The vascular exam is normal and is well perfused to distal extremities. There are 2+ radial pulses bilaterally, and motor and sensation is intact to median, ulnar, and radial, musclocutaneus, and axillary nerve distribution and grossly symmetric bilaterally. There is cap refill noted less than two seconds in all digits. There is not edema of the bilateral upper extremities. There is not varicosities noted in the distal extremities. Lymph:  Upon palpation,  there is no lymphadenopathy noted in bilateral upper extremities. Musculoskeletal:  Gait: normal; examination of the nails and digits reveal no cyanosis or clubbing. Cervical Exam:  On physical exam, Bebo August is well-developed, well-nourished, oriented to person, place and time. her gait is normal.  On evaluation of her cervical spine, she has full range of motion of the cervical spine without pain. There is no cervical tenderness to palpation. Shoulder Exam:  On evaluation of her bilaterally upper extremities, her bilateral shoulder has no deformity. There is not evidence of scapular dyskinesis. There is not muscle atrophy in shoulder girdle. The range of motion for the Right Shoulder is 15/50/t8 and for the Left shoulder is 150/50/t8. Right shoulder Motor strength is 5/5 in the supraspinatus, 5/5 internal rotation and 5/5 in external rotation, and Left shoulder motor strength 5/5 in supraspinatus, 5/5 in internal rotation, 5/5 in external rotation. Elbow exam:  Evaluation of the elbow, reveals no signs of swelling or deformity. ROM is 0-140. There is not instability with varus/valgus stresses. Motor strength is 5/5 with flexion/extension. Wrist exam:  Splint intact  Arom fingres  + epl  Sensation intact  Bcr <3 seconds    Right:  Phallens sign(-), Tinnells sign (-), Median nerve compression test (-),  Finklesteins (-), [employer] Grind test (-), [employer](-). Left:    Phallens sign(-), Tinnells sign (-), Median nerve compression test (-),  Finklesteins (-), [employer] Grind test (-), [employer](-).      Xrays:   Comminuted intra-articular distal radial fracture with slight palmar   displacement, similar to post reduction images of 08/16/2022. No significant   change in alignment. Radiographic findings reviewed with patient    Impression:   Encounter Diagnosis   Name Primary? Other closed fracture of distal end of right radius, initial encounter Yes       Plan: Natural history and expected course discussed. Questions answered. Educational materials distributed. Rest, ice, compression, and elevation (RICE) therapy. I discussed the risks and benefits of the procedure with the patient. The risks include but are not limited to: infection, injuries to blood vessels and nerves, non relief of symptoms, need for further operative intervention, blood loss,  DVT/PE, MI and death. The patient understands these risks and wishes to proceed with surgery. I will perform an ORIF vs external fication of the right distal radius on 8/29/22. At least 30 minutes was spent discussing the diagnosis and treatment options with the patient with at least 50% of the time was spent with decision making and counseling the patient. The patient was counseled at length about the risks of asael Covid-19 during their perioperative period and any recovery window from their procedure. The patient was made aware that asael Covid-19  may worsen their prognosis for recovering from their procedure  and lend to a higher morbidity and/or mortality risk. All material risks, benefits, and reasonable alternatives including postponing the procedure were discussed. The patient does wish to proceed with the procedure at this time.

## 2022-08-24 ENCOUNTER — OFFICE VISIT (OUTPATIENT)
Dept: FAMILY MEDICINE CLINIC | Age: 82
End: 2022-08-24
Payer: MEDICARE

## 2022-08-24 VITALS
BODY MASS INDEX: 30.26 KG/M2 | HEIGHT: 65 IN | SYSTOLIC BLOOD PRESSURE: 100 MMHG | RESPIRATION RATE: 16 BRPM | OXYGEN SATURATION: 96 % | TEMPERATURE: 97.3 F | DIASTOLIC BLOOD PRESSURE: 58 MMHG | WEIGHT: 181.6 LBS | HEART RATE: 91 BPM

## 2022-08-24 DIAGNOSIS — K59.00 CONSTIPATION, UNSPECIFIED CONSTIPATION TYPE: ICD-10-CM

## 2022-08-24 DIAGNOSIS — Z01.818 PRE-OP EXAM: Primary | ICD-10-CM

## 2022-08-24 DIAGNOSIS — S52.501D CLOSED FRACTURE OF DISTAL END OF RIGHT RADIUS WITH ROUTINE HEALING, UNSPECIFIED FRACTURE MORPHOLOGY, SUBSEQUENT ENCOUNTER: ICD-10-CM

## 2022-08-24 PROCEDURE — G8427 DOCREV CUR MEDS BY ELIG CLIN: HCPCS | Performed by: NURSE PRACTITIONER

## 2022-08-24 PROCEDURE — 93000 ELECTROCARDIOGRAM COMPLETE: CPT | Performed by: NURSE PRACTITIONER

## 2022-08-24 PROCEDURE — 1036F TOBACCO NON-USER: CPT | Performed by: NURSE PRACTITIONER

## 2022-08-24 PROCEDURE — G8417 CALC BMI ABV UP PARAM F/U: HCPCS | Performed by: NURSE PRACTITIONER

## 2022-08-24 PROCEDURE — 99213 OFFICE O/P EST LOW 20 MIN: CPT | Performed by: NURSE PRACTITIONER

## 2022-08-24 PROCEDURE — G8399 PT W/DXA RESULTS DOCUMENT: HCPCS | Performed by: NURSE PRACTITIONER

## 2022-08-24 PROCEDURE — 1123F ACP DISCUSS/DSCN MKR DOCD: CPT | Performed by: NURSE PRACTITIONER

## 2022-08-24 PROCEDURE — 1090F PRES/ABSN URINE INCON ASSESS: CPT | Performed by: NURSE PRACTITIONER

## 2022-08-24 RX ORDER — DOCUSATE SODIUM 100 MG/1
100 CAPSULE, LIQUID FILLED ORAL DAILY PRN
Qty: 30 CAPSULE | Refills: 0 | Status: SHIPPED | OUTPATIENT
Start: 2022-08-24

## 2022-08-24 ASSESSMENT — ENCOUNTER SYMPTOMS
SHORTNESS OF BREATH: 0
VOMITING: 0
NAUSEA: 0
CONSTIPATION: 1
DIARRHEA: 0
COUGH: 0
WHEEZING: 0

## 2022-08-24 NOTE — PROGRESS NOTES
8/24/2022 I had Satora check with Dr Dave Coker for the pts lab work and ekg since the labs were done 8/11.  He is ok with using them for surgery as the were all ok

## 2022-08-24 NOTE — PROGRESS NOTES
Coco Johns (:  1940) is a 80 y.o. female,Established patient, here for evaluation of the following chief complaint(s):  Pre-op Exam (Would like to discuss fiber foods to eat)         ASSESSMENT/PLAN:  1. Pre-op exam  -     EKG 12 lead; Future    2. Constipation, unspecified constipation type  -     docusate sodium (COLACE) 100 MG capsule; Take 1 capsule by mouth daily as needed for Constipation, Disp-30 capsule, R-0Normal  Glycerin suppository prn    3. Closed fracture of distal end of right radius with routine healing, unspecified fracture morphology, subsequent encounter  To surgery on     No follow-ups on file. Subjective   SUBJECTIVE/OBJECTIVE:  Pre op exam.  Patient is having surgery for a distal radial fracture  with Dr Rodriguez Loo. She took last dose of norco last night. Requesting refill      Review of Systems   Constitutional:  Positive for appetite change (decreased) and fatigue. Negative for activity change and unexpected weight change. Respiratory:  Negative for cough, shortness of breath and wheezing. Cardiovascular:  Negative for chest pain and palpitations. Gastrointestinal:  Positive for constipation. Negative for diarrhea, nausea and vomiting. Musculoskeletal:  Positive for arthralgias. Neurological:  Negative for weakness, light-headedness, numbness and headaches. Psychiatric/Behavioral:  Positive for sleep disturbance (dificulty staying asleep). Negative for dysphoric mood. The patient is nervous/anxious. Objective   BP (!) 100/58 (Site: Left Upper Arm, Position: Sitting, Cuff Size: Large Adult)   Pulse 91   Temp 97.3 °F (36.3 °C) (Temporal)   Resp 16   Ht 5' 5\" (1.651 m)   Wt 181 lb 9.6 oz (82.4 kg)   LMP  (LMP Unknown)   SpO2 96%   BMI 30.22 kg/m²    Physical Exam  Constitutional:       General: She is not in acute distress. Appearance: Normal appearance. She is well-developed. HENT:      Head: Normocephalic and atraumatic.    Neck: Thyroid: No thyromegaly. Vascular: No carotid bruit. Trachea: No tracheal deviation. Cardiovascular:      Rate and Rhythm: Normal rate and regular rhythm. Heart sounds: Normal heart sounds. No murmur heard. Pulmonary:      Effort: Pulmonary effort is normal. No respiratory distress. Breath sounds: Normal breath sounds. No wheezing, rhonchi or rales. Chest:      Chest wall: No tenderness. Abdominal:      General: Bowel sounds are normal.      Palpations: Abdomen is soft. Tenderness: There is no abdominal tenderness. Musculoskeletal:      Comments: Right wrist/forearm cast. Fingers are ecchymotic, warm to touch, cap ref < 3 sec   Lymphadenopathy:      Cervical: No cervical adenopathy. Skin:     General: Skin is warm and dry. Neurological:      Mental Status: She is alert and oriented to person, place, and time. Psychiatric:         Mood and Affect: Mood normal.         Behavior: Behavior normal.      Medically cleared for open reduction internal fixation right radial wrist fracture    MDM low      An electronic signature was used to authenticate this note.     --Crispin Skiff, MARVIN - CNP

## 2022-08-26 NOTE — PROGRESS NOTES
[address] [facility]                                                                                                                    PRE OP INSTRUCTIONS FOR  Neva Norman        Date: 8/26/2022    Date of surgery: 8/29   Arrival Time: Hospital will call you between 5pm and 7pm with your final arrival time for surgery    Nothing by mouth (NPO) as instructed.____________________________________________________________________    Take the following medications with a small sip of water on the morning of Surgery: pain pill prn, bring rescue inhaler. Diabetics may take evening dose of insulin but none after midnight. If you feel symptomatic or low blood sugar morning of surgery drink 1-2 ounces of apple juice only. Aspirin, Ibuprofen, Advil, Naproxen, Vitamin E and other Anti-inflammatory products should be stopped  before surgery  as directed by your physician. Take Tylenol only unless instructed otherwise by your surgeon. Check with your Doctor regarding stopping Plavix, Coumadin, Lovenox, Eliquis, Effient, or other blood thinners. Do not smoke,use illicit drugs and do not drink any alcoholic beverages 24 hours prior to surgery. You may brush your teeth the morning of surgery. DO NOT SWALLOW WATER    You MUST make arrangements for a responsible adult to take you home after your surgery. You will not be allowed to leave alone or drive yourself home. It is strongly suggested someone stay with you the first 24 hrs. Your surgery will be cancelled if you do not have a ride home. PEDIATRIC PATIENTS ONLY:  A parent/legal guardian must accompany a child scheduled for surgery and plan to stay at the hospital until the child is discharged. Please do not bring other children with you.     Please wear simple, loose fitting clothing to the hospital.  Jackelyn Skeans not bring valuables (money, credit cards, checkbooks, etc.) Do not wear any makeup (including no eye makeup) or nail polish on your fingers or toes. DO NOT wear any jewelry or piercings on day of surgery. All body piercing jewelry must be removed. Shower the night before surgery with __x_Antibacterial soap /CASH WIPES________May use deodorant. No creams lotions or powders from the neck down. TOTAL JOINT REPLACEMENT/HYSTERECTOMY PATIENTS ONLY---Remember to bring Blood Bank bracelet to the hospital on the day of surgery. If you have a Living Will and Durable Power of  for Healthcare, please bring in a copy. If appropriate bring crutches, inspirex, WALKER, CANE etc... Notify your Surgeon if you develop any illness between now and surgery time, cough, cold, fever, sore throat, nausea, vomiting, etc.  Please notify your surgeon if you experience dizziness, shortness of breath or blurred vision between now & the time of your surgery. If you have ___dentures, they will be removed before going to the OR; we will provide you a container. If you wear ___contact lenses or ___glasses, they will be removed; please bring a case for them. To provide excellent care visitors will be limited to 2 in the room at any given time. Please bring picture ID and insurance card. During flu season no children under the age of 15 are permitted in the hospital for the safety of all patients. Other please check in at the information desk/main lobby. Please wear a mask. Please call AMBULATORY CARE if you have any further questions.    [address]     [address]

## 2022-08-28 ENCOUNTER — ANESTHESIA EVENT (OUTPATIENT)
Dept: OPERATING ROOM | Age: 82
End: 2022-08-28
Payer: MEDICARE

## 2022-08-29 ENCOUNTER — HOSPITAL ENCOUNTER (OUTPATIENT)
Age: 82
Setting detail: OBSERVATION
Discharge: NURSING, REHAB OR LONG-TERM CARE | End: 2022-08-30
Attending: ORTHOPAEDIC SURGERY | Admitting: ORTHOPAEDIC SURGERY
Payer: MEDICARE

## 2022-08-29 ENCOUNTER — ANESTHESIA (OUTPATIENT)
Dept: OPERATING ROOM | Age: 82
End: 2022-08-29
Payer: MEDICARE

## 2022-08-29 ENCOUNTER — APPOINTMENT (OUTPATIENT)
Dept: GENERAL RADIOLOGY | Age: 82
End: 2022-08-29
Attending: ORTHOPAEDIC SURGERY
Payer: MEDICARE

## 2022-08-29 DIAGNOSIS — S62.101A CLOSED FRACTURE OF RIGHT WRIST, INITIAL ENCOUNTER: ICD-10-CM

## 2022-08-29 PROBLEM — S52.501A TRAUMATIC CLOSED DISPLACED FRACTURE OF DISTAL END OF RADIUS, RIGHT, INITIAL ENCOUNTER: Status: ACTIVE | Noted: 2022-08-29

## 2022-08-29 PROBLEM — Z98.890 POST-OPERATIVE STATE: Status: ACTIVE | Noted: 2022-08-29

## 2022-08-29 PROCEDURE — 7100000000 HC PACU RECOVERY - FIRST 15 MIN: Performed by: ORTHOPAEDIC SURGERY

## 2022-08-29 PROCEDURE — 6360000002 HC RX W HCPCS: Performed by: ANESTHESIOLOGY

## 2022-08-29 PROCEDURE — 2720000010 HC SURG SUPPLY STERILE: Performed by: ORTHOPAEDIC SURGERY

## 2022-08-29 PROCEDURE — 6370000000 HC RX 637 (ALT 250 FOR IP): Performed by: ANESTHESIOLOGY

## 2022-08-29 PROCEDURE — 2580000003 HC RX 258: Performed by: NURSE PRACTITIONER

## 2022-08-29 PROCEDURE — 6360000002 HC RX W HCPCS

## 2022-08-29 PROCEDURE — C1713 ANCHOR/SCREW BN/BN,TIS/BN: HCPCS | Performed by: ORTHOPAEDIC SURGERY

## 2022-08-29 PROCEDURE — 7100000001 HC PACU RECOVERY - ADDTL 15 MIN: Performed by: ORTHOPAEDIC SURGERY

## 2022-08-29 PROCEDURE — 6370000000 HC RX 637 (ALT 250 FOR IP): Performed by: FAMILY MEDICINE

## 2022-08-29 PROCEDURE — 2709999900 HC NON-CHARGEABLE SUPPLY: Performed by: ORTHOPAEDIC SURGERY

## 2022-08-29 PROCEDURE — 3209999900 FLUORO FOR SURGICAL PROCEDURES

## 2022-08-29 PROCEDURE — 3700000000 HC ANESTHESIA ATTENDED CARE: Performed by: ORTHOPAEDIC SURGERY

## 2022-08-29 PROCEDURE — 7100000011 HC PHASE II RECOVERY - ADDTL 15 MIN: Performed by: ORTHOPAEDIC SURGERY

## 2022-08-29 PROCEDURE — 2580000003 HC RX 258: Performed by: ANESTHESIOLOGY

## 2022-08-29 PROCEDURE — 3600000004 HC SURGERY LEVEL 4 BASE: Performed by: ORTHOPAEDIC SURGERY

## 2022-08-29 PROCEDURE — 2580000003 HC RX 258: Performed by: FAMILY MEDICINE

## 2022-08-29 PROCEDURE — G0378 HOSPITAL OBSERVATION PER HR: HCPCS

## 2022-08-29 PROCEDURE — 3600000014 HC SURGERY LEVEL 4 ADDTL 15MIN: Performed by: ORTHOPAEDIC SURGERY

## 2022-08-29 PROCEDURE — 6360000002 HC RX W HCPCS: Performed by: NURSE PRACTITIONER

## 2022-08-29 PROCEDURE — 64415 NJX AA&/STRD BRCH PLXS IMG: CPT | Performed by: ANESTHESIOLOGY

## 2022-08-29 PROCEDURE — 3700000001 HC ADD 15 MINUTES (ANESTHESIA): Performed by: ORTHOPAEDIC SURGERY

## 2022-08-29 PROCEDURE — 7100000010 HC PHASE II RECOVERY - FIRST 15 MIN: Performed by: ORTHOPAEDIC SURGERY

## 2022-08-29 PROCEDURE — 6360000002 HC RX W HCPCS: Performed by: FAMILY MEDICINE

## 2022-08-29 DEVICE — SCREW BNE L12MM DIA2.7MM CORT S STL ST T8 STARDRV RECESS: Type: IMPLANTABLE DEVICE | Site: WRIST | Status: FUNCTIONAL

## 2022-08-29 DEVICE — PLATE BNE W22XL54MM STD 6X3 H ST R DST RAD VOLAR S STL VAR: Type: IMPLANTABLE DEVICE | Site: WRIST | Status: FUNCTIONAL

## 2022-08-29 DEVICE — GRAFT BNE SUB 5ML 1.7-10MM CANC CHIP MORSELIZED FRZ DRY: Type: IMPLANTABLE DEVICE | Site: WRIST | Status: FUNCTIONAL

## 2022-08-29 DEVICE — SCREW BNE L24MM DIA2.4MM DST RAD VOLAR S STL ST VAR ANG LOK: Type: IMPLANTABLE DEVICE | Site: WRIST | Status: FUNCTIONAL

## 2022-08-29 DEVICE — SCREW BNE L18MM DIA2.4MM DST RAD VOLAR S STL ST VAR ANG LOK: Type: IMPLANTABLE DEVICE | Site: WRIST | Status: FUNCTIONAL

## 2022-08-29 DEVICE — SCREW BNE L14MM DIA2.7MM CORT S STL ST T8 STARDRV RECESS: Type: IMPLANTABLE DEVICE | Site: WRIST | Status: FUNCTIONAL

## 2022-08-29 DEVICE — SCREW BNE L24MM DIA2.4MM CORT S STL ST T8 STARDRV RECESS: Type: IMPLANTABLE DEVICE | Site: WRIST | Status: FUNCTIONAL

## 2022-08-29 DEVICE — SCREW BNE L20MM DIA2.4MM DST RAD VOLAR S STL ST VAR ANG LOK: Type: IMPLANTABLE DEVICE | Site: WRIST | Status: FUNCTIONAL

## 2022-08-29 DEVICE — SCREW BNE L22MM DIA2.4MM DST RAD VOLAR S STL ST VAR ANG LOK: Type: IMPLANTABLE DEVICE | Site: WRIST | Status: FUNCTIONAL

## 2022-08-29 RX ORDER — PROCHLORPERAZINE EDISYLATE 5 MG/ML
5 INJECTION INTRAMUSCULAR; INTRAVENOUS
Status: COMPLETED | OUTPATIENT
Start: 2022-08-29 | End: 2022-08-29

## 2022-08-29 RX ORDER — ONDANSETRON 2 MG/ML
4 INJECTION INTRAMUSCULAR; INTRAVENOUS
Status: COMPLETED | OUTPATIENT
Start: 2022-08-29 | End: 2022-08-29

## 2022-08-29 RX ORDER — ONDANSETRON 2 MG/ML
INJECTION INTRAMUSCULAR; INTRAVENOUS
Status: COMPLETED
Start: 2022-08-29 | End: 2022-08-29

## 2022-08-29 RX ORDER — DIPHENHYDRAMINE HYDROCHLORIDE 50 MG/ML
12.5 INJECTION INTRAMUSCULAR; INTRAVENOUS
Status: DISCONTINUED | OUTPATIENT
Start: 2022-08-29 | End: 2022-08-29 | Stop reason: HOSPADM

## 2022-08-29 RX ORDER — SODIUM CHLORIDE, SODIUM LACTATE, POTASSIUM CHLORIDE, CALCIUM CHLORIDE 600; 310; 30; 20 MG/100ML; MG/100ML; MG/100ML; MG/100ML
INJECTION, SOLUTION INTRAVENOUS CONTINUOUS
Status: DISCONTINUED | OUTPATIENT
Start: 2022-08-29 | End: 2022-08-29 | Stop reason: HOSPADM

## 2022-08-29 RX ORDER — ASPIRIN 81 MG/1
81 TABLET, CHEWABLE ORAL DAILY
Status: DISCONTINUED | OUTPATIENT
Start: 2022-08-29 | End: 2022-08-30 | Stop reason: HOSPADM

## 2022-08-29 RX ORDER — DOCUSATE SODIUM 100 MG/1
100 CAPSULE, LIQUID FILLED ORAL 2 TIMES DAILY PRN
Status: DISCONTINUED | OUTPATIENT
Start: 2022-08-29 | End: 2022-08-30 | Stop reason: HOSPADM

## 2022-08-29 RX ORDER — MORPHINE SULFATE 2 MG/ML
2 INJECTION, SOLUTION INTRAMUSCULAR; INTRAVENOUS EVERY 5 MIN PRN
Status: DISCONTINUED | OUTPATIENT
Start: 2022-08-29 | End: 2022-08-29 | Stop reason: HOSPADM

## 2022-08-29 RX ORDER — ROPIVACAINE HYDROCHLORIDE 5 MG/ML
INJECTION, SOLUTION EPIDURAL; INFILTRATION; PERINEURAL
Status: COMPLETED
Start: 2022-08-29 | End: 2022-08-29

## 2022-08-29 RX ORDER — SODIUM CHLORIDE 0.9 % (FLUSH) 0.9 %
5-40 SYRINGE (ML) INJECTION EVERY 12 HOURS SCHEDULED
Status: DISCONTINUED | OUTPATIENT
Start: 2022-08-29 | End: 2022-08-30 | Stop reason: HOSPADM

## 2022-08-29 RX ORDER — MIDAZOLAM HYDROCHLORIDE 1 MG/ML
2 INJECTION INTRAMUSCULAR; INTRAVENOUS
Status: DISCONTINUED | OUTPATIENT
Start: 2022-08-29 | End: 2022-08-29 | Stop reason: HOSPADM

## 2022-08-29 RX ORDER — ONDANSETRON 2 MG/ML
4 INJECTION INTRAMUSCULAR; INTRAVENOUS EVERY 6 HOURS PRN
Status: DISCONTINUED | OUTPATIENT
Start: 2022-08-29 | End: 2022-08-30 | Stop reason: HOSPADM

## 2022-08-29 RX ORDER — FENTANYL CITRATE 0.05 MG/ML
INJECTION, SOLUTION INTRAMUSCULAR; INTRAVENOUS
Status: DISCONTINUED
Start: 2022-08-29 | End: 2022-08-29 | Stop reason: WASHOUT

## 2022-08-29 RX ORDER — IPRATROPIUM BROMIDE AND ALBUTEROL SULFATE 2.5; .5 MG/3ML; MG/3ML
1 SOLUTION RESPIRATORY (INHALATION)
Status: DISCONTINUED | OUTPATIENT
Start: 2022-08-29 | End: 2022-08-29 | Stop reason: HOSPADM

## 2022-08-29 RX ORDER — LISINOPRIL AND HYDROCHLOROTHIAZIDE 12.5; 1 MG/1; MG/1
1 TABLET ORAL DAILY
Status: DISCONTINUED | OUTPATIENT
Start: 2022-08-29 | End: 2022-08-29

## 2022-08-29 RX ORDER — HYDROCODONE BITARTRATE AND ACETAMINOPHEN 5; 325 MG/1; MG/1
1 TABLET ORAL EVERY 6 HOURS PRN
Qty: 28 TABLET | Refills: 0 | Status: SHIPPED | OUTPATIENT
Start: 2022-08-29 | End: 2022-09-05

## 2022-08-29 RX ORDER — SODIUM CHLORIDE 9 MG/ML
INJECTION, SOLUTION INTRAVENOUS PRN
Status: DISCONTINUED | OUTPATIENT
Start: 2022-08-29 | End: 2022-08-30 | Stop reason: HOSPADM

## 2022-08-29 RX ORDER — ATORVASTATIN CALCIUM 10 MG/1
10 TABLET, FILM COATED ORAL DAILY
Status: DISCONTINUED | OUTPATIENT
Start: 2022-08-29 | End: 2022-08-30 | Stop reason: HOSPADM

## 2022-08-29 RX ORDER — SODIUM CHLORIDE 0.9 % (FLUSH) 0.9 %
5-40 SYRINGE (ML) INJECTION PRN
Status: DISCONTINUED | OUTPATIENT
Start: 2022-08-29 | End: 2022-08-30 | Stop reason: HOSPADM

## 2022-08-29 RX ORDER — HYDRALAZINE HYDROCHLORIDE 20 MG/ML
10 INJECTION INTRAMUSCULAR; INTRAVENOUS
Status: DISCONTINUED | OUTPATIENT
Start: 2022-08-29 | End: 2022-08-29 | Stop reason: HOSPADM

## 2022-08-29 RX ORDER — ROPIVACAINE HYDROCHLORIDE 5 MG/ML
INJECTION, SOLUTION EPIDURAL; INFILTRATION; PERINEURAL
Status: DISCONTINUED | OUTPATIENT
Start: 2022-08-29 | End: 2022-08-29 | Stop reason: SDUPTHER

## 2022-08-29 RX ORDER — CEPHALEXIN 500 MG/1
500 CAPSULE ORAL 3 TIMES DAILY
Qty: 10 CAPSULE | Refills: 0 | Status: SHIPPED | OUTPATIENT
Start: 2022-08-29 | End: 2022-09-02

## 2022-08-29 RX ORDER — HYDROCHLOROTHIAZIDE 12.5 MG/1
12.5 TABLET ORAL DAILY
Status: DISCONTINUED | OUTPATIENT
Start: 2022-08-29 | End: 2022-08-30 | Stop reason: HOSPADM

## 2022-08-29 RX ORDER — ONDANSETRON 2 MG/ML
INJECTION INTRAMUSCULAR; INTRAVENOUS PRN
Status: DISCONTINUED | OUTPATIENT
Start: 2022-08-29 | End: 2022-08-29 | Stop reason: SDUPTHER

## 2022-08-29 RX ORDER — LABETALOL HYDROCHLORIDE 5 MG/ML
10 INJECTION, SOLUTION INTRAVENOUS
Status: DISCONTINUED | OUTPATIENT
Start: 2022-08-29 | End: 2022-08-29 | Stop reason: HOSPADM

## 2022-08-29 RX ORDER — ACETAMINOPHEN 325 MG/1
650 TABLET ORAL EVERY 4 HOURS PRN
Status: DISCONTINUED | OUTPATIENT
Start: 2022-08-29 | End: 2022-08-30 | Stop reason: HOSPADM

## 2022-08-29 RX ORDER — OXYCODONE HYDROCHLORIDE 5 MG/1
10 TABLET ORAL EVERY 4 HOURS PRN
Status: DISCONTINUED | OUTPATIENT
Start: 2022-08-29 | End: 2022-08-30 | Stop reason: HOSPADM

## 2022-08-29 RX ORDER — DEXAMETHASONE SODIUM PHOSPHATE 10 MG/ML
INJECTION, SOLUTION INTRAMUSCULAR; INTRAVENOUS PRN
Status: DISCONTINUED | OUTPATIENT
Start: 2022-08-29 | End: 2022-08-29 | Stop reason: SDUPTHER

## 2022-08-29 RX ORDER — HYDROMORPHONE HCL 110MG/55ML
PATIENT CONTROLLED ANALGESIA SYRINGE INTRAVENOUS
Status: COMPLETED
Start: 2022-08-29 | End: 2022-08-29

## 2022-08-29 RX ORDER — LISINOPRIL 10 MG/1
10 TABLET ORAL DAILY
Status: DISCONTINUED | OUTPATIENT
Start: 2022-08-29 | End: 2022-08-30 | Stop reason: HOSPADM

## 2022-08-29 RX ORDER — OXYCODONE HYDROCHLORIDE 5 MG/1
5 TABLET ORAL EVERY 4 HOURS PRN
Status: DISCONTINUED | OUTPATIENT
Start: 2022-08-29 | End: 2022-08-30 | Stop reason: HOSPADM

## 2022-08-29 RX ORDER — HYDROMORPHONE HCL 110MG/55ML
PATIENT CONTROLLED ANALGESIA SYRINGE INTRAVENOUS PRN
Status: DISCONTINUED | OUTPATIENT
Start: 2022-08-29 | End: 2022-08-29 | Stop reason: SDUPTHER

## 2022-08-29 RX ORDER — FENTANYL CITRATE 50 UG/ML
INJECTION, SOLUTION INTRAMUSCULAR; INTRAVENOUS PRN
Status: DISCONTINUED | OUTPATIENT
Start: 2022-08-29 | End: 2022-08-29 | Stop reason: SDUPTHER

## 2022-08-29 RX ORDER — ONDANSETRON 4 MG/1
4 TABLET, ORALLY DISINTEGRATING ORAL EVERY 8 HOURS PRN
Status: DISCONTINUED | OUTPATIENT
Start: 2022-08-29 | End: 2022-08-30 | Stop reason: HOSPADM

## 2022-08-29 RX ORDER — PROPOFOL 10 MG/ML
INJECTION, EMULSION INTRAVENOUS PRN
Status: DISCONTINUED | OUTPATIENT
Start: 2022-08-29 | End: 2022-08-29 | Stop reason: SDUPTHER

## 2022-08-29 RX ORDER — MIDAZOLAM HYDROCHLORIDE 1 MG/ML
INJECTION INTRAMUSCULAR; INTRAVENOUS
Status: DISCONTINUED
Start: 2022-08-29 | End: 2022-08-29 | Stop reason: WASHOUT

## 2022-08-29 RX ORDER — HYDROCODONE BITARTRATE AND ACETAMINOPHEN 5; 325 MG/1; MG/1
1 TABLET ORAL ONCE
Status: COMPLETED | OUTPATIENT
Start: 2022-08-29 | End: 2022-08-29

## 2022-08-29 RX ADMIN — FENTANYL CITRATE 50 MCG: 50 INJECTION, SOLUTION INTRAMUSCULAR; INTRAVENOUS at 07:18

## 2022-08-29 RX ADMIN — ATORVASTATIN CALCIUM 10 MG: 10 TABLET, FILM COATED ORAL at 18:39

## 2022-08-29 RX ADMIN — FENTANYL CITRATE 50 MCG: 50 INJECTION, SOLUTION INTRAMUSCULAR; INTRAVENOUS at 08:07

## 2022-08-29 RX ADMIN — LISINOPRIL 10 MG: 10 TABLET ORAL at 18:39

## 2022-08-29 RX ADMIN — HYDROMORPHONE HYDROCHLORIDE 0.5 MG: 2 INJECTION, SOLUTION INTRAMUSCULAR; INTRAVENOUS; SUBCUTANEOUS at 09:04

## 2022-08-29 RX ADMIN — FENTANYL CITRATE 50 MCG: 50 INJECTION, SOLUTION INTRAMUSCULAR; INTRAVENOUS at 07:43

## 2022-08-29 RX ADMIN — ROPIVACAINE HYDROCHLORIDE 30 ML: 5 INJECTION, SOLUTION EPIDURAL; INFILTRATION; PERINEURAL at 10:24

## 2022-08-29 RX ADMIN — ONDANSETRON 4 MG: 2 INJECTION INTRAMUSCULAR; INTRAVENOUS at 07:17

## 2022-08-29 RX ADMIN — FENTANYL CITRATE 25 MCG: 50 INJECTION, SOLUTION INTRAMUSCULAR; INTRAVENOUS at 08:49

## 2022-08-29 RX ADMIN — PROPOFOL 20 MG: 10 INJECTION, EMULSION INTRAVENOUS at 07:24

## 2022-08-29 RX ADMIN — ONDANSETRON 4 MG: 2 INJECTION INTRAMUSCULAR; INTRAVENOUS at 09:53

## 2022-08-29 RX ADMIN — FENTANYL CITRATE 25 MCG: 50 INJECTION, SOLUTION INTRAMUSCULAR; INTRAVENOUS at 08:23

## 2022-08-29 RX ADMIN — FENTANYL CITRATE 50 MCG: 50 INJECTION, SOLUTION INTRAMUSCULAR; INTRAVENOUS at 07:21

## 2022-08-29 RX ADMIN — HYDROCODONE BITARTRATE AND ACETAMINOPHEN 1 TABLET: 5; 325 TABLET ORAL at 14:20

## 2022-08-29 RX ADMIN — HYDROCHLOROTHIAZIDE 12.5 MG: 12.5 TABLET ORAL at 18:39

## 2022-08-29 RX ADMIN — PROCHLORPERAZINE EDISYLATE 5 MG: 5 INJECTION INTRAMUSCULAR; INTRAVENOUS at 13:28

## 2022-08-29 RX ADMIN — FENTANYL CITRATE 50 MCG: 50 INJECTION, SOLUTION INTRAMUSCULAR; INTRAVENOUS at 07:44

## 2022-08-29 RX ADMIN — SODIUM CHLORIDE, POTASSIUM CHLORIDE, SODIUM LACTATE AND CALCIUM CHLORIDE: 600; 310; 30; 20 INJECTION, SOLUTION INTRAVENOUS at 06:09

## 2022-08-29 RX ADMIN — OXYCODONE 10 MG: 5 TABLET ORAL at 20:05

## 2022-08-29 RX ADMIN — FENTANYL CITRATE 50 MCG: 50 INJECTION, SOLUTION INTRAMUSCULAR; INTRAVENOUS at 07:32

## 2022-08-29 RX ADMIN — DEXAMETHASONE SODIUM PHOSPHATE 10 MG: 10 INJECTION, SOLUTION INTRAMUSCULAR; INTRAVENOUS at 07:17

## 2022-08-29 RX ADMIN — SODIUM CHLORIDE, POTASSIUM CHLORIDE, SODIUM LACTATE AND CALCIUM CHLORIDE: 600; 310; 30; 20 INJECTION, SOLUTION INTRAVENOUS at 06:08

## 2022-08-29 RX ADMIN — WATER 2000 MG: 1 INJECTION INTRAMUSCULAR; INTRAVENOUS; SUBCUTANEOUS at 18:44

## 2022-08-29 RX ADMIN — SODIUM CHLORIDE, POTASSIUM CHLORIDE, SODIUM LACTATE AND CALCIUM CHLORIDE: 600; 310; 30; 20 INJECTION, SOLUTION INTRAVENOUS at 08:34

## 2022-08-29 RX ADMIN — CEFAZOLIN 2000 MG: 2 INJECTION, POWDER, FOR SOLUTION INTRAMUSCULAR; INTRAVENOUS at 07:03

## 2022-08-29 RX ADMIN — HYDROMORPHONE HYDROCHLORIDE 0.5 MG: 2 INJECTION, SOLUTION INTRAMUSCULAR; INTRAVENOUS; SUBCUTANEOUS at 08:55

## 2022-08-29 RX ADMIN — PROPOFOL 150 MG: 10 INJECTION, EMULSION INTRAVENOUS at 07:13

## 2022-08-29 RX ADMIN — PROPOFOL 30 MG: 10 INJECTION, EMULSION INTRAVENOUS at 07:31

## 2022-08-29 RX ADMIN — Medication 10 ML: at 20:06

## 2022-08-29 RX ADMIN — ASPIRIN 81 MG CHEWABLE TABLET 81 MG: 81 TABLET CHEWABLE at 18:38

## 2022-08-29 ASSESSMENT — PAIN DESCRIPTION - DESCRIPTORS
DESCRIPTORS: ACHING
DESCRIPTORS: DISCOMFORT
DESCRIPTORS: SORE

## 2022-08-29 ASSESSMENT — PAIN DESCRIPTION - ORIENTATION
ORIENTATION: RIGHT

## 2022-08-29 ASSESSMENT — PAIN SCALES - GENERAL
PAINLEVEL_OUTOF10: 3
PAINLEVEL_OUTOF10: 7
PAINLEVEL_OUTOF10: 6
PAINLEVEL_OUTOF10: 7

## 2022-08-29 ASSESSMENT — PAIN DESCRIPTION - LOCATION
LOCATION: ARM
LOCATION: WRIST

## 2022-08-29 ASSESSMENT — PAIN DESCRIPTION - FREQUENCY
FREQUENCY: CONTINUOUS
FREQUENCY: CONTINUOUS

## 2022-08-29 ASSESSMENT — PAIN - FUNCTIONAL ASSESSMENT: PAIN_FUNCTIONAL_ASSESSMENT: 0-10

## 2022-08-29 NOTE — OP NOTE
RISHI S STL ST T8 STARDRV RECESS - [id number]  SCREW BNE L14MM DIA2.7MM RISHI S STL ST T8 STARDRV RECESS  [email or web address]  Right 1 Implanted   GRAFT BNE SUB 5ML 1.7-10MM CANC CHIP MORSELIZED FRZ DRY - [id number]  GRAFT BNE SUB 5ML 1.7-10MM CANC CHIP MORSELIZED FRZ DRY [id number] MUSCULOSKELETAL TRANSPLANT [employer]  Right 1 Implanted         Drains: * No LDAs found *    Findings: as abvoe    Detailed Description of Procedure:   Below          SURGEON: TRINITY GONZALEZ D.O.            PREOPERATIVE DIAGNOSIS: Closed, displaced, comminuted, right distal radius fracture. POSTOPERATIVE DIAGNOSIS: Closed, displaced,comminuted,right distal radius fracture. OPERATION: Open reduction internal fixation of right wrist.      ANESTHESIA: General.      ESTIMATED BLOOD LOSS: Minimal.      COMPLICATIONS: None. IMPLANT: Arthrex distal radial locking plate. DESCRIPTION OF PROCEDURE: The patient was taken to the operative suite, was  given general anesthesia. The right wrist was identified with preoperative  time-out. The right wrist was then prepped and draped in sterile fashion. Outlined incision along the volar side of rib over the FCR tendon. I  exsanguinated the limb, inflated tourniquet to 250 mmHg. Made an incision  with #10 blade through skin and subcutaneous tissue. I dissected down to  level of FCR tendon. It was identified and fascia proximal over this was  released. I then retracted the FCR tendon radially to protect the radial  arteries. I then exposed the deep fascia which was incised with #10 blade  using a tenotomy released the fascia proximally and distally to its full  extent. I exposed the underlying quadratus. It was released from the radial  side of the wrist, the distal radius was retracted to the ulnar side. I  exposed the underlying fracture. The fracture was comminuted. It _was in 3  to 4 fragments.  It did extend into the radiocarpal as well as to the distal  radioulnar joint. There was greater than 3 fragments. I was able to  anatomically reduce the fracture with traction and volar directed force on  the radius to place it in anatomic position. A guide wire was then  placed over the radial styloid process going from distal to proximal in the  radial styloid to fixate the bone in anatomic position. The plate was then  applied to the volar surface of the bone. The screw in the oblong hole was placed first and  then our height was assessed based on ending fluoroscopy that localized the  best position. The shaft screw in the was then tightened down. Once  the appropriate position was obtained using fluoroscopy, I then placed  distal locking screws under direct visualization. They were  tightened down to and the fracture remained in anatomic position as far  as the alignment. All fragments were in good position. I then placed our 2  final shaft screws within the plate. I thoroughly irrigated the  wound upon closure. Closed the incisions with 2-0 Vicryl followed by a 3-0  Prolene in a horizontal fashion. And placed in a volar splint, recovered in  the recovery room without difficulty. The patient tolerated the procedure  well.          Electronically signed by Fuentes Alejandro DO on 8/29/2022 at 9:22 AM

## 2022-08-29 NOTE — ANESTHESIA PRE PROCEDURE
Department of Anesthesiology  Preprocedure Note       Name:  Anthony Ureña   Age:  80 y.o.  :  1940                                          MRN:  [id number]         Date:  2022      Surgeon: Martha Hurt): Mike Stack DO    Procedure: Procedure(s):  RIGHT WRIST OPEN REDUCTION INTERNAL FIXATION VS. APPLICATION EXTERNAL FIXATOR DISTAL RADIUS FRACTURE    Medications prior to admission:   Prior to Admission medications    Medication Sig Start Date End Date Taking? Authorizing Provider   docusate sodium (COLACE) 100 MG capsule Take 1 capsule by mouth daily as needed for Constipation 22   MARVIN Clemons - MANGO   HYDROcodone-acetaminophen (NORCO) 5-325 MG per tablet Take 1 tablet by mouth every 6 hours as needed for Pain for up to 7 days. 22  Mike Stack DO   Multiple Vitamins-Minerals (THERAPEUTIC MULTIVITAMIN-MINERALS) tablet Take 1 tablet by mouth in the morning.     Historical Provider, MD   COD LIVER OIL PO Take by mouth daily    Historical Provider, MD   atorvastatin (LIPITOR) 10 MG tablet Take 1 tablet by mouth in the morning. 22   Becka Chin DO   lisinopril-hydroCHLOROthiazide (PRINZIDE;ZESTORETIC) 10-12.5 MG per tablet Take 1 tablet by mouth in the morning. 22   Becka Chin DO   albuterol sulfate  (90 BASE) MCG/ACT inhaler Inhale 2 puffs into the lungs every 6 hours as needed for Wheezing    Historical Provider, MD   vitamin B-12 (CYANOCOBALAMIN) 1000 MCG tablet Take 1,000 mcg by mouth once a week 2x/weekly    Historical Provider, MD   aspirin 81 MG chewable tablet Take 81 mg by mouth daily    Historical Provider, MD   Cholecalciferol (VITAMIN D3) 400 UNIT/ML LIQD Take 5 drops by mouth daily    Historical Provider, MD   COCONUT OIL PO Take by mouth    Historical Provider, MD       Current medications:    Current Facility-Administered Medications   Medication Dose Route Frequency Provider Last Rate Last Admin    lactated ringers infusion   IntraVENous Continuous Heladio Hall MD 50 mL/hr at 22 0609 New Bag at 22 0609    ceFAZolin (ANCEF) 2,000 mg in sterile water 20 mL IV syringe  2,000 mg IntraVENous Once , APRN - CNP           Allergies:  No Known Allergies    Problem List:    Patient Active Problem List   Diagnosis Code    Essential hypertension I10    Chronic sinusitis J32.9    Mixed hyperlipidemia E78.2    Mild intermittent asthma J45.20    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       Past Medical History:        Diagnosis Date    Asthma     Hernia     Hypertension        Past Surgical History:        Procedure Laterality Date    BLADDER SURGERY      BREAST SURGERY Left     benign    DILATION AND CURETTAGE OF UTERUS      FRACTURE SURGERY      right arm    HYSTERECTOMY (CERVIX STATUS UNKNOWN)      KNEE CARTILAGE SURGERY         Social History:    Social History     Tobacco Use    Smoking status: Former     Packs/day: 0.40     Years: 20.00     Pack years: 8.00     Types: Cigarettes     Quit date:      Years since quittin.6    Smokeless tobacco: Never   Substance Use Topics    Alcohol use: No     Alcohol/week: 0.0 standard drinks                                Counseling given: Not Answered      Vital Signs (Current):   Vitals:    22 0541   BP: (!) 182/74   Pulse: 98   Resp: 18   Temp: 36.5 °C (97.7 °F)   TempSrc: Infrared   SpO2: 96%   Weight: 181 lb (82.1 kg)   Height: 5' 5\" (1.651 m)                                              BP Readings from Last 3 Encounters:   22 (!) 182/74   22 (!) 100/58   22 (!) 109/52       NPO Status: Time of last liquid consumption:                         Time of last solid consumption:                         Date of last liquid consumption: 22                        Date of last solid food consumption: 22    BMI:   Wt Readings from Last 3 Encounters:   22 181 lb (82.1 kg)   22 181 lb 9.6 oz Negative Neuro/Psych ROS              GI/Hepatic/Renal:            ROS comment: -current R inguinal hernia . Endo/Other:                      ROS comment: -nevus choroid of left eye Abdominal:             Vascular: negative vascular ROS. Other Findings:           Anesthesia Plan      general     ASA 3     (Consider right axillary block for post-op pain control-will access in PACU)  Induction: intravenous. MIPS: Postoperative opioids intended and Prophylactic antiemetics administered. Anesthetic plan and risks discussed with patient. Plan discussed with CRNA and attending. Post-op pain plan if not by surgeon: single peripheral nerve block            Lashonda Pérez RN   8/29/2022    DOS STAFF ADDENDUM:    Pt seen and examined, chart reviewed (including anesthesia, drug and allergy history). Anesthetic plan, risks, benefits, alternatives, and personnel involved discussed with patient. Patient verbalized an understanding and agrees to proceed. Plan discussed with care team members and agreed upon.     Chantale Salomon MD  Staff Anesthesiologist  6:41 AM

## 2022-08-29 NOTE — ANESTHESIA PROCEDURE NOTES
Peripheral Block    Patient location during procedure: PACU  Reason for block: post-op pain management and at surgeon's request  Start time: 8/29/2022 10:24 AM  End time: 8/29/2022 10:27 AM  Staffing  Performed: anesthesiologist   Anesthesiologist: Rey Paredes MD  Preanesthetic Checklist  Completed: patient identified, IV checked, site marked, risks and benefits discussed, surgical/procedural consents, equipment checked, pre-op evaluation, timeout performed, anesthesia consent given, oxygen available and monitors applied/VS acknowledged  Peripheral Block   Patient position: supine  Prep: Betadine  Provider prep: mask and sterile gloves  Patient monitoring: cardiac monitor, continuous pulse ox, frequent blood pressure checks and IV access  Block type: Brachial plexus and Axillary  Laterality: right  Injection technique: single-shot  Guidance: ultrasound guided  Local infiltration: lidocaine  Infiltration strength: 1 %  Local infiltration: lidocaine  Dose: 3 mL    Needle   Needle type: insulated echogenic nerve stimulator needle   Needle gauge: 21 G  Needle localization: ultrasound guidance and nerve stimulator  Needle length: 5 cm  Assessment   Injection assessment: negative aspiration for heme, no paresthesia on injection and local visualized surrounding nerve on ultrasound  Paresthesia pain: none  Slow fractionated injection: yes  Hemodynamics: stable    Additional Notes  U/S 39906. (1) Under ultrasound guidance, a 22 gauge needle was inserted and placed in close proximity to the right radial and median nerves at the axilla. (2) Ultrasound was also used to visualize the spread of the anesthetic in close proximity to the nerve being blocked. (3) The nerve appeared anatomically normal, and (4 there were no apparent abnormal pathological findings on the image that were readily visible and related to the nerve being blocked. (5) A permanent ultrasound image was saved in the patient's record.             Medications Administered  ropivacaine (NAROPIN) injection 0.5% - Perineural   30 mL - 8/29/2022 10:24:00 AM

## 2022-08-29 NOTE — PROGRESS NOTES
Axillary nerve block done in post op phase by Dr. Monique Justice. 1021- Time out complete  1024- Nerve block started using ultrasound and nerve stimulator. 1027- nerve block complete, patient received 30 cc of ropivacaine  Patient tolerated procedure well, see flowsheet for vital signs.

## 2022-08-29 NOTE — PROGRESS NOTES
8/29/22 1430 pt assisted x2 to bathroom. Pt gait is unsteady and pt requires assistance with joanie care and dressing. Pt talked of \"knocking on door for neighbors to help her pull up her pants\" at home. Pt's nephew is unsure if he can take care of pt in this condition. Dr Fofana Ahr aware and orders received and per dr Fofana Ahr please let pt and nephew be aware of possibity that insurance would not be cover her stay at hospital. [address] worker aware of this and also pt's concern of cost of hopstialization. Per  pt would need evaluated by PT and OT to see what pt is capable of doing on her own and if this is related to anesthesia. Pt and her nephew aware and verbalized understanding. Judy monroe

## 2022-08-29 NOTE — ANESTHESIA POSTPROCEDURE EVALUATION
Department of Anesthesiology  Postprocedure Note    Patient: Rashawn Ferreira  MRN: [id number]  YOB: 1940  Date of evaluation: 8/29/2022      Procedure Summary     Date: 08/29/22 Room / Location: [address] [place] [address]    Anesthesia Start: 0701 Anesthesia Stop: 2111    Procedure: RIGHT WRIST OPEN REDUCTION INTERNAL FIXATION VS. APPLICATION EXTERNAL FIXATOR DISTAL RADIUS FRACTURE (Right) Diagnosis:       Skillern's fracture, right, closed, initial encounter      (Skillern's fracture, right, closed, initial encounter [S52.591A, S52.211A])    Surgeons: Ivelisse Dow DO Responsible Provider: Kathleen Lackey MD    Anesthesia Type: general ASA Status: 3          Anesthesia Type: No value filed. Howard Phase I: Howard Score: 10    Howard Phase II: Howard Score: 10      Anesthesia Post Evaluation    Patient location during evaluation: PACU  Patient participation: complete - patient participated  Level of consciousness: awake  Airway patency: patent  Nausea & Vomiting: no nausea and no vomiting  Complications: no  Cardiovascular status: hemodynamically stable  Respiratory status: acceptable  Hydration status: euvolemic  Comments: Good effect from axillary nerve block done in PACU post-op.   Multimodal analgesia pain management approach

## 2022-08-29 NOTE — H&P
Updated H&P    Chief Complaint   Patient presents with    Wrist Pain       Right Wrist had a fall going down the stairs DOI 08/16/2022         Fiordaliza Dawn is a 80y.o. year old  female who presents for evaluation of right wrist pain. she reports this started 8/20/22. she does remember a specific injury that started the pain. She fell. The injury was direct trauma, fall. The pain is located mainly over distal radius. The pain is worse with movcement and better with rest.  The patient has tried OTC analgesics, ice. The treatment has not been effective. The patient is left dominant. She was reduced in splint. .     Past Medical History        Past Medical History:   Diagnosis Date    Asthma      Hernia      Hypertension           Past Surgical History         Past Surgical History:   Procedure Laterality Date    BLADDER SURGERY        BREAST SURGERY Left       benign    DILATION AND CURETTAGE OF UTERUS        FRACTURE SURGERY         right arm    HYSTERECTOMY (CERVIX STATUS UNKNOWN)        KNEE CARTILAGE SURGERY               Current Medication      Current Outpatient Medications:     Multiple Vitamins-Minerals (THERAPEUTIC MULTIVITAMIN-MINERALS) tablet, Take 1 tablet by mouth in the morning., Disp: , Rfl:     COD LIVER OIL PO, Take by mouth daily, Disp: , Rfl:     atorvastatin (LIPITOR) 10 MG tablet, Take 1 tablet by mouth in the morning., Disp: 90 tablet, Rfl: 0    lisinopril-hydroCHLOROthiazide (PRINZIDE;ZESTORETIC) 10-12.5 MG per tablet, Take 1 tablet by mouth in the morning., Disp: 90 tablet, Rfl: 1    albuterol sulfate  (90 BASE) MCG/ACT inhaler, Inhale 2 puffs into the lungs every 6 hours as needed for Wheezing, Disp: , Rfl:     vitamin B-12 (CYANOCOBALAMIN) 1000 MCG tablet, Take 1,000 mcg by mouth once a week 2x/weekly, Disp: , Rfl:     aspirin 81 MG chewable tablet, Take 81 mg by mouth daily, Disp: , Rfl:     Cholecalciferol (VITAMIN D3) 400 UNIT/ML LIQD, Take 5 drops by mouth daily, Disp: , Rfl:     COCONUT OIL PO, Take by mouth, Disp: , Rfl:      No Known Allergies  Social History               Socioeconomic History    Marital status: Single       Spouse name: Not on file    Number of children: Not on file    Years of education: Not on file    Highest education level: Not on file   Occupational History    Not on file   Tobacco Use    Smoking status: Former       Packs/day: 0.40       Years: 20.00       Pack years: 8.00       Types: Cigarettes       Quit date: 36       Years since quittin.6    Smokeless tobacco: Never   Substance and Sexual Activity    Alcohol use: No       Alcohol/week: 0.0 standard drinks    Drug use: No    Sexual activity: Not on file   Other Topics Concern    Not on file   Social History Narrative    Not on file      Social Determinants of Health          Financial Resource Strain: Low Risk     Difficulty of Paying Living Expenses: Not hard at all   Food Insecurity: No Food Insecurity    Worried About Running Out of Food in the Last Year: Never true    Ran Out of Food in the Last Year: Never true   Transportation Needs: Not on file   Physical Activity: Not on file   Stress: Not on file   Social Connections: Not on file   Intimate Partner Violence: Not on file   Housing Stability: Not on file         Family History         Family History   Problem Relation Age of Onset    Lung Cancer Father      Kidney Cancer Sister      Kidney Cancer Brother              REVIEW OF SYSTEMS:      General/Constitution:  (-)weight loss, (-)fever, (-)chills, (-)weakness. Skin: (-) rash,(-) psoriasis,(-) eczema, (-)skin cancer. Musculoskeletal: (-) fractures,  (-) dislocations,(-) collagen vascular disease, (-) fibromyalgia, (-) multiple sclerosis, (-) muscular dystrophy, (-) RSD,(-) joint pain (-)swelling, (-) joint pain,swelling. Neurologic: (-) epilepsy, (-)seizures,(-) brain tumor,(-) TIA, (-)stroke, (-)headaches, (-)Parkinson disease,(-) memory loss, (-) LOC.   Cardiovascular: (-) is normal and is well perfused to distal extremities. There are 2+ radial pulses bilaterally, and motor and sensation is intact to median, ulnar, and radial, musclocutaneus, and axillary nerve distribution and grossly symmetric bilaterally. There is cap refill noted less than two seconds in all digits. There is not edema of the bilateral upper extremities. There is not varicosities noted in the distal extremities. Lymph:  Upon palpation,  there is no lymphadenopathy noted in bilateral upper extremities. Musculoskeletal:  Gait: normal; examination of the nails and digits reveal no cyanosis or clubbing. Cervical Exam:  On physical exam, Tara Sears is well-developed, well-nourished, oriented to person, place and time. her gait is normal.  On evaluation of her cervical spine, she has full range of motion of the cervical spine without pain. There is no cervical tenderness to palpation. Shoulder Exam:  On evaluation of her bilaterally upper extremities, her bilateral shoulder has no deformity. There is not evidence of scapular dyskinesis. There is not muscle atrophy in shoulder girdle. The range of motion for the Right Shoulder is 15/50/t8 and for the Left shoulder is 150/50/t8. Right shoulder Motor strength is 5/5 in the supraspinatus, 5/5 internal rotation and 5/5 in external rotation, and Left shoulder motor strength 5/5 in supraspinatus, 5/5 in internal rotation, 5/5 in external rotation. Elbow exam:  Evaluation of the elbow, reveals no signs of swelling or deformity. ROM is 0-140. There is not instability with varus/valgus stresses. Motor strength is 5/5 with flexion/extension. Wrist exam:  Splint intact  Arom fingres  + epl  Sensation intact  Bcr <3 seconds     Right:  Phallens sign(-), Tinnells sign (-), Median nerve compression test (-),  Finklesteins (-), CMC Grind test (-), [employer]Native(-).    Left:     Phallens sign(-), Tinnells sign (-), Median nerve compression

## 2022-08-29 NOTE — CARE COORDINATION
8/29/2022: SS Note/Discharge plan:  SS Consult for post-op d/c planning and HHC for home PT/OT noted, per nursing, pt relayed no [facility] agency history or agency preference, pt's nephew from [place] is here with pt to provide her transport home and is staying to help pt temporarily, referral made to [facility] for [facility], agency will contact pt to arrange home therapy visits tomorrow,  [facility] notified.  Electronically signed by ISABEL Barr on 8/29/2022 at 2:01 PM

## 2022-08-29 NOTE — PROGRESS NOTES
CLINICAL PHARMACY NOTE: MEDS TO BEDS    Total # of Prescriptions Filled: 2   The following medications were delivered to the patient:  Norco 5-325 mg   Cephalexin 500 mg    Additional Documentation:

## 2022-08-30 VITALS
WEIGHT: 181 LBS | TEMPERATURE: 98.4 F | DIASTOLIC BLOOD PRESSURE: 64 MMHG | BODY MASS INDEX: 30.16 KG/M2 | SYSTOLIC BLOOD PRESSURE: 118 MMHG | RESPIRATION RATE: 18 BRPM | HEIGHT: 65 IN | HEART RATE: 84 BPM | OXYGEN SATURATION: 99 %

## 2022-08-30 LAB — SARS-COV-2, NAAT: NOT DETECTED

## 2022-08-30 PROCEDURE — 6360000002 HC RX W HCPCS: Performed by: FAMILY MEDICINE

## 2022-08-30 PROCEDURE — 97530 THERAPEUTIC ACTIVITIES: CPT | Performed by: PHYSICAL THERAPIST

## 2022-08-30 PROCEDURE — G0378 HOSPITAL OBSERVATION PER HR: HCPCS

## 2022-08-30 PROCEDURE — 6370000000 HC RX 637 (ALT 250 FOR IP): Performed by: FAMILY MEDICINE

## 2022-08-30 PROCEDURE — 97165 OT EVAL LOW COMPLEX 30 MIN: CPT

## 2022-08-30 PROCEDURE — 97161 PT EVAL LOW COMPLEX 20 MIN: CPT | Performed by: PHYSICAL THERAPIST

## 2022-08-30 PROCEDURE — 97530 THERAPEUTIC ACTIVITIES: CPT

## 2022-08-30 PROCEDURE — 87635 SARS-COV-2 COVID-19 AMP PRB: CPT

## 2022-08-30 PROCEDURE — 2580000003 HC RX 258: Performed by: FAMILY MEDICINE

## 2022-08-30 PROCEDURE — 97535 SELF CARE MNGMENT TRAINING: CPT

## 2022-08-30 RX ADMIN — HYDROCHLOROTHIAZIDE 12.5 MG: 12.5 TABLET ORAL at 08:49

## 2022-08-30 RX ADMIN — LISINOPRIL 10 MG: 10 TABLET ORAL at 08:53

## 2022-08-30 RX ADMIN — ATORVASTATIN CALCIUM 10 MG: 10 TABLET, FILM COATED ORAL at 08:49

## 2022-08-30 RX ADMIN — ASPIRIN 81 MG CHEWABLE TABLET 81 MG: 81 TABLET CHEWABLE at 08:49

## 2022-08-30 RX ADMIN — OXYCODONE 10 MG: 5 TABLET ORAL at 14:30

## 2022-08-30 RX ADMIN — OXYCODONE 10 MG: 5 TABLET ORAL at 05:11

## 2022-08-30 RX ADMIN — ACETAMINOPHEN 650 MG: 325 TABLET ORAL at 08:51

## 2022-08-30 RX ADMIN — WATER 2000 MG: 1 INJECTION INTRAMUSCULAR; INTRAVENOUS; SUBCUTANEOUS at 01:40

## 2022-08-30 RX ADMIN — Medication 10 ML: at 08:52

## 2022-08-30 ASSESSMENT — PAIN SCALES - GENERAL
PAINLEVEL_OUTOF10: 8
PAINLEVEL_OUTOF10: 1
PAINLEVEL_OUTOF10: 7
PAINLEVEL_OUTOF10: 3

## 2022-08-30 ASSESSMENT — PAIN DESCRIPTION - DESCRIPTORS: DESCRIPTORS: ACHING;DISCOMFORT

## 2022-08-30 ASSESSMENT — PAIN DESCRIPTION - PAIN TYPE: TYPE: SURGICAL PAIN

## 2022-08-30 ASSESSMENT — PAIN DESCRIPTION - ONSET: ONSET: GRADUAL

## 2022-08-30 ASSESSMENT — PAIN DESCRIPTION - FREQUENCY: FREQUENCY: INTERMITTENT

## 2022-08-30 ASSESSMENT — PAIN DESCRIPTION - LOCATION
LOCATION: WRIST
LOCATION: WRIST

## 2022-08-30 ASSESSMENT — PAIN - FUNCTIONAL ASSESSMENT: PAIN_FUNCTIONAL_ASSESSMENT: PREVENTS OR INTERFERES SOME ACTIVE ACTIVITIES AND ADLS

## 2022-08-30 ASSESSMENT — PAIN DESCRIPTION - ORIENTATION
ORIENTATION: RIGHT
ORIENTATION: RIGHT

## 2022-08-30 NOTE — DISCHARGE INSTRUCTIONS
Your information:  Name: Tl Chacon  : 1940    Your instructions:    Discharge to [facility]. Nonweightbearing to RUE. Splint and sling. Ice and elevate RUE. What to do after you leave the hospital:    Recommended diet: regular diet    Recommended activity: activity as tolerated and NWB RUE    The following personal items were collected during your admission and were returned to you:    Belongings  Dental Appliances: None  Vision - Corrective Lenses: Eyeglasses  Hearing Aid: None  Clothing: Pants, Shirt, Socks, Footwear  Jewelry: None  Body Piercings Removed: N/A  Electronic Devices: None  Weapons (Notify Protective Services/Security): None  Other Valuables: At home  Home Medications: None  Valuables Given To: Patient  Provide Name(s) of Who Valuable(s) Were Given To: self  Responsible person(s) in the waiting room: steph  Patient approves for provider to speak to responsible person post operatively: Yes    Information obtained by:  By signing below, I understand that if any problems occur once I leave the hospital I am to contact Dr. Nita Steel. I understand and acknowledge receipt of the instructions indicated above.

## 2022-08-30 NOTE — DISCHARGE INSTR - COC
Continuity of Care Form    Patient Name: Tl Chacon   :  1940  MRN:  [id number]    Admit date:  2022  Discharge date:  ***    Code Status Order: Full Code   Advance Directives:   [facility] Documentation       Date/Time Healthcare Directive Type of Healthcare Directive Copy in [facility] [address] Agent's Name Healthcare Agent's Phone Number    22 [id number] --  not sure -- -- -- -- --            Admitting Physician:  Parker Gustafson DO  PCP: Martha Bravo DO    Discharging Nurse: [facility] Unit/Room#: 0325/0325-01  Discharging Unit Phone Number: ***    Emergency Contact:   Extended Emergency Contact Information  Primary Emergency Contact: [facility] Phone: [phone]  Relation: Niece/Nephew   needed?  No  Secondary Emergency Contact: Dr. Jonah Veliz Phone: [phone]  Mobile Phone: [phone]  Relation: Other Relative    Past Surgical History:  Past Surgical History:   Procedure Laterality Date    BLADDER SURGERY      BREAST SURGERY Left     benign    DILATION AND CURETTAGE OF UTERUS      FRACTURE SURGERY      right arm    HYSTERECTOMY (CERVIX STATUS UNKNOWN)      KNEE CARTILAGE SURGERY      WRIST FRACTURE SURGERY Right 2022    RIGHT WRIST OPEN REDUCTION INTERNAL FIXATION VS. APPLICATION EXTERNAL FIXATOR DISTAL RADIUS FRACTURE performed by Parker Gustafson DO at [address] [place] [address] OR       Immunization History:   Immunization History   Administered Date(s) Administered    COVID-19, PFIZER GRAY top, DO NOT Dilute, (age 15 y+), IM, 30 mcg/0.3 mL 2022    COVID-19, PFIZER PURPLE top, DILUTE for use, (age 15 y+), 30mcg/0.3mL 2021, 2021, 2021    Influenza A (G0R3-48) Vaccine PF IM 2009    Influenza Vaccine, unspecified formulation 2016, 2017    Influenza, FLUAD, (age 72 y+), Adjuvanted 10/13/2020, 10/01/2021    Influenza, High Dose (Fluzone 65 yrs and older) 2015, 09/19/2016, 09/24/2018    Pneumococcal Conjugate 13-valent ([id number]) 03/04/2016    Pneumococcal Polysaccharide (Fknigkjcg12) 09/20/2017    Tdap (Boostrix, Adacel) 04/18/2018, 05/27/2019       Active Problems:  Patient Active Problem List   Diagnosis Code    Essential hypertension I10    Chronic sinusitis J32.9    Mixed hyperlipidemia E78.2    Mild intermittent asthma J45.20    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    Post-operative state Z98.890    Traumatic closed displaced fracture of distal end of radius, right, initial encounter S52.501A       Isolation/Infection:   Isolation            No Isolation          Patient Infection Status       None to display            Nurse Assessment:  Last Vital Signs: /64   Pulse 84   Temp 98.4 °F (36.9 °C) (Oral)   Resp 18   Ht 5' 5\" (1.651 m)   Wt 181 lb (82.1 kg)   LMP  (LMP Unknown)   SpO2 99%   BMI 30.12 kg/m²     Last documented pain score (0-10 scale): Pain Level: 7  Last Weight:   Wt Readings from Last 1 Encounters:   08/29/22 181 lb (82.1 kg)     Mental Status:  oriented, alert, and forgetful at times    IV Access:  - None    Nursing Mobility/ADLs:  Walking   Independent  Transfer  Independent  Bathing  Independent  Dressing  Independent  Toileting  Independent  Feeding  Independent  Med [address]  Med Delivery   whole    Wound Care Documentation and Therapy:  Incision 08/29/22 Radial Distal;Right (Active)   Dressing Status Clean;Dry; Intact 08/30/22 0800   Dressing/Treatment Ace wrap 08/30/22 0800   Drainage Amount None 08/30/22 0800   Odor None 08/29/22 2000   Number of days: 1        Elimination:  Continence: Bowel: Yes  Bladder: Yes  Urinary Catheter: None   Colostomy/Ileostomy/Ileal Conduit: No       Date of Last BM: ***    Intake/Output Summary (Last 24 hours) at 8/30/2022 1618  Last data filed at 8/30/2022 1303  Gross per 24 hour   Intake 600 ml   Output --   Net 600 ml     I/O last 3 completed shifts:   In: 1400 [I.V.:1400]  Out: 100 signed by Darrick Welch DO on 8/30/22 at 4:07 PM EDT

## 2022-08-30 NOTE — PLAN OF CARE
Problem: Discharge Planning  Goal: Discharge to home or other facility with appropriate resources  8/30/2022 1136 by Teresa Olmstead RN  Outcome: Progressing  8/29/2022 2159 by Jessica Alejo RN  Outcome: Progressing  Flowsheets (Taken 8/29/2022 1816 by Deyanira Mcmillan RN)  Discharge to home or other facility with appropriate resources: Identify barriers to discharge with patient and caregiver     Problem: Pain  Goal: Verbalizes/displays adequate comfort level or baseline comfort level  8/30/2022 1136 by Teresa Olmstead RN  Outcome: Progressing  8/29/2022 2159 by Jessica Alejo RN  Outcome: Progressing     Problem: ABCDS Injury Assessment  Goal: Absence of physical injury  8/30/2022 1136 by Teresa Olmstead RN  Outcome: Progressing  8/29/2022 2159 by Jessica Alejo RN  Outcome: Progressing

## 2022-08-30 NOTE — PROGRESS NOTES
Comprehensive Nutrition Assessment    Type and Reason for Visit:  Initial, Positive Nutrition Screen    Nutrition Recommendations/Plan:   Start Ensure Enlive TID  Will continue to monitor. Malnutrition Assessment:  Malnutrition Status: Moderate malnutrition (08/30/22 1221)    Context:  Chronic Illness     Findings of the 6 clinical characteristics of malnutrition:  Energy Intake:  75% or less estimated energy requirements for 1 month or longer  Weight Loss:  Unable to assess     Body Fat Loss:  Mild body fat loss Buccal region   Muscle Mass Loss:  Mild muscle mass loss Temples (temporalis)  Fluid Accumulation:  No significant fluid accumulation     Strength:  Not Performed    Nutrition Assessment:    Pt adm d/t fall w/ R wrist fx s/p ORIF (8/29). Note FTT/moderate malnutrition. Start ONS BID & monitor. Nutrition Related Findings:    A&Ox4, abd WNL, +2 RUE edema, +1.5L I/Os   Wound Type: Surgical Incision       Current Nutrition Intake & Therapies:    Average Meal Intake: 26-50%  Average Supplements Intake: None Ordered  ADULT DIET; Regular    Anthropometric Measures:  Height: 5' 5\" (165.1 cm)  Ideal Body Weight (IBW): 125 lbs (57 kg)    Current Body Weight: 181 lb (82.1 kg) (based on 8/24 actual - UTO updated measured wt), 144.8 % IBW. Current BMI (kg/m2): 30.1  Usual Body Weight:  (note ~180-190lbs per EMR review)  Weight Adjustment For: No Adjustment  BMI Categories: Obese Class 1 (BMI 30.0-34. 9)    Estimated Daily Nutrient Needs:  Energy Requirements Based On: Formula  Weight Used for Energy Requirements: Current  Energy (kcal/day): 8872-0771  Weight Used for Protein Requirements: Ideal  Protein (g/day): 75-85  Method Used for Fluid Requirements: 1 ml/kcal  Fluid (ml/day): 8908-5917    Nutrition Diagnosis:   Moderate malnutrition, In context of chronic illness related to inadequate protein-energy intake (FTT) as evidenced by Criteria as identified in malnutrition assessment, poor intake prior to

## 2022-08-30 NOTE — PROGRESS NOTES
Department of Orthopedic Surgery  Resident Progress Note    Patient seen and examined. Pain is well controlled, she underwent ORIF of her right distal radius fracture yesterday with plans to be discharged home afterwards, however family was here and was concerned because she was having balance issues (gets around with a cane typically in her right hand) and they were worried that she would not be able to complete her ADLs. Patient was admitted for placement.   Family at bedside this morning, I spoke with her nephew with regards to getting her placed somewhere until she is able to go home and live independently again    VITALS:  /86   Pulse 92   Temp 98.3 °F (36.8 °C) (Oral)   Resp 16   Ht 5' 5\" (1.651 m)   Wt 181 lb (82.1 kg)   LMP  (LMP Unknown)   SpO2 99%   BMI 30.12 kg/m²     General: alert and oriented to person, place and time, well-developed and well-nourished, in no acute distress    MUSCULOSKELETAL:   right upper extremity:  Dressing C/D/I, splint and ace maintained  Moderate ecchymosis in the digits, unchanged from prior to surgery  Compartments soft and compressible  +AIN/PIN/Ulnar/Median/Radial nerve function intact grossly  +2/4 Radial pulse, Cap refill <2 sec  Sensation intact to touch in radial/ulnar/median nerve distributions to hand    CBC:   Lab Results   Component Value Date/Time    WBC 5.8 08/11/2022 12:13 PM    HGB 14.4 08/11/2022 12:13 PM    HCT 42.9 08/11/2022 12:13 PM     08/11/2022 12:13 PM     PT/INR:  No results found for: PROTIME, INR      ASSESSMENT  S/P right distal radius fracture ORIF  Failure to thrive in adult    PLAN      Continue physical therapy and protocol: NWB -right UE  Deep venous thrombosis prophylaxis -per primary, early mobilization  Patient was admitted by orthopedics because she was unable to care for herself and family was concerned that she would need to be placed somewhere or would have to have home health care established  PT/OT  Social work recommendations appreciated  Pain Control: IV and PO  D/C Plan: From an orthopedic standpoint, the patient can be discharged either home or placed in a facility if need be whenever an appropriate plan is in place

## 2022-08-30 NOTE — PROGRESS NOTES
[facility]  [facility] [place]. [place]        Date:2022                                                  Patient Name: Shamika Callahan    MRN: [id number]    : 1940    Room: [address] [place] [address]      Evaluating OT: Von Mercado OTR/L; [id number]     Referring Provider and Specific Provider Orders/Date:      22  OT eval and treat  Start:  22,   End:  22 1800,   ONE TIME,   Standing Count:  1 Occurrences,   R         Adra Anchors, DO      Placement Recommendation: Subacute        Diagnosis:   1.  Closed fracture of right wrist, initial encounter         Surgery: R wrist ORIF      Pertinent Medical History:       Past Medical History:   Diagnosis Date    Asthma     Hernia     Hypertension          Past Surgical History:   Procedure Laterality Date    BLADDER SURGERY      BREAST SURGERY Left     benign    DILATION AND CURETTAGE OF UTERUS      FRACTURE SURGERY      right arm    HYSTERECTOMY (CERVIX STATUS UNKNOWN)      KNEE CARTILAGE SURGERY      WRIST FRACTURE SURGERY Right 2022    RIGHT WRIST OPEN REDUCTION INTERNAL FIXATION VS. APPLICATION EXTERNAL FIXATOR DISTAL RADIUS FRACTURE performed by Tone Gomes DO at [facility] OR      Precautions:  Fall Risk, NWB: R UE d/t ORIF of right distal radius fracture, R UE sling, Gentle active range of motion of the elbow and shoulder     Assessment of current deficits    [x] Functional mobility  [x]ADLs  [x] Strength               []Cognition    [x] Functional transfers   [x] IADLs         [x] Safety Awareness   [x]Endurance    [x] Fine Coordination              [x] Balance      [] Vision/perception   []Sensation     []Gross Motor Coordination  [x] ROM  [] Delirium                   [] Motor Control     OT PLAN OF CARE   OT POC based on physician orders, patient diagnosis and results of clinical assessment    Frequency/Duration 1-3 days/wk for 2 weeks PRN     Specific OT Treatment Interventions to include:   * Instruction/training on adapted ADL techniques and AE recommendations to increase functional independence within precautions       * Training on energy conservation strategies, correct breathing pattern and techniques to improve independence/tolerance for self-care routine  * Functional transfer/mobility training/DME recommendations for increased independence, safety, and fall prevention  * Patient/Family education to increase follow through with safety techniques and functional independence  * Recommendation of environmental modifications for increased safety with functional transfers/mobility and ADLs  * Splinting/positioning for increased function, prevention of contractures, and improve skin integrity  * Therapeutic exercise to improve motor endurance, ROM, and functional strength for ADLs/functional transfers  * Therapeutic activities to facilitate/challenge dynamic balance, stand tolerance for increased safety and independence with ADLs  * Positioning to improve skin integrity, interaction with environment and functional independence    Recommended Adaptive Equipment: TBD at rehab     Home Living: alone (nephew currently in town from [facility] but will be returning to [facility] shortly); 2nd floor apartment, 1 story, no steps, elevator, tub shower (unused). Equipment owned: wheeled walker, cane, grab bars    Prior Level of Function: Independent with ADLs , Independent with IADLs; ambulated with no device, sponge bathes    Driving: yes   Occupation: retired     Pain Level: 9-10/10 pain in Right wrist; Nursing notified.       Cognition: A&O: 3/4; Follows 1 step directions   Memory: fair    Sequencing: fair    Problem solving: fair    Judgement/safety: fair     [facility]   AM-PAC Daily Activity Inpatient   How much help for putting on and taking off regular lower body clothing?: A Lot  How much help for Bathing?: A Lot  How much help for Toileting?: A Little  How much help for putting on and taking off regular upper body clothing?: A Lot  How much help for taking care of personal grooming?: A Little  How much help for eating meals?: A Little  AM-PAC Inpatient Daily Activity Raw Score: 15  AM-PAC Inpatient ADL T-Scale Score : 34.69  ADL Inpatient CMS 0-100% Score: 56.46  ADL Inpatient CMS G-Code Modifier : CK     Functional Assessment:    Initial Eval Status  Date: 8/30/22 Treatment Status  Date: STGs = LTGs  Time frame: 10-14 days   Feeding Supervision with set up   Independent    Grooming Minimal Assist   Independent    UB Dressing Moderate Assist to adjust gown and tie gown  Maximal assist to adjust sling to proper fit  Supervision    LB Dressing Maximal Assist to doff and don incontinent garment while seated on commode and assistance to bring garment up around hips and waist upon standing. Supervision    Bathing Maximal Assist   Supervision    Toileting Supervision for hygiene and to stand at sink level to wash and dry hands  Independent    Bed Mobility  Supine to sit: Minimal Assist   Sit to supine: N/T as pt was up in chair   Supine to sit: Independent   Sit to supine: Modified Gipsy    Functional Transfers Minimal Assist from straight cane. Minimal assist for transfer to and from low commode with minimal verbal cues to use grab bar for safe commode transfer. Transfer training with verbal cues for hand placement throughout session to improve safety. Independent    Functional Mobility Minimal Assist with straight cane to improve balance to and from bathroom and up to chair, verbal cues for walker sequence and safety.    Modified Gipsy    Balance Sitting:     Static: good     Dynamic: fair   Standing: fair  with  straight cane      Activity Tolerance Fair   good    Visual/  Perceptual Glasses: yes                 Hand Dominance: left      AROM (PROM) Strength Additional Info:    RUE  Limited in shoulder flexion and elbow extension, distal R UE casted d/t ORIF N/T  good  and wfl FMC/dexterity noted during ADL tasks     LUE WFL 4/5 good  and wfl FMC/dexterity noted during ADL tasks       Hearing: [facility]   Sensation:  No c/o numbness or tingling  Tone: WFL   Edema: yes, R hand, discolored     Comments: Upon arrival the patient was supine. At end of session, patient was up in chair with call light and phone within reach, all lines and tubes intact. Nephew present at end of evaluation. Overall patient demonstrated decreased independence and safety during completion of ADL/functional transfer/mobility tasks. Pt would benefit from continued skilled OT to increase safety and independence with completion of ADL/IADL tasks for functional independence and quality of life. Treatment: OT treatment provided this date includes:   Instruction/training on safety and adapted techniques for completion of ADLs   Instruction/training on safe functional mobility/transfer techniques   Instruction/training on energy conservation/work simplification for completion of ADLs   Proper Positioning/Alignment    Rehab Potential: Good for established goals. Patient / Family Goal: go to rehab      Patient and/or family were instructed on functional diagnosis, prognosis/goals and OT plan of care. Demonstrated good understanding.      Eval Complexity: Low    Time In: 2:15pm  Time Out: 3:02pm    Total Treatment Time: 27      Min Units   OT Eval Low 97165  X  1    OT Eval Medium 62425      OT Eval High 67645      OT Re-Eval 29433            ADL/Self Care 28040 17  1    Therapeutic Activities 61051  10  1    Therapeutic Ex 89763       Orthotic Management 26024       Manual 51801     Neuro Re-Ed 32099       Non-Billable Time        Evaluation Time additionally includes thorough review of current medical information, gathering information on past medical history/social history and prior level of function, interpretation of standardized testing/informal observation of tasks, assessment of data and development of plan of care and goals.         Evaluating OT: Ashely Luo OTR/L; [id number]

## 2022-08-30 NOTE — CARE COORDINATION
8/30/2022: SS Note/Discharge plan:  Notified by [facility] of pt staying overnight observation and may need a temporary [facility] placement now prior to going home, PT/OT ordered, sw met with pt, explained sw role for transition of care and pt does anticipate needing a rehab placement now, SNF list provided and pt to review with her nephew when he returns to the hospital, pt has Medicare, NO PRE CERT needed for placement, sw to follow. Electronically signed by ISABEL Astorga on 8/30/2022 at 12:00 PM

## 2022-08-30 NOTE — DISCHARGE SUMMARY
Physician Discharge Summary     Patient ID:  Anton Schroeder  [id number]  80 y.o.  1940    Admit date: 8/29/2022    Discharge date and time: No discharge date for patient encounter. Admitting Physician: Jaclyn Rosario DO     Admission Diagnoses: Skillern's fracture, right, closed, initial encounter [S52.591A, S52.211A]  Post-operative state [Z98.890]  Traumatic closed displaced fracture of distal end of radius, right, initial encounter [S52.501A]    Discharge Diagnoses: Principal Problem:    Post-operative state  Active Problems:    Traumatic closed displaced fracture of distal end of radius, right, initial encounter  Resolved Problems:    * No resolved hospital problems. *      Admission Condition: good    Discharged Condition: stable    Indication for Admission: Needs placement    Hospital Course/Procedures/Operation/treatments: And underwent ORIF of a right distal radius fracture on 8/29. While in recovery, the patient was working with physical therapy and had difficulty with balance as she walks with a cane and typically holds it in her right hand. Therapy and family had concerns that the patient would not be able to get around or take care of herself at home, so she was admitted for placement. On 8/30, the patient was accepted at [facility]. Discharged to SNF            Consults:   IP CONSULT TO SOCIAL WORK  IP CONSULT TO HOME CARE NEEDS    Significant Diagnostic Studies:   Fluoro For Surgical Procedures    Result Date: 8/29/2022  EXAMINATION: SPOT FLUOROSCOPIC IMAGES 8/29/2022 9:13 am TECHNIQUE: Fluoroscopy was provided by the radiology department for procedure. Radiologist was not present during examination. FLUOROSCOPY DOSE AND TYPE OR TIME AND EXPOSURES: Fluoroscopy time equals 144 0.1 seconds.   Total dose equals 2.77 mGy COMPARISON: None HISTORY: ORDERING SYSTEM PROVIDED HISTORY: Closed fracture of right wrist, initial encounter TECHNOLOGIST PROVIDED HISTORY: Reason for exam:->RIGHT WRIST OPEN REDUCTION INTERNAL FIXATION VS. APPLICATION EXTERNAL FIXATOR DISTAL RADIUS FRACTURE Intraprocedural imaging. FINDINGS: 2 spot images of the wrist were obtained. Intraprocedural fluoroscopic spot images as above. See separate procedure report for more information. Discharge Exam:    VITALS:  /86   Pulse 92   Temp 98.3 °F (36.8 °C) (Oral)   Resp 16   Ht 5' 5\" (1.651 m)   Wt 181 lb (82.1 kg)   LMP  (LMP Unknown)   SpO2 99%   BMI 30.12 kg/m²      General: alert and oriented to person, place and time, well-developed and well-nourished, in no acute distress     MUSCULOSKELETAL:   right upper extremity:  Dressing C/D/I, splint and ace maintained  Moderate ecchymosis in the digits, unchanged from prior to surgery  Compartments soft and compressible  +AIN/PIN/Ulnar/Median/Radial nerve function intact grossly  +2/4 Radial pulse, Cap refill <2 sec  Sensation intact to touch in radial/ulnar/median nerve distributions to hand    Disposition: SNF    In process/preliminary results:  Outstanding Order Results       No orders found for last 30 day(s). Patient Instructions:   Current Discharge Medication List             Details   cephALEXin (KEFLEX) 500 MG capsule Take 1 capsule by mouth 3 times daily for 10 doses  Qty: 10 capsule, Refills: 0                Details   HYDROcodone-acetaminophen (NORCO) 5-325 MG per tablet Take 1 tablet by mouth every 6 hours as needed for Pain for up to 7 days. Intended supply: 7 days.  Take lowest dose possible to manage pain  Qty: 28 tablet, Refills: 0    Comments: Reduce doses taken as pain becomes manageable  Associated Diagnoses: Closed fracture of right wrist, initial encounter                Details   docusate sodium (COLACE) 100 MG capsule Take 1 capsule by mouth daily as needed for Constipation  Qty: 30 capsule, Refills: 0    Associated Diagnoses: Constipation, unspecified constipation type      Multiple Vitamins-Minerals (THERAPEUTIC MULTIVITAMIN-MINERALS) tablet Take 1 tablet by mouth in the morning. COD LIVER OIL PO Take by mouth daily      atorvastatin (LIPITOR) 10 MG tablet Take 1 tablet by mouth in the morning. Qty: 90 tablet, Refills: 0    Associated Diagnoses: Pure hypercholesterolemia      lisinopril-hydroCHLOROthiazide (PRINZIDE;ZESTORETIC) 10-12.5 MG per tablet Take 1 tablet by mouth in the morning. Qty: 90 tablet, Refills: 1    Associated Diagnoses: Essential hypertension      albuterol sulfate  (90 BASE) MCG/ACT inhaler Inhale 2 puffs into the lungs every 6 hours as needed for Wheezing      vitamin B-12 (CYANOCOBALAMIN) 1000 MCG tablet Take 1,000 mcg by mouth once a week 2x/weekly      aspirin 81 MG chewable tablet Take 81 mg by mouth daily      Cholecalciferol (VITAMIN D3) 400 UNIT/ML LIQD Take 5 drops by mouth daily      COCONUT OIL PO Take by mouth             Nonweightbearing right upper extremity  Gentle active range of motion of the elbow and shoulder  Pain medication as prescribed  Antibiotics as prescribed  Follow-up in the office with Dr. Margaret Terrell in 1 to 2 weeks    Follow up:    Sonia Harris, [address] [place] [address]  [phone]    Schedule an appointment as soon as possible for a visit in 1 week(s)      [address] [place] [address], [address] [place] [address]             Signed:  Seema Garner DO  8/30/2022  4:07 PM

## 2022-08-30 NOTE — CARE COORDINATION
8/30/2022: SS Note/Discharge plan: Observation:  Notified by Ariane Cruz admission for [facility] that pt was accepted for [facility] placement for admission today 8/30 , PASRR and MARINE completed, request a negative RAPID COVID TEST prior to transfer, nursing notified, pt's nephew to provide pt's transport. Electronically signed by ISABEL Jones on 8/30/2022 at 4:16 PM

## 2022-08-30 NOTE — PROGRESS NOTES
Physical Therapy  Physical Therapy Initial Evaluation/Plan of Care    Room #:  0325/0325-01  Patient Name: Donnie Dawson  YOB: 1940  MRN: [id number]    Date of Service: 8/30/2022     Tentative placement recommendation: Subacute rehab  Equipment recommendation: To be determined      Evaluating Physical Therapist: Magan Lamb PT, DPT #[id number]      Specific Provider Orders/Date/Referring Provider :     08/29/22 1800    PT eval and treat  Start:  08/29/22 1800,   End:  08/29/22 1800,   ONE TIME,   Standing Count:  1 Occurrences,   R         Gutierrez Corrigan, DO Acknowledge New     Admitting Diagnosis:   Skillern's fracture, right, closed, initial encounter [S52.591A, S52.211A]  Post-operative state [Z98.890]  Traumatic closed displaced fracture of distal end of radius, right, initial encounter [S52.501A]      Surgery: R Wrist ORIF on 8/29/22  Visit Diagnoses         Codes    Closed fracture of right wrist, initial encounter     S62.101A            Patient Active Problem List   Diagnosis    Essential hypertension    Chronic sinusitis    Mixed hyperlipidemia    Mild intermittent asthma    Nevus of choroid of left eye    Post-operative state    Traumatic closed displaced fracture of distal end of radius, right, initial encounter        ASSESSMENT of Current Deficits Patient exhibits decreased strength, balance, and endurance impairing functional mobility, transfers, gait , gait distance, and tolerance to activity. Pt unsteady during function and was pleasantly confused during session. Pt currently not safe to return home safely on her own and would benefit from additional therapy to improve stability, endurance, reduce fall risk, increase strength and balance.         PHYSICAL THERAPY  PLAN OF CARE       Physical therapy plan of care is established based on physician order,  patient diagnosis and clinical assessment    Current Treatment Recommendations:    -Bed Mobility: Lower extremity exercises  and Trunk control activities   -Sitting Balance: Incorporate reaching activities to activate trunk muscles , Hands on support to maintain midline , Facilitate active trunk muscle engagement , Facilitate postural control in all planes , and Engage in core activities to allow for movement within base of support   -Standing Balance: Perform strengthening exercises in standing to promote motor control with or without upper extremity support , Instruct patient on adequate base of support to maintain balance, and Challenge balance utilizing reaching  activities beyond center of gravity    -Transfers: Provide instruction on proper hand and foot position for adequate transfer of weight onto lower extremities and use of gait device if needed, Cues for hand placement, technique and safety. Provide stabilization to prevent fall , Facilitate weight shift forward on to lower extremities and provide necessary stabilization of bilateral lower extremities , Support transfer of weight on to lower extremities, and Assist with extension of knees trunk and hip to accept weight transfer   -Gait: Gait training, Standing activities to improve: base of support, weight shift, weight bearing , Exercises to improve trunk control, and Exercises to improve hip and knee control   -Endurance: Utilize Supervised activities to increase level of endurance to allow for safe functional mobility including transfers and gait  and Use graduated activities to promote good breathing techniques and provide support and education to maximize respiratory function    PT long term treatment goals are located in below grid    Patient and or family understand(s) diagnosis, prognosis, and plan of care. Frequency of treatments: Patient will be seen  daily.          Prior Level of Function: Patient ambulated independently   Rehab Potential: fair + for baseline    Past medical history:   Past Medical History:   Diagnosis Date    Asthma     Hernia     Hypertension      Past Surgical History:   Procedure Laterality Date    BLADDER SURGERY      BREAST SURGERY Left     benign    DILATION AND CURETTAGE OF UTERUS      FRACTURE SURGERY      right arm    HYSTERECTOMY (CERVIX STATUS UNKNOWN)      KNEE CARTILAGE SURGERY      WRIST FRACTURE SURGERY Right 8/29/2022    RIGHT WRIST OPEN REDUCTION INTERNAL FIXATION VS. APPLICATION EXTERNAL FIXATOR DISTAL RADIUS FRACTURE performed by Libia Le DO at [facility]:    Precautions: Up with assistance, falls, confusion, and non weight bearing RUE      Social history: Patient lives alone in a apartment     with No steps      Equipment owned: Shalom Prajapati,      [id number] [facility] Mobility Inpatient   How much difficulty turning over in bed?: A Lot  How much difficulty sitting down on / standing up from a chair with arms?: A Little  How much difficulty moving from lying on back to sitting on side of bed?: A Lot  How much help from another person moving to and from a bed to a chair?: A Little  How much help from another person needed to walk in hospital room?: A Little  How much help from another person for climbing 3-5 steps with a railing?: A Lot  AM-PAC Inpatient Mobility Raw Score : 15  AM-PAC Inpatient T-Scale Score : 39.45  Mobility Inpatient CMS 0-100% Score: 57.7  Mobility Inpatient CMS G-Code Modifier : CK    Nursing cleared patient for PT evaluation. The admitting diagnosis and active problem list as listed above have been reviewed prior to the initiation of this evaluation. OBJECTIVE;   Initial Evaluation  Date: 8/30/2022 Treatment Date:     Short Term/ Long Term   Goals   Was pt agreeable to Eval/treatment? Yes  To be met in 2 days   Pain level   8/10  Wrist fx     Bed Mobility    Rolling: Minimal assist of 1    Supine to sit: Minimal assist of 1    Sit to supine: Minimal assist of 1    Scooting: Minimal assist of 1    Rolling: Independent    Supine to sit:  Independent    Sit to supine: Independent    Scooting: Independent     Transfers Sit to stand: Minimal assist of 1   Sit to stand: Independent    Ambulation     2 x 50 feet using  cane with Min-ModA   for balance, upright, and safety    > 100 feet using  cane with Supervision     Stair negotiation: ascended and descended   Not assessed        ROM Within functional limits    Increase range of motion 10% of affected joints    Strength BUE:  refer to OT eval  RLE:  3+/5  LLE:  3+/5  Increase strength in affected mm groups by 1/3 grade   Balance Sitting EOB:  fair   Dynamic Standing:  fair - with cane  Sitting EOB:  fair +  Dynamic Standing: fair      Patient is Alert & Oriented x person, place, time, and situation and follows directions but pleasantly confused  Sensation:  Patient  denies numbness/tingling   Edema:  no   Endurance: fair      Vitals: room air   Blood Pressure at rest  Blood Pressure during session    Heart Rate at rest  Heart Rate during session    SPO2 at rest %  SPO2 during session %     Patient education  Patient educated on role of Physical Therapy, risks of immobility, safety and plan of care, energy conservation,  importance of mobility while in hospital , ankle pumps, quad set and glut set for edema control, blood clot prevention, importance and purpose of adaptive device and adjusted to proper height for the patient. , and safety      Patient response to education:   Pt verbalized understanding Pt demonstrated skill Pt requires further education in this area   Yes Partial Yes      Treatment:  Patient practiced and was instructed/facilitated in the following treatment: Patient Sat edge of bed 15 minutes with Minimal assist of 1 to increase dynamic sitting balance and activity tolerance. Pt performed bed mobility, transfers, ambulation in room.       Therapeutic Exercises:  not performed      At end of session, patient in bed with family/friend present call light and phone within reach,  all lines and tubes intact, nursing notified. Patient would benefit from continued skilled Physical Therapy to improve functional independence and quality of life. Patient's/ family goals   rehab    Time in  0  Time out  950    Total Treatment Time  34 minutes    Evaluation time includes thorough review of current medical information, gathering information on past medical history/social history and prior level of function, completion of standardized testing/informal observation of tasks, assessment of data, and development of Plan of care and goals.      CPT codes:  Low Complexity PT evaluation (02275)  Therapeutic activities (26401)   34 minutes  2 unit(s)    Bucky Gao, PT

## 2022-08-30 NOTE — ACP (ADVANCE CARE PLANNING)
Advance Care Planning   Healthcare Decision Maker:    Primary Decision Maker: Naye Nixon - [phone]    Secondary Decision Maker: Dr. Marysol Lutz - Other Relative - [phone]    Click here to complete Healthcare Decision Makers including selection of the Healthcare Decision Maker Relationship (ie \"Primary\"). Today we documented Decision Maker(s) consistent with Legal Next of Kin hierarchy.

## 2022-08-30 NOTE — CARE COORDINATION
8/30/2022: SS Note/Discharge plan: Observation:  Met with pt and pt's nephew, Becky Rodrigue at her bedside, reviewed SNF list and therapy evals, pt and nephew in agreement to a temporary [facility] placement at [facility], referral made to [place] admission liaison and awaiting chart review and acceptance for admission today, pt's nephew to provide transport, sw will follow to confirm d/c plan and to complete PASRR and initiate MARINE, nursing notified.  Electronically signed by ISABEL Morales on 8/30/2022 at 3:29 PM

## 2022-09-09 DIAGNOSIS — M25.531 RIGHT WRIST PAIN: Primary | ICD-10-CM

## 2022-09-12 ENCOUNTER — OFFICE VISIT (OUTPATIENT)
Dept: ORTHOPEDIC SURGERY | Age: 82
End: 2022-09-12

## 2022-09-12 ENCOUNTER — TELEPHONE (OUTPATIENT)
Dept: ORTHOPEDIC SURGERY | Age: 82
End: 2022-09-12

## 2022-09-12 VITALS — WEIGHT: 181 LBS | HEIGHT: 65 IN | BODY MASS INDEX: 30.16 KG/M2

## 2022-09-12 DIAGNOSIS — S52.591A OTHER CLOSED FRACTURE OF DISTAL END OF RIGHT RADIUS, INITIAL ENCOUNTER: Primary | ICD-10-CM

## 2022-09-12 PROCEDURE — 99024 POSTOP FOLLOW-UP VISIT: CPT | Performed by: ORTHOPAEDIC SURGERY

## 2022-09-12 NOTE — TELEPHONE ENCOUNTER
Patient seen 9/12/22 for 2 week po right wrist external fix vs ORIF. Radha Cashing from [facility] calling to request orders/instructions following appointment [phone].

## 2022-09-12 NOTE — PROGRESS NOTES
Ms. Piyush Shepard returns today for follow-up of a right distal radius fracture which was treated with ORIF . Date of surgery was 8/29/22. she reports she is doing well. Physical Exam:  RUE - Skin intact with sutures         Nontender to palpation at the area of the fracture site. ROM   limited         The incision is healing well without evidence of infection. Pulses are intact and symmetric bilaterally         Strength limited         Sensation intact    Xrays:s/p orif with good alignment    Radiographic findings reviewed with patient    Impression:   Encounter Diagnosis   Name Primary?     Other closed fracture of distal end of right radius, initial encounter Yes         Plan:   Sutures removed  Ok to discharge from orthopedic standpoint with home care/OT  Exos, ok to remove for hand ROM and hygiene  Nwb  Fu in 4 weeks with xr

## 2022-09-12 NOTE — PATIENT INSTRUCTIONS
Sutures removed  Ok to discharge from orthopedic standpoint with home care/OT  Exos, ok to remove for hand ROM and hygiene  Nwb  Fu in 4 weeks with xr

## 2022-09-22 ENCOUNTER — OFFICE VISIT (OUTPATIENT)
Dept: FAMILY MEDICINE CLINIC | Age: 82
End: 2022-09-22
Payer: MEDICARE

## 2022-09-22 VITALS
HEIGHT: 65 IN | BODY MASS INDEX: 30.02 KG/M2 | TEMPERATURE: 97.4 F | SYSTOLIC BLOOD PRESSURE: 118 MMHG | HEART RATE: 78 BPM | OXYGEN SATURATION: 95 % | RESPIRATION RATE: 16 BRPM | DIASTOLIC BLOOD PRESSURE: 58 MMHG | WEIGHT: 180.2 LBS

## 2022-09-22 DIAGNOSIS — S52.501D CLOSED FRACTURE OF DISTAL END OF RIGHT RADIUS WITH ROUTINE HEALING, UNSPECIFIED FRACTURE MORPHOLOGY, SUBSEQUENT ENCOUNTER: Primary | ICD-10-CM

## 2022-09-22 PROCEDURE — 99213 OFFICE O/P EST LOW 20 MIN: CPT | Performed by: NURSE PRACTITIONER

## 2022-09-22 PROCEDURE — G8427 DOCREV CUR MEDS BY ELIG CLIN: HCPCS | Performed by: NURSE PRACTITIONER

## 2022-09-22 PROCEDURE — G8399 PT W/DXA RESULTS DOCUMENT: HCPCS | Performed by: NURSE PRACTITIONER

## 2022-09-22 PROCEDURE — 1123F ACP DISCUSS/DSCN MKR DOCD: CPT | Performed by: NURSE PRACTITIONER

## 2022-09-22 PROCEDURE — 1036F TOBACCO NON-USER: CPT | Performed by: NURSE PRACTITIONER

## 2022-09-22 PROCEDURE — 1090F PRES/ABSN URINE INCON ASSESS: CPT | Performed by: NURSE PRACTITIONER

## 2022-09-22 PROCEDURE — G8417 CALC BMI ABV UP PARAM F/U: HCPCS | Performed by: NURSE PRACTITIONER

## 2022-09-22 ASSESSMENT — ENCOUNTER SYMPTOMS
COUGH: 0
WHEEZING: 0
DIARRHEA: 0
NAUSEA: 0
VOMITING: 0
CONSTIPATION: 0
SHORTNESS OF BREATH: 0

## 2022-09-22 ASSESSMENT — PATIENT HEALTH QUESTIONNAIRE - PHQ9
SUM OF ALL RESPONSES TO PHQ QUESTIONS 1-9: 0
SUM OF ALL RESPONSES TO PHQ QUESTIONS 1-9: 0
SUM OF ALL RESPONSES TO PHQ9 QUESTIONS 1 & 2: 0
2. FEELING DOWN, DEPRESSED OR HOPELESS: 0
1. LITTLE INTEREST OR PLEASURE IN DOING THINGS: 0
SUM OF ALL RESPONSES TO PHQ QUESTIONS 1-9: 0
SUM OF ALL RESPONSES TO PHQ QUESTIONS 1-9: 0

## 2022-09-22 NOTE — PROGRESS NOTES
Anton Schroeder (:  1940) is a 80 y.o. female,Established patient, here for evaluation of the following chief complaint(s): Other (Pt was at [facility] and is now home. Hand is still discolored but slowly getting better)         ASSESSMENT/PLAN:  1. Closed fracture of distal end of right radius with routine healing, unspecified fracture morphology, subsequent encounter  -  tylenol and/of ibuprofen for pain  -  elevate hand when sitting  -  wear brace as instructed by ortho  -  f/u with ortho as scheduled  -  ice for pain control      Return if symptoms worsen or fail to improve. Subjective   SUBJECTIVE/OBJECTIVE:  HPI  Here today for f/u from [facility] for rehab after right hand surgery on . Hand is still swollen and tender. Not able to drive yet due to lack of ROM in hand. Has another f/u with ortho in 3 weeks. Patient is left handed. BP (!) 118/58   Pulse 78   Temp 97.4 °F (36.3 °C)   Resp 16   Ht 5' 5\" (1.651 m)   Wt 180 lb 3.2 oz (81.7 kg)   LMP  (LMP Unknown)   SpO2 95%   BMI 29.99 kg/m²     Review of Systems   Constitutional:  Positive for activity change (decreased due to right hand pain). Negative for appetite change, chills, diaphoresis, fever and unexpected weight change. Respiratory:  Negative for cough, shortness of breath and wheezing. Cardiovascular:  Negative for chest pain and palpitations. Gastrointestinal:  Negative for constipation, diarrhea, nausea and vomiting. Genitourinary:  Negative for difficulty urinating. Musculoskeletal:  Positive for arthralgias (right hand, wears right wrist brace at night). Neurological:  Positive for headaches (occasional). Negative for dizziness and weakness. Psychiatric/Behavioral:  Positive for sleep disturbance. Objective   Physical Exam  Constitutional:       Appearance: She is well-developed. HENT:      Head: Normocephalic and atraumatic. Neck:      Trachea: No tracheal deviation.    Cardiovascular: Rate and Rhythm: Normal rate and regular rhythm. Heart sounds: No murmur heard. Pulmonary:      Effort: Pulmonary effort is normal. No respiratory distress. Breath sounds: Normal breath sounds. Abdominal:      General: Bowel sounds are normal.      Palpations: Abdomen is soft. Tenderness: There is no abdominal tenderness. Musculoskeletal:         General: Swelling and tenderness present. Normal range of motion. Comments: Tenderness to right hand, scar consistent with surgery well healed, good pulse and cap refill to right hand, ROM and  strength limited due to pain   Skin:     General: Skin is warm and dry. Neurological:      Mental Status: She is alert and oriented to person, place, and time. Psychiatric:         Behavior: Behavior normal.          [facility]      An electronic signature was used to authenticate this note.     --MARVIN Thornton - CNP

## 2022-09-28 PROBLEM — Z98.890 POST-OPERATIVE STATE: Status: RESOLVED | Noted: 2022-08-29 | Resolved: 2022-09-28

## 2022-10-03 DIAGNOSIS — E78.00 PURE HYPERCHOLESTEROLEMIA: ICD-10-CM

## 2022-10-03 RX ORDER — ATORVASTATIN CALCIUM 10 MG/1
10 TABLET, FILM COATED ORAL DAILY
Qty: 90 TABLET | Refills: 0 | Status: SHIPPED | OUTPATIENT
Start: 2022-10-03

## 2022-10-03 NOTE — TELEPHONE ENCOUNTER
Atorvastatin    Requested Prescriptions     Pending Prescriptions Disp Refills    atorvastatin (LIPITOR) 10 MG tablet 90 tablet 0     Sig: Take 1 tablet by mouth daily       Last appt was 9/22/2022      [facility]

## 2022-10-12 DIAGNOSIS — S52.591A OTHER CLOSED FRACTURE OF DISTAL END OF RIGHT RADIUS, INITIAL ENCOUNTER: Primary | ICD-10-CM

## 2022-10-13 ENCOUNTER — OFFICE VISIT (OUTPATIENT)
Dept: ORTHOPEDIC SURGERY | Age: 82
End: 2022-10-13

## 2022-10-13 VITALS — HEIGHT: 65 IN | BODY MASS INDEX: 29.32 KG/M2 | TEMPERATURE: 98 F | WEIGHT: 176 LBS

## 2022-10-13 DIAGNOSIS — S52.591A OTHER CLOSED FRACTURE OF DISTAL END OF RIGHT RADIUS, INITIAL ENCOUNTER: Primary | ICD-10-CM

## 2022-10-13 PROCEDURE — 99024 POSTOP FOLLOW-UP VISIT: CPT | Performed by: NURSE PRACTITIONER

## 2022-10-13 NOTE — PROGRESS NOTES
Ms. Ashutosh Lopez returns today for follow-up of a right distal radius fracture which was treated with ORIF . Date of surgery was 8/29/22. she reports she is doing well. Physical Exam:  RUE - Skin intact with healed incision         Nontender to palpation at the area of the fracture site. ROM   limited         The incision is healing well without evidence of infection. Pulses are intact and symmetric bilaterally         Strength limited         Sensation intact    Xrays:s/p orif with good alignment and healing    Radiographic findings reviewed with patient    Impression:   Encounter Diagnosis   Name Primary?     Other closed fracture of distal end of right radius, initial encounter Yes         Plan:   Dc brace  No heavy lifting pushing or pulling  OT  HEP  Fu in 2 months with xr

## 2022-10-18 ENCOUNTER — EVALUATION (OUTPATIENT)
Dept: OCCUPATIONAL THERAPY | Age: 82
End: 2022-10-18
Payer: MEDICARE

## 2022-10-18 DIAGNOSIS — S52.591A OTHER CLOSED FRACTURE OF DISTAL END OF RIGHT RADIUS, INITIAL ENCOUNTER: Primary | ICD-10-CM

## 2022-10-18 PROBLEM — S52.501A CLOSED FRACTURE OF RIGHT DISTAL RADIUS: Status: ACTIVE | Noted: 2022-10-18

## 2022-10-18 PROCEDURE — 97165 OT EVAL LOW COMPLEX 30 MIN: CPT | Performed by: OCCUPATIONAL THERAPIST

## 2022-10-18 NOTE — PROGRESS NOTES
OCCUPATIONAL THERAPY INITIAL EVALUATION    [address]  [facility]   Renata Ferrell [address] [place] [address]  Dept: [phone]  Loc: [phone]   Arabella Arnold OT Fax: [phone]    Date:  10/18/2022  Initial Evaluation Date: 10-18-22     Evaluating Therapist: Cullen Franks OT/L  [id number]    Patient Name:  Johan Joshi    :  1940    Restrictions/Precautions:   Activity as tolerated,  Low fall risk, Poor STM  Diagnosis:  S52.591A (ICD-10-CM) - Other closed fracture of distal end of right radius, initial encounter  Date of Surgery/Injury: R wrist ORIF 41    Insurance/Certification information:  Medicare/ [employer] [place]  Plan of care signed (Y/N): N  Visit# / total visits:     Referring Practitioner:  Dr Kwan Stoner Practitioner Orders: OT evaluate and treat    Assessment of current deficits   [] Functional mobility  [x] ADLs  [x] Strength   [] Cognition   [] Functional transfers   [x] IADLs  [] Safety Awareness  [] Endurance   [] Fine Motor Coordination  [] Balance  [] Vision/perception  [] Sensation    [] Gross Motor Coordination [x] ROM  [x] Pain   [x] Edema    [x] Scar Adhesion/Skin Integrity     OT PLAN OF CARE   OT POC based on physician orders, patient diagnosis and results of clinical assessment    Frequency/Duration: 2x/ week x 6 weeks  Specific OT Treatment to include:     [x] Instruction in HEP                   Modalities:  [x] Therapeutic Exercise        [x] Ultrasound               [] Electrical Stimulation/Attended  [x] PROM/Stretching                    [x] Fluidotherapy          [x]  Paraffin                   [x] AAROM  [x] AROM                 [] Iontophoresis:   [] Tendon Glides                                               [] Neuromuscular Re-Ed            [] ADL/IADL re-training    [x] Therapeutic Activity       [x] Pain Management with/without modalities PRN                 [x] Manual Therapy [] Splinting                      [x] Scar Management                   []Joint Protection/Training  []Ergonomics                             [] Joint Mobilization        [] Adaptive Equipment Assessment/Training                             [x] Manual Edema Mobilization   [] Myofascial Release                 [] Energy Conservation/Work Simplification  [] GM/FM Coordination       [] Safety retraining/education per  individual diagnosis/goals  [] Desensitization       Patient Specific Goal: pt wants to get better so she can get back to doing everything                             GOALS (Long term same as Short term):  1) Patient will demonstrate good understanding of home program (exercises/activities/diagnosis/prognosis/goals) with good accuracy. 2) Patient will demonstrate increased active/passive range of motion of their R wrist/ hand to [facility] for ADL/IADL completion. 3) Patient will demonstrate increased /pinch strength of at least 10 / 3 pinch pounds of their R hand. 4) Patient to report decreased pain in their affected R distal upper extremity from 8-9/10 to 3/10 or less with resistive functional use. 5) Patient will demonstrate a non-tender/non-adherent scar. 6) Patient will report ADL/ IADL  functions as Mod I/I using the R wrist/ hand including resuming regular bathing/ grooming routine and being able to completed light cooking/ cleaning.       Past Medical History:   Past Medical History:   Diagnosis Date    Asthma     Hernia     Hypertension      Past Surgical History:   Past Surgical History:   Procedure Laterality Date    BLADDER SURGERY      BREAST SURGERY Left     benign    DILATION AND CURETTAGE OF UTERUS      FRACTURE SURGERY      right arm    HYSTERECTOMY (CERVIX STATUS UNKNOWN)      KNEE CARTILAGE SURGERY      WRIST FRACTURE SURGERY Right 8/29/2022    RIGHT WRIST OPEN REDUCTION INTERNAL FIXATION VS. APPLICATION EXTERNAL FIXATOR DISTAL RADIUS FRACTURE performed by Vinicius Smith Maral Guillen, DO at [address]       Reason for Referral: Pt presents with a right wrist injury from a fall. Xrays showed:   Encounter Diagnosis   Name Primary? Other closed fracture of distal end of right radius, initial encounter Yes       Date of Procedure: 8/29/2022     Pre-Op Diagnosis: distal radius fracture, right, closed, initial encounter [S52.772M, S52.211J]        Procedure(s):  RIGHT WRIST OPEN REDUCTION INTERNAL FIXATION VS. APPLICATION EXTERNAL FIXATOR DISTAL RADIUS FRACTURE    Pt cc: decreased tolerance for lifting. Decreased wrist ROM and decreased ease with her daily tasks. Discoloration is present in the R hand with no significant temperature changes. The volar wrist incision is well healed but is tight in the surrounding tissues. Moderate swelling is present above the wrist but no swelling is present in the hand. Home Living: lives alone/ senior living  Prior Level of Function: Independent    Cognition:   Alert/Oriented x3, able to follow 1-2 step commands, has difficulties with STM     IADL STATUS:   Ind Mod I Min A Mod A Max A Dep Other   Homemaking Responsibility   x    Able to do light chores, needs help for moderate to heavy lifting   Shopping Responsibility:  x     Uses L>R   Mode of Transportation: car  x        Leisure & Hobbies: word finds  x        Work: retired       NA     Comments: Pt reports decreased ease with chores about her home. She tends to rely more heavily on her dominant left arm vs the injured right.     ADL STATUS:   Ind Mod I Min A Mod A Max A Dep Other   Feeding:  x     Diff cutting food   Grooming:    x    weekly   Bathing:       Sponge bathing   UE Dressing:  x        LE Dressing:  x     ^ time   Toileting:  x        Transfers: x             Pain Level: 8-9 on scale of 1-10, tight (pulling) and uncomfortable in the hand    UE Assessment: LHD     R Wrist AROM  10-18-22 ( all mvmt is painful)     Flexion  0-40     extension  0-53     RD  0-15     UD  0-25 R Supination  0-70     R pronation  WFL     Thumb radial ext  0-45     Thumb ABD  0-50       IP  0-50    Comments: Pt is able to make a full fist with intact opposition to each digit. Sensation: R  Able To Sense (Y) / Unable to Sense (N)  SEMMES-ANALISA Thumb 2nd Digit 3rd Digit  4th Digit  5th Digit    Normal Touch  Size: 2.83 x x x x x   Diminished Light Touch   Size: 3.61        Diminished Protective Sense  Size: 4.31        Loss of Protective Sense   Size: 4.56        Loss of Sensation  Size: 6.65          Dynamometer (setting 2):     Left: 30#      Right: 5# with pain      Pinch Meter:   Lateral: Left= 10.5#,Right= 3# with pain      9 Hole Peg Test:   Left: 27 sec   Right: 28 sec with thumb aches    QuickDASH Score: % disability TBA     Eval Complexity: Low  Profile and History- Interview, MD notes, op notes, xrays  Assessment of Occupational Performance and Identification of Deficits- 7 performance deficits   Clinical Decision Making- modifications required/ co-morbidity none    Rehab Potential:                                 [x] Good  [] Fair  [] Poor        Suggested Professional Referral:       [x] No  [] Yes:  Barriers to Goal Achievement[de-identified]          [x] No  [] Yes:  Domestic Concerns:                           [x] No  [] Yes:       Patient. Education:  [x] Plans/Goals, Risks/Benefits discussed  [] Home exercise program  Method of Education: [x] Verbal  [] Demo  [] Written  Comprehension of Education:  [x] Verbalizes understanding. [] Demonstrates understanding. [] Needs Review. [] Demonstrates/verbalizes understanding of HEP/Ed previously given.         Patient understands diagnosis/prognosis and consents to treatment, plan and goals: [x] Yes    [] No     Time In: 1005            Time Out: 1050                     Timed Code Treatment Minutes: 45 minutes      CODE  Minutes  Units   15320 OT Eval Low 45 1   54609 OT Eval Medium     62866 OT Eval High     06752 Fluidotherapy     53508 Manual [id number] Therapeutic Ex      Therapeutic Activity     67906 ADL/COMP Tech Train     75730 Neuromuscular Re-Ed     66607 OrthoManagementTraining     34987 Paraffin     22652 Electrical Stim - Attended     66672 Iontophoresis     01365 Ultrasound      Other                Electronically signed by: Eual Sicard, OT/L  [id number]      [id number] Certification / Comments      Frequency/Duration 2x / week for 12 visits. Certification period From: 10-18-22  To: 1-18-23     I have reviewed the Plan of Care established for skilled therapy services and certify that the services are required and that they will be provided while the patient is under my care. Physician's Comments/Revisions:           Physicians's Printed Name:  Dr Marly Clay                                   Physician's Signature:                                                               Date:      Please review Patient's OT evaluation and if you agree sign/date and fax back to us at our [facility] Fax: [phone].  Thank you for your referral!

## 2022-10-25 ENCOUNTER — TREATMENT (OUTPATIENT)
Dept: OCCUPATIONAL THERAPY | Age: 82
End: 2022-10-25
Payer: MEDICARE

## 2022-10-25 DIAGNOSIS — S52.591A OTHER CLOSED FRACTURE OF DISTAL END OF RIGHT RADIUS, INITIAL ENCOUNTER: Primary | ICD-10-CM

## 2022-10-25 PROCEDURE — 97110 THERAPEUTIC EXERCISES: CPT | Performed by: OCCUPATIONAL THERAPIST

## 2022-10-25 PROCEDURE — 97530 THERAPEUTIC ACTIVITIES: CPT | Performed by: OCCUPATIONAL THERAPIST

## 2022-10-25 PROCEDURE — 97140 MANUAL THERAPY 1/> REGIONS: CPT | Performed by: OCCUPATIONAL THERAPIST

## 2022-10-25 PROCEDURE — 97022 WHIRLPOOL THERAPY: CPT | Performed by: OCCUPATIONAL THERAPIST

## 2022-10-25 NOTE — PROGRESS NOTES
OCCUPATIONAL THERAPY PROGRESS NOTE    Date:  10/25/2022  Initial Evaluation Date: 10/18/22      Patient Name:  Eulalia Dawn    :  1940    Restrictions/Precautions:   Activity as tolerated,  Low fall risk, Poor STM  Diagnosis:  S52.591A (ICD-10-CM) - Other closed fracture of distal end of right radius, initial encounter                      Date of Surgery/Injury: R wrist ORIF      Insurance/Certification information:  Medicare/ [employer] [place]  Plan of care signed (Y/N): N  Visit# / total visits:      Referring Practitioner:  Dr Pratik Lamb Practitioner Orders: OT evaluate and treat     Assessment of current deficits   [] Functional mobility             [x] ADLs           [x] Strength                  [] Cognition   [] Functional transfers           [x] IADLs          [] Safety Awareness  [] Endurance   [] Fine Motor Coordination    [] Balance      [] Vision/perception    [] Sensation     [] Gross Motor Coordination [x] ROM           [x] Pain                        [x] Edema          [x] Scar Adhesion/Skin Integrity      OT PLAN OF CARE   OT POC based on physician orders, patient diagnosis and results of clinical assessment     Frequency/Duration: 2x/ week x 6 weeks  Specific OT Treatment to include:      [x] Instruction in HEP                   Modalities:  [x] Therapeutic Exercise                 [x] Ultrasound               [] Electrical Stimulation/Attended  [x] PROM/Stretching                    [x] Fluidotherapy          [x]  Paraffin                   [x] AAROM  [x] AROM                 [] Iontophoresis:   [] Tendon Glides                                               [] Neuromuscular Re-Ed            [] ADL/IADL re-training    [x] Therapeutic Activity                  [x] Pain Management with/without modalities PRN                 [x] Manual Therapy                      [] Splinting                                   [x] Scar Management                   []Joint Protection/Training  []Ergonomics                             [] Joint Mobilization                      [] Adaptive Equipment Assessment/Training                             [x] Manual Edema Mobilization   [] Myofascial Release                 [] Energy Conservation/Work Simplification  [] GM/FM Coordination                [] Safety retraining/education per  individual diagnosis/goals  [] Desensitization        Patient Specific Goal: pt wants to get better so she can get back to doing everything                             GOALS (Long term same as Short term):  1) Patient will demonstrate good understanding of home program (exercises/activities/diagnosis/prognosis/goals) with good accuracy. 2) Patient will demonstrate increased active/passive range of motion of their R wrist/ hand to [facility] for ADL/IADL completion. 3) Patient will demonstrate increased /pinch strength of at least 10 / 3 pinch pounds of their R hand. 4) Patient to report decreased pain in their affected R distal upper extremity from 8-9/10 to 3/10 or less with resistive functional use. 5) Patient will demonstrate a non-tender/non-adherent scar. 6) Patient will report ADL/ IADL  functions as Mod I/I using the R wrist/ hand including resuming regular bathing/ grooming routine and being able to completed light cooking/ cleaning. Pain Level: mild soreness at the wrist with motion    Subjective: \"wow I loved that machine . \"     Objective:  Updated POC to be completed by 10th visit or 11/18/22.     INTERVENTION: COMPLETED: SPECIFICS/COMMENTS:   Modality:     fluidotherapy x -10 mins to increase soft tissue mobility as well as decrease pain, AROM encouraged throughout modality         AROM:     wrist x -all planes  -wristocizer 6 reps   digital x -opposition with pennies  -tendon glides  -in hand manipulation with pennies  -towel scrunches   AAROM:               PROM/Stretching:     wrist x -all planes- gentle   thumb x -all planes digital x -all planes   Scar Mass/Edema Control:     Soft tissue massage x -to increase pain free ROM and increase soft tissue mobility   Retrograde massage x -to decrease edema    Strengthening:               Other:                 Assessment/Comments: Pt is making Fair + progress toward stated plan of care. Pt tolerated session well with increased tolerance to ROM exercises and PROM. Pt encouraged by increased ROM at the end of session with pain decreased after fluidotherapy modality. Will increase as tolerated. -Rehab Potential: Good  -Requires OT Follow Up: Yes    Time In:1:00            Time Out: 2:00             Visit #: 2    Treatment Charges: Mins Units   Modalities-fluido 15 1   Ther Exercise 15 1   Manual Therapy 15 1   Thera Activities 15 1   ADL/Home Mgt      Neuro Re-education     Gait Training     Group Therapy     Non-Billable Service Time     Other     Total Time/Units 60 4       -Response to Treatment: tolerated session well  Goals: Goals for pt can be see on initial eval    Plan:   [x]  Continues Plan of care: Treatment covered based on POC and graduated to patient's progress. Pt education continues at each visit to obtain maximum benefits from skilled OT intervention. []  Alter Plan of care:   []  Discharge:       [address] [facility], OTR/L #[id number]

## 2022-10-27 ENCOUNTER — TREATMENT (OUTPATIENT)
Dept: OCCUPATIONAL THERAPY | Age: 82
End: 2022-10-27
Payer: MEDICARE

## 2022-10-27 DIAGNOSIS — S52.591A OTHER CLOSED FRACTURE OF DISTAL END OF RIGHT RADIUS, INITIAL ENCOUNTER: Primary | ICD-10-CM

## 2022-10-27 PROCEDURE — 97140 MANUAL THERAPY 1/> REGIONS: CPT | Performed by: OCCUPATIONAL THERAPY ASSISTANT

## 2022-10-27 PROCEDURE — 97530 THERAPEUTIC ACTIVITIES: CPT | Performed by: OCCUPATIONAL THERAPY ASSISTANT

## 2022-10-27 PROCEDURE — 97018 PARAFFIN BATH THERAPY: CPT | Performed by: OCCUPATIONAL THERAPY ASSISTANT

## 2022-10-27 PROCEDURE — 97110 THERAPEUTIC EXERCISES: CPT | Performed by: OCCUPATIONAL THERAPY ASSISTANT

## 2022-10-27 NOTE — PROGRESS NOTES
OCCUPATIONAL THERAPY DAILY NOTE  [address] THERAPY   [address] [place] [address]  Dept: [phone]: [phone]   Rustam Coyne OT Fax: [phone]    Date:  10/27/2022  Initial Evaluation Date: 10/18/22      Patient Name:  Reno Quinonez    :  1940    Restrictions/Precautions:   Activity as tolerated,  Low fall risk, Poor STM  Diagnosis:  S52.591A (ICD-10-CM) - Other closed fracture of distal end of right radius, initial encounter                        Date of Surgery/Injury: R wrist ORIF      Insurance/Certification information:  Medicare/ [facility] [place]  Plan of care signed (Y/N): N  Visit# / total visits: 3 / 12     Referring Practitioner:  Dr Jorden Justin Practitioner Orders: OT evaluate and treat     Assessment of current deficits   [] Functional mobility             [x] ADLs           [x] Strength                  [] Cognition   [] Functional transfers           [x] IADLs          [] Safety Awareness  [] Endurance   [] Fine Motor Coordination    [] Balance      [] Vision/perception    [] Sensation     [] Gross Motor Coordination [x] ROM           [x] Pain                        [x] Edema          [x] Scar Adhesion/Skin Integrity      OT PLAN OF CARE   OT POC based on physician orders, patient diagnosis and results of clinical assessment     Frequency/Duration: 2x/ week x 6 weeks  Specific OT Treatment to include:      [x] Instruction in HEP                   Modalities:  [x] Therapeutic Exercise                 [x] Ultrasound               [] Electrical Stimulation/Attended  [x] PROM/Stretching                    [x] Fluidotherapy          [x]  Paraffin                   [x] AAROM  [x] AROM                 [] Iontophoresis:   [] Tendon Glides                                               [] Neuromuscular Re-Ed            [] ADL/IADL re-training    [x] Therapeutic Activity                  [x] Pain Management with/without modalities PRN                 [x] Manual Therapy                      [] Splinting                                   [x] Scar Management                   []Joint Protection/Training  []Ergonomics                             [] Joint Mobilization                      [] Adaptive Equipment Assessment/Training                             [x] Manual Edema Mobilization   [] Myofascial Release                 [] Energy Conservation/Work Simplification  [] GM/FM Coordination                [] Safety retraining/education per  individual diagnosis/goals  [] Desensitization        Patient Specific Goal: pt wants to get better so she can get back to doing everything                             GOALS (Long term same as Short term):  1) Patient will demonstrate good understanding of home program (exercises/activities/diagnosis/prognosis/goals) with good accuracy. 2) Patient will demonstrate increased active/passive range of motion of their R wrist/ [facility] to [facility] for [facility]/IADL completion. 3) Patient will demonstrate increased /pinch strength of at least 10 / 3 pinch pounds of their R hand. 4) Patient to report decreased pain in their affected R distal upper extremity from 8-9/10 to 3/10 or less with resistive functional use. 5) Patient will demonstrate a non-tender/non-adherent scar. 6) Patient will report ADL/ IADL  functions as Mod I/I using the R wrist/ hand including resuming regular bathing/ grooming routine and being able to completed light cooking/ cleaning. Pain Level: mild soreness at the wrist with motion    Subjective: \"I am able to carry my purse and some grocery bags. \"      Objective:  Updated POC to be completed by 10th visit or 11/18/22.     INTERVENTION: COMPLETED: SPECIFICS/COMMENTS:   Modality:     fluidotherapy x -10 mins to increase soft tissue mobility as well as decrease pain, AROM encouraged throughout modality    Paraffin bath  x -10 mins    AROM:     wrist X    x -all planes  -jux-a-cisor 6 reps  -blue ball ex's- wrist flexion/extension and RD/UD- 15 reps each. digital X      X  X      x -opposition with pennies  -tendon glides  -in hand manipulation with pennies  -alt prehension with small beads. - in hand manipulation with small beads.   -towel scrunches  -exercise spheres- CW-20 reps CCW- 10 reps- more difficult. AAROM:               PROM/Stretching:     wrist x -all planes- gentle   thumb x -all planes   digital x -all planes   Scar Mass/Edema Control:     Soft tissue massage x -to increase pain free ROM and increase soft tissue mobility   Retrograde massage x -to decrease edema    Strengthening:               Other:                 Assessment/Comments: Pt is making Fair + progress toward stated plan of care. Pt. Tolerated all exercises and stretches today. Pt. Did present with moderate edema at wrist today. Retrograde massage performed. Will monitor and advance as tolerated. -Rehab Potential: Good  -Requires OT Follow Up: Yes    Time In: 1305          Time Out: 1400            Visit #: 3    Treatment Charges: Mins Units   Modalities-paraffin 10 1   Ther Exercise 15 1   Manual Therapy 15 1   Thera Activities 15 1   ADL/Home Mgt      Neuro Re-education     Gait Training     Group Therapy     Non-Billable Service Time     Other     Total Time/Units 55 4       -Response to Treatment: tolerated session well  Goals: Goals for pt can be see on initial eval 10-18-22    Plan:   [x]  Continues Plan of care: Treatment covered based on POC and graduated to patient's progress. Pt education continues at each visit to obtain maximum benefits from skilled OT intervention.   []  [address] [facility] of care:   []  Discharge:      HELENA Maki/L [id number]

## 2022-11-01 ENCOUNTER — TREATMENT (OUTPATIENT)
Dept: OCCUPATIONAL THERAPY | Age: 82
End: 2022-11-01
Payer: MEDICARE

## 2022-11-01 DIAGNOSIS — S52.591A OTHER CLOSED FRACTURE OF DISTAL END OF RIGHT RADIUS, INITIAL ENCOUNTER: Primary | ICD-10-CM

## 2022-11-01 PROCEDURE — 97110 THERAPEUTIC EXERCISES: CPT | Performed by: OCCUPATIONAL THERAPY ASSISTANT

## 2022-11-01 PROCEDURE — 97530 THERAPEUTIC ACTIVITIES: CPT | Performed by: OCCUPATIONAL THERAPY ASSISTANT

## 2022-11-01 PROCEDURE — 97140 MANUAL THERAPY 1/> REGIONS: CPT | Performed by: OCCUPATIONAL THERAPY ASSISTANT

## 2022-11-01 PROCEDURE — 97022 WHIRLPOOL THERAPY: CPT | Performed by: OCCUPATIONAL THERAPY ASSISTANT

## 2022-11-03 ENCOUNTER — NURSE ONLY (OUTPATIENT)
Dept: FAMILY MEDICINE CLINIC | Age: 82
End: 2022-11-03
Payer: MEDICARE

## 2022-11-03 DIAGNOSIS — Z23 FLU VACCINE NEED: Primary | ICD-10-CM

## 2022-11-03 PROCEDURE — 90694 VACC AIIV4 NO PRSRV 0.5ML IM: CPT | Performed by: NURSE PRACTITIONER

## 2022-11-03 PROCEDURE — G0008 ADMIN INFLUENZA VIRUS VAC: HCPCS | Performed by: NURSE PRACTITIONER

## 2022-11-03 PROCEDURE — 99999 PR OFFICE/OUTPT VISIT,PROCEDURE ONLY: CPT | Performed by: NURSE PRACTITIONER

## 2022-11-04 ENCOUNTER — TREATMENT (OUTPATIENT)
Dept: OCCUPATIONAL THERAPY | Age: 82
End: 2022-11-04
Payer: MEDICARE

## 2022-11-04 DIAGNOSIS — S52.591A OTHER CLOSED FRACTURE OF DISTAL END OF RIGHT RADIUS, INITIAL ENCOUNTER: Primary | ICD-10-CM

## 2022-11-04 PROCEDURE — 97530 THERAPEUTIC ACTIVITIES: CPT | Performed by: OCCUPATIONAL THERAPIST

## 2022-11-04 PROCEDURE — 97140 MANUAL THERAPY 1/> REGIONS: CPT | Performed by: OCCUPATIONAL THERAPIST

## 2022-11-04 PROCEDURE — 97022 WHIRLPOOL THERAPY: CPT | Performed by: OCCUPATIONAL THERAPIST

## 2022-11-04 PROCEDURE — 97110 THERAPEUTIC EXERCISES: CPT | Performed by: OCCUPATIONAL THERAPIST

## 2022-11-04 NOTE — PROGRESS NOTES
OCCUPATIONAL THERAPY DAILY NOTE  [address] THERAPY   [address] [place] [address]  Dept: [address] [place] [address] [address]: [phone]   Mica Gamino OT Fax: [phone]    Date:  2022  Initial Evaluation Date: 10/18/22      Patient Name:  Adrian Timmons    :  1940    Restrictions/Precautions:   Activity as tolerated,  Low fall risk, Poor STM  Diagnosis:  S52.591A (ICD-10-CM) - Other closed fracture of distal end of right radius, initial encounter                        Date of Surgery/Injury: R wrist ORIF      Insurance/Certification information:  Medicare/ [facility] [place]  Plan of care signed (Y/N): N  Visit# / total visits:      Referring Practitioner:  Dr Jessica Wynn Practitioner Orders: OT evaluate and treat     Assessment of current deficits   [] Functional mobility             [x] ADLs           [x] Strength                  [] Cognition   [] Functional transfers           [x] IADLs          [] Safety Awareness  [] Endurance   [] Fine Motor Coordination    [] Balance      [] Vision/perception    [] Sensation     [] Gross Motor Coordination [x] ROM           [x] Pain                        [x] Edema          [x] Scar Adhesion/Skin Integrity      OT PLAN OF CARE   OT POC based on physician orders, patient diagnosis and results of clinical assessment     Frequency/Duration: 2x/ week x 6 weeks  Specific OT Treatment to include:      [x] Instruction in HEP                   Modalities:  [x] Therapeutic Exercise                 [x] Ultrasound               [] Electrical Stimulation/Attended  [x] PROM/Stretching                    [x] Fluidotherapy          [x]  Paraffin                   [x] AAROM  [x] AROM                 [] Iontophoresis:   [] Tendon Glides                                               [] Neuromuscular Re-Ed            [] ADL/IADL re-training    [x] Therapeutic Activity                  [x] Pain Management with/without modalities PRN                 [x] Manual Therapy                      [] Splinting                                   [x] Scar Management                   []Joint Protection/Training  []Ergonomics                             [] Joint Mobilization                      [] Adaptive Equipment Assessment/Training                             [x] Manual Edema Mobilization   [] Myofascial Release                 [] Energy Conservation/Work Simplification  [] GM/FM Coordination                [] Safety retraining/education per  individual diagnosis/goals  [] Desensitization        Patient Specific Goal: pt wants to get better so she can get back to doing everything                             GOALS (Long term same as Short term):  1) Patient will demonstrate good understanding of home program (exercises/activities/diagnosis/prognosis/goals) with good accuracy. 2) Patient will demonstrate increased active/passive range of motion of their R wrist/ hand to [facility] for ADL/IADL completion. 3) Patient will demonstrate increased /pinch strength of at least 10 / 3 pinch pounds of their R hand. 4) Patient to report decreased pain in their affected R distal upper extremity from 8-9/10 to 3/10 or less with resistive functional use. 5) Patient will demonstrate a non-tender/non-adherent scar. 6) Patient will report ADL/ IADL  functions as Mod I/I using the R wrist/ hand including resuming regular bathing/ grooming routine and being able to completed light cooking/ cleaning. Pain Level: mild soreness at the wrist with motion    Subjective: \"this scar has never hurt\"       Objective:  Updated POC to be completed by 10th visit or 11/18/22.     INTERVENTION: COMPLETED: SPECIFICS/COMMENTS:   Modality:     fluidotherapy x -10 mins to increase soft tissue mobility as well as decrease pain, AROM encouraged throughout modality    Paraffin bath   -10 mins    AROM:     wrist X  x  x -all planes  -jux-a-cisor 6 reps  -blue ball ex's- wrist flexion/extension and RD/UD- 15 reps each. digital X    x  X  X         -opposition with pennies  -tendon glides  -in hand manipulation with pennies  -alt prehension with small beads  - in hand manipulation with small beads ^d to buttons.   -towel scrunches  -exercise spheres- CW-20 reps CCW- 10 reps- more difficult. -\"bunchems\" deconstruction   AAROM:               PROM/Stretching:     wrist x -all planes- gentle   thumb x -all planes   digital x -all planes   Scar Mass/Edema Control:     Soft tissue massage x -to increase pain free ROM and increase soft tissue mobility   Retrograde massage x -to decrease edema    Strengthening:               Other:                 Assessment/Comments: Pt is making Fair + progress toward stated plan of care. Pt tolerated session well, increased tolerance to prehension tasks. Minimal increase in discomfort at the end of the exercises. Will increase as tolerated. -Rehab Potential: Good  -Requires OT Follow Up: Yes    Time In: 1305          Time Out: 1400            Visit #: 5    Treatment Charges: Mins Units   Modalities-fluido 10 1   Ther Exercise 15 1   Manual Therapy 15 1   Thera Activities 15 1   ADL/Home Mgt      Neuro Re-education     Gait Training     Group Therapy     Non-Billable Service Time     Other     Total Time/Units 55 4       -Response to Treatment: tolerated session well  Goals: Goals for pt can be see on initial eval 10-18-22    Plan:   [x]  Continues Plan of care: Treatment covered based on POC and graduated to patient's progress. Pt education continues at each visit to obtain maximum benefits from skilled OT intervention. []  Alter Plan of care:   []  Discharge:       [address] [facility], OTR/L #[id number]

## 2022-11-08 ENCOUNTER — TREATMENT (OUTPATIENT)
Dept: OCCUPATIONAL THERAPY | Age: 82
End: 2022-11-08
Payer: MEDICARE

## 2022-11-08 DIAGNOSIS — S52.591A OTHER CLOSED FRACTURE OF DISTAL END OF RIGHT RADIUS, INITIAL ENCOUNTER: Primary | ICD-10-CM

## 2022-11-08 PROCEDURE — 97110 THERAPEUTIC EXERCISES: CPT | Performed by: OCCUPATIONAL THERAPIST

## 2022-11-08 PROCEDURE — 97140 MANUAL THERAPY 1/> REGIONS: CPT | Performed by: OCCUPATIONAL THERAPIST

## 2022-11-08 PROCEDURE — 97530 THERAPEUTIC ACTIVITIES: CPT | Performed by: OCCUPATIONAL THERAPIST

## 2022-11-08 PROCEDURE — 97022 WHIRLPOOL THERAPY: CPT | Performed by: OCCUPATIONAL THERAPIST

## 2022-11-10 ENCOUNTER — TREATMENT (OUTPATIENT)
Dept: OCCUPATIONAL THERAPY | Age: 82
End: 2022-11-10
Payer: MEDICARE

## 2022-11-10 DIAGNOSIS — S52.591A OTHER CLOSED FRACTURE OF DISTAL END OF RIGHT RADIUS, INITIAL ENCOUNTER: Primary | ICD-10-CM

## 2022-11-10 PROCEDURE — 97140 MANUAL THERAPY 1/> REGIONS: CPT | Performed by: OCCUPATIONAL THERAPY ASSISTANT

## 2022-11-10 PROCEDURE — 97022 WHIRLPOOL THERAPY: CPT | Performed by: OCCUPATIONAL THERAPY ASSISTANT

## 2022-11-10 PROCEDURE — 97530 THERAPEUTIC ACTIVITIES: CPT | Performed by: OCCUPATIONAL THERAPY ASSISTANT

## 2022-11-10 PROCEDURE — 97110 THERAPEUTIC EXERCISES: CPT | Performed by: OCCUPATIONAL THERAPY ASSISTANT

## 2022-11-10 NOTE — PROGRESS NOTES
OCCUPATIONAL THERAPY DAILY NOTE  [address] THERAPY   [address] [place] [address]  Dept: [address] [place] [address] [address]: [phone]   Lesly Garrett OT Fax: [phone]    Date:  11/10/2022  Initial Evaluation Date: 10/18/22      Patient Name:  John Delaney    :  1940    Restrictions/Precautions:   Activity as tolerated,  Low fall risk, Poor STM  Diagnosis:  S52.591A (ICD-10-CM) - Other closed fracture of distal end of right radius, initial encounter                        Date of Surgery/Injury: R wrist ORIF 2/93/10     Insurance/Certification information:  Medicare/ [facility] [place]  Plan of care signed (Y/N): N  Visit# / total visits:      Referring Practitioner:  Dr Apurva Lechuga Practitioner Orders: OT evaluate and treat     Assessment of current deficits   [] Functional mobility             [x] ADLs           [x] Strength                  [] Cognition   [] Functional transfers           [x] IADLs          [] Safety Awareness  [] Endurance   [] Fine Motor Coordination    [] Balance      [] Vision/perception    [] Sensation     [] Gross Motor Coordination [x] ROM           [x] Pain                        [x] Edema          [x] Scar Adhesion/Skin Integrity      OT PLAN OF CARE   OT POC based on physician orders, patient diagnosis and results of clinical assessment     Frequency/Duration: 2x/ week x 6 weeks  Specific OT Treatment to include:      [x] Instruction in HEP                   Modalities:  [x] Therapeutic Exercise                 [x] Ultrasound               [] Electrical Stimulation/Attended  [x] PROM/Stretching                    [x] Fluidotherapy          [x]  Paraffin                   [x] AAROM  [x] AROM                 [] Iontophoresis:   [] Tendon Glides                                               [] Neuromuscular Re-Ed            [] ADL/IADL re-training    [x] Therapeutic Activity                  [x] Pain Management with/without modalities PRN                 [x] Manual Therapy                      [] Splinting                                   [x] Scar Management                   []Joint Protection/Training  []Ergonomics                             [] Joint Mobilization                      [] Adaptive Equipment Assessment/Training                             [x] Manual Edema Mobilization   [] Myofascial Release                 [] Energy Conservation/Work Simplification  [] GM/FM Coordination                [] Safety retraining/education per  individual diagnosis/goals  [] Desensitization        Patient Specific Goal: pt wants to get better so she can get back to doing everything                             GOALS (Long term same as Short term):  1) Patient will demonstrate good understanding of home program (exercises/activities/diagnosis/prognosis/goals) with good accuracy. 2) Patient will demonstrate increased active/passive range of motion of their R wrist/ hand to [facility] for ADL/IADL completion. 3) Patient will demonstrate increased /pinch strength of at least 10 / 3 pinch pounds of their R hand. 4) Patient to report decreased pain in their affected R distal upper extremity from 8-9/10 to 3/10 or less with resistive functional use. 5) Patient will demonstrate a non-tender/non-adherent scar. 6) Patient will report ADL/ IADL  functions as Mod I/I using the R wrist/ hand including resuming regular bathing/ grooming routine and being able to completed light cooking/ cleaning. Pain Level: mild soreness at the wrist with motion    Subjective: \"I not having any pain. I started lifting 3# wt's again\"      Objective:  Updated POC to be completed by 10th visit or 11/18/22.     INTERVENTION: COMPLETED: SPECIFICS/COMMENTS:   Modality:     fluidotherapy x -10 mins to increase soft tissue mobility as well as decrease pain, AROM encouraged throughout modality    Paraffin bath   -10 mins    AROM:     wrist X  x  x -all planes  -jux-a-cisor 6 reps  -blue ball ex's- wrist flexion/extension and RD/UD- 15 reps each. digital X    x          x      x -opposition with pennies  -tendon glides  -in hand manipulation with pennies  -alt prehension with small beads  - in hand manipulation with small beads ^d to buttons.   -towel scrunches  -exercise spheres- CW-20 reps CCW- 10 reps- more difficult. -\"bunchems\" deconstruction  -Purdue pegboard ax with alt prehension   AAROM:               PROM/Stretching:     wrist x -all planes- gentle   thumb x -all planes   digital x -all planes   Scar Mass/Edema Control:     Soft tissue massage x -to increase pain free ROM and increase soft tissue mobility   Retrograde massage x -to decrease edema    Strengthening:     Light digital  x -yellow-light resistive putty, roll/pinch manipulation, gripping        Other:                 Assessment/Comments: Pt is making Fair + progress toward stated plan of care. Pt tolerated all exercises and stretches today. Pt. Continue to have minimal to no pain with activity. Will continue to advance as tolerated. -Rehab Potential: Good  -Requires OT Follow Up: Yes    Time In: 1305          Time Out: 1400            Visit #: 7    Treatment Charges: Mins Units   Modalities-fluido 10 1   Ther Exercise 15 1   Manual Therapy 15 1   Thera Activities 15 1   ADL/Home Mgt      Neuro Re-education     Gait Training     Group Therapy     Non-Billable Service Time     Other     Total Time/Units 55 4       -Response to Treatment: tolerated session well  Goals: Goals for pt can be see on initial eval 10-18-22    Plan:   [x]  Continues Plan of care: Focus on AROM and FMC of RUE next session. Treatment covered based on POC and graduated to patient's progress. Pt education continues at each visit to obtain maximum benefits from skilled OT intervention.   []  [address] [facility] of care:   []  Discharge:      HELENA Price/RADAMES [id number]

## 2022-11-15 ENCOUNTER — TREATMENT (OUTPATIENT)
Dept: OCCUPATIONAL THERAPY | Age: 82
End: 2022-11-15
Payer: MEDICARE

## 2022-11-15 DIAGNOSIS — S52.591A OTHER CLOSED FRACTURE OF DISTAL END OF RIGHT RADIUS, INITIAL ENCOUNTER: Primary | ICD-10-CM

## 2022-11-15 PROCEDURE — 97530 THERAPEUTIC ACTIVITIES: CPT | Performed by: OCCUPATIONAL THERAPY ASSISTANT

## 2022-11-15 PROCEDURE — 97110 THERAPEUTIC EXERCISES: CPT | Performed by: OCCUPATIONAL THERAPY ASSISTANT

## 2022-11-15 PROCEDURE — 97022 WHIRLPOOL THERAPY: CPT | Performed by: OCCUPATIONAL THERAPY ASSISTANT

## 2022-11-15 PROCEDURE — 97140 MANUAL THERAPY 1/> REGIONS: CPT | Performed by: OCCUPATIONAL THERAPY ASSISTANT

## 2022-11-15 NOTE — PROGRESS NOTES
OCCUPATIONAL THERAPY DAILY NOTE  [address] THERAPY   [address] [place] [address]  Dept: [address] [place] [address] [address]: [phone]   Cris Hoyos OT Fax: [phone]    Date:  11/15/2022  Initial Evaluation Date: 10/18/22      Patient Name:  Freddy Peralta    :  1940    Restrictions/Precautions:   Activity as tolerated,  Low fall risk, Poor STM  Diagnosis:  S52.591A (ICD-10-CM) - Other closed fracture of distal end of right radius, initial encounter                        Date of Surgery/Injury: R wrist ORIF 3/32/63     Insurance/Certification information:  Medicare/ [facility] [place]  Plan of care signed (Y/N): N  Visit# / total visits:      Referring Practitioner:  Dr Jacqueline Du  Specific Practitioner Orders: OT evaluate and treat     Assessment of current deficits   [] Functional mobility             [x] ADLs           [x] Strength                  [] Cognition   [] Functional transfers           [x] IADLs          [] Safety Awareness  [] Endurance   [] Fine Motor Coordination    [] Balance      [] Vision/perception    [] Sensation     [] Gross Motor Coordination [x] ROM           [x] Pain                        [x] Edema          [x] Scar Adhesion/Skin Integrity      OT PLAN OF CARE   OT POC based on physician orders, patient diagnosis and results of clinical assessment     Frequency/Duration: 2x/ week x 6 weeks  Specific OT Treatment to include:      [x] Instruction in HEP                   Modalities:  [x] Therapeutic Exercise                 [x] Ultrasound               [] Electrical Stimulation/Attended  [x] PROM/Stretching                    [x] Fluidotherapy          [x]  Paraffin                   [x] AAROM  [x] AROM                 [] Iontophoresis:   [] Tendon Glides                                               [] Neuromuscular Re-Ed            [] ADL/IADL re-training    [x] Therapeutic Activity                  [x] Pain Management with/without modalities PRN                 [x] Manual Therapy                      [] Splinting                                   [x] Scar Management                   []Joint Protection/Training  []Ergonomics                             [] Joint Mobilization                      [] Adaptive Equipment Assessment/Training                             [x] Manual Edema Mobilization   [] Myofascial Release                 [] Energy Conservation/Work Simplification  [] GM/FM Coordination                [] Safety retraining/education per  individual diagnosis/goals  [] Desensitization        Patient Specific Goal: pt wants to get better so she can get back to doing everything                             GOALS (Long term same as Short term):  1) Patient will demonstrate good understanding of home program (exercises/activities/diagnosis/prognosis/goals) with good accuracy. 2) Patient will demonstrate increased active/passive range of motion of their R wrist/ hand to [facility] for ADL/IADL completion. 3) Patient will demonstrate increased /pinch strength of at least 10 / 3 pinch pounds of their R hand. 4) Patient to report decreased pain in their affected R distal upper extremity from 8-9/10 to 3/10 or less with resistive functional use. 5) Patient will demonstrate a non-tender/non-adherent scar. 6) Patient will report ADL/ IADL  functions as Mod I/I using the R wrist/ hand including resuming regular bathing/ grooming routine and being able to completed light cooking/ cleaning. Pain Level: mild soreness at the wrist with motion    Subjective: \"I am able to lift heavier things now like a 1/2 gallon of milk. \"      Objective:  Updated POC to be completed by 10th visit or 11/18/22.     INTERVENTION: COMPLETED: SPECIFICS/COMMENTS:   Modality:     fluidotherapy x -10 mins to increase soft tissue mobility as well as decrease pain, AROM encouraged throughout modality    Paraffin bath   -10 mins    AROM: Wrist/forearm X  x      x -all planes  -jux-a-cisor 6 reps  -blue ball ex's- wrist flexion/extension and RD/UD- 15 reps each. -Green therabar- pronation/supination and RD/UD- 15 reps each. digital               x    x   -opposition with pennies  -tendon glides  -in hand manipulation with pennies  -alt prehension with small beads  - in hand manipulation with small beads ^d to buttons.   -towel scrunches  -exercise spheres- CW-20 reps CCW- 10 reps- more difficult. -\"bunchems\" deconstruction  -Purdue pegboard ax with alt prehension   AAROM:               PROM/Stretching:     wrist x -all planes- gentle   thumb x -all planes   digital x -all planes   Scar Mass/Edema Control:     Soft tissue massage x -to increase pain free ROM and increase soft tissue mobility   Retrograde massage x -to decrease edema    Strengthening:     Light digital  x -yellow-light resistive putty, roll/pinch manipulation, gripping        Other:                 Assessment/Comments: Pt is making Fair + progress toward stated plan of care. Pt tolerated all exercises and stretches today. Pt's wrist flexion has improved from 40 degrees on eval to 50 degrees today. All other AROM of wrist has improved significantly since eval.  Will re-eval next session.     -Rehab Potential: Good  -Requires OT Follow Up: Yes    Time In: 1305          Time Out: 1400            Visit #: 8    Treatment Charges: Mins Units   Modalities-fluido 10 1   Ther Exercise 15 1   Manual Therapy 15 1   Thera Activities 15 1   ADL/Home Mgt      Neuro Re-education     Gait Training     Group Therapy     Non-Billable Service Time     Other     Total Time/Units 55 4       -Response to Treatment: tolerated session well  Goals: Goals for pt can be see on initial eval 10-18-22    Plan:   [x]  Continues Plan of care: Focus on AROM and FMC of RUE next session. Treatment covered based on POC and graduated to patient's progress.  Pt education continues at each visit to obtain maximum benefits from skilled OT intervention.   []  [address] [facility] of care:   []  Discharge:      Law Callahan, HELENA/RADAMES [id number]

## 2022-11-17 ENCOUNTER — TREATMENT (OUTPATIENT)
Dept: OCCUPATIONAL THERAPY | Age: 82
End: 2022-11-17
Payer: MEDICARE

## 2022-11-17 DIAGNOSIS — S52.591A OTHER CLOSED FRACTURE OF DISTAL END OF RIGHT RADIUS, INITIAL ENCOUNTER: Primary | ICD-10-CM

## 2022-11-17 PROCEDURE — 97140 MANUAL THERAPY 1/> REGIONS: CPT | Performed by: OCCUPATIONAL THERAPIST

## 2022-11-17 PROCEDURE — 97022 WHIRLPOOL THERAPY: CPT | Performed by: OCCUPATIONAL THERAPIST

## 2022-11-17 PROCEDURE — 97530 THERAPEUTIC ACTIVITIES: CPT | Performed by: OCCUPATIONAL THERAPIST

## 2022-11-17 PROCEDURE — 97110 THERAPEUTIC EXERCISES: CPT | Performed by: OCCUPATIONAL THERAPIST

## 2022-11-18 ENCOUNTER — OFFICE VISIT (OUTPATIENT)
Dept: FAMILY MEDICINE CLINIC | Age: 82
End: 2022-11-18
Payer: MEDICARE

## 2022-11-18 ENCOUNTER — HOSPITAL ENCOUNTER (OUTPATIENT)
Dept: GENERAL RADIOLOGY | Age: 82
Discharge: HOME | End: 2022-11-20
Payer: MEDICARE

## 2022-11-18 ENCOUNTER — HOSPITAL ENCOUNTER (OUTPATIENT)
Age: 82
Discharge: HOME | End: 2022-11-20
Payer: MEDICARE

## 2022-11-18 VITALS
WEIGHT: 182.6 LBS | RESPIRATION RATE: 16 BRPM | SYSTOLIC BLOOD PRESSURE: 120 MMHG | BODY MASS INDEX: 30.42 KG/M2 | TEMPERATURE: 97.4 F | DIASTOLIC BLOOD PRESSURE: 58 MMHG | HEIGHT: 65 IN | OXYGEN SATURATION: 99 % | HEART RATE: 75 BPM

## 2022-11-18 DIAGNOSIS — M25.561 CHRONIC PAIN OF RIGHT KNEE: ICD-10-CM

## 2022-11-18 DIAGNOSIS — G89.29 CHRONIC PAIN OF RIGHT KNEE: ICD-10-CM
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
## 2022-11-18 DIAGNOSIS — M25.561 CHRONIC PAIN OF RIGHT KNEE: Primary | ICD-10-CM

## 2022-11-18 DIAGNOSIS — M25.551 RIGHT HIP PAIN: ICD-10-CM

## 2022-11-18 DIAGNOSIS — G89.29 CHRONIC PAIN OF RIGHT KNEE: Primary | ICD-10-CM

## 2022-11-18 DIAGNOSIS — M16.11 OSTEOARTHRITIS OF RIGHT HIP, UNSPECIFIED OSTEOARTHRITIS TYPE: ICD-10-CM

## 2022-11-18 PROCEDURE — 3074F SYST BP LT 130 MM HG: CPT | Performed by: NURSE PRACTITIONER

## 2022-11-18 PROCEDURE — 3078F DIAST BP <80 MM HG: CPT | Performed by: NURSE PRACTITIONER

## 2022-11-18 PROCEDURE — 73562 X-RAY EXAM OF KNEE 3: CPT

## 2022-11-18 PROCEDURE — 99213 OFFICE O/P EST LOW 20 MIN: CPT | Performed by: NURSE PRACTITIONER

## 2022-11-18 PROCEDURE — G8417 CALC BMI ABV UP PARAM F/U: HCPCS | Performed by: NURSE PRACTITIONER

## 2022-11-18 PROCEDURE — 1090F PRES/ABSN URINE INCON ASSESS: CPT | Performed by: NURSE PRACTITIONER

## 2022-11-18 PROCEDURE — 73502 X-RAY EXAM HIP UNI 2-3 VIEWS: CPT

## 2022-11-18 PROCEDURE — 1123F ACP DISCUSS/DSCN MKR DOCD: CPT | Performed by: NURSE PRACTITIONER

## 2022-11-18 PROCEDURE — G8484 FLU IMMUNIZE NO ADMIN: HCPCS | Performed by: NURSE PRACTITIONER

## 2022-11-18 PROCEDURE — 1036F TOBACCO NON-USER: CPT | Performed by: NURSE PRACTITIONER

## 2022-11-18 PROCEDURE — G8399 PT W/DXA RESULTS DOCUMENT: HCPCS | Performed by: NURSE PRACTITIONER

## 2022-11-18 PROCEDURE — G8427 DOCREV CUR MEDS BY ELIG CLIN: HCPCS | Performed by: NURSE PRACTITIONER

## 2022-11-18 ASSESSMENT — ENCOUNTER SYMPTOMS
NAUSEA: 0
WHEEZING: 0
DIARRHEA: 0
BACK PAIN: 0
SHORTNESS OF BREATH: 0
VOMITING: 0
COUGH: 0
CONSTIPATION: 0

## 2022-11-18 NOTE — PROGRESS NOTES
Ken Mai (:  1940) is a [de-identified] y.o. female,Established patient, here for evaluation of the following chief complaint(s):  Leg Pain (R Leg pain Knee up to hip)         ASSESSMENT/PLAN:  1. Chronic pain of right knee  -     XR KNEE RIGHT (3 VIEWS); Future  2. Fall, sequela  -     XR KNEE RIGHT (3 VIEWS); Future  -     XR HIP RIGHT (2-3 VIEWS); Future  3. Right hip pain  -     XR HIP RIGHT (2-3 VIEWS); Future  4. Osteoarthritis of right hip, unspecified osteoarthritis type  -     Handicap [facility] MISC; Starting Fri 2022, Disp-1 each, R-0, PrintPatient cannot walk 200 ft without stopping to rest.   Expiration 2027  -  patient will use diclofenac gel to painful joints  -  patient scheduled to see Dr. Sergei Watson Dec 13, 2022       Return if symptoms worsen or fail to improve. Subjective   SUBJECTIVE/OBJECTIVE:  HPI  Here today for right leg pain. Landed on right leg when she fell and broke right wrist in August. Symptoms started after the fall. She was being seen by ortho for right knee pain, recommended right total knee but she does not want to do that at this time. She also had a right hip XR in 2022 before the fall and is showed severe osteoarthritis. Pain has gotten worse more recently. She is concerned about her joints. BP (!) 120/58   Pulse 75   Temp 97.4 °F (36.3 °C)   Resp 16   Ht 5' 5\" (1.651 m)   Wt 182 lb 9.6 oz (82.8 kg)   LMP  (LMP Unknown)   SpO2 99%   BMI 30.39 kg/m²     Review of Systems   Constitutional:  Negative for activity change, appetite change, chills, diaphoresis, fever and unexpected weight change. Respiratory:  Negative for cough, shortness of breath and wheezing. Cardiovascular:  Negative for chest pain and palpitations. Gastrointestinal:  Negative for constipation, diarrhea, nausea and vomiting. Genitourinary:  Negative for difficulty urinating. Musculoskeletal:  Positive for arthralgias (right hip and knee).  Negative for back pain and neck pain. Neurological:  Negative for dizziness, weakness and headaches. Psychiatric/Behavioral:  Negative for dysphoric mood and sleep disturbance. The patient is not nervous/anxious. Objective   Physical Exam  Constitutional:       Appearance: She is well-developed. HENT:      Head: Normocephalic and atraumatic. Neck:      Thyroid: No thyromegaly. Trachea: No tracheal deviation. Cardiovascular:      Rate and Rhythm: Normal rate and regular rhythm. Heart sounds: No murmur heard. Pulmonary:      Effort: Pulmonary effort is normal. No respiratory distress. Breath sounds: Normal breath sounds. Abdominal:      General: Bowel sounds are normal.      Palpations: Abdomen is soft. Tenderness: There is no abdominal tenderness. Musculoskeletal:         General: Tenderness present. Normal range of motion. Comments: Diffuse tenderness to right knee, edema noted. ROM of right hip is limited due to pain in the greater trochanter area and right knee, no pain in groin. Lymphadenopathy:      Cervical: No cervical adenopathy. Skin:     General: Skin is warm and dry. Neurological:      Mental Status: She is alert and oriented to person, place, and time. Psychiatric:         Behavior: Behavior normal.          On this date 11/18/2022 I have spent 20 minutes reviewing previous notes, test results and face to face with the patient discussing the diagnosis and importance of compliance with the treatment plan as well as documenting on the day of the visit. An electronic signature was used to authenticate this note.     --MARVIN Price - CNP

## 2022-11-22 ENCOUNTER — TREATMENT (OUTPATIENT)
Dept: OCCUPATIONAL THERAPY | Age: 82
End: 2022-11-22
Payer: MEDICARE

## 2022-11-22 DIAGNOSIS — S52.591A OTHER CLOSED FRACTURE OF DISTAL END OF RIGHT RADIUS, INITIAL ENCOUNTER: Primary | ICD-10-CM

## 2022-11-22 PROCEDURE — 97110 THERAPEUTIC EXERCISES: CPT | Performed by: OCCUPATIONAL THERAPY ASSISTANT

## 2022-11-22 PROCEDURE — 97022 WHIRLPOOL THERAPY: CPT | Performed by: OCCUPATIONAL THERAPY ASSISTANT

## 2022-11-22 PROCEDURE — 97530 THERAPEUTIC ACTIVITIES: CPT | Performed by: OCCUPATIONAL THERAPY ASSISTANT

## 2022-11-22 PROCEDURE — 97140 MANUAL THERAPY 1/> REGIONS: CPT | Performed by: OCCUPATIONAL THERAPY ASSISTANT

## 2022-11-22 NOTE — PROGRESS NOTES
[facility] [address]  Dept: [address] [place] [address] [address]: [address] OT Fax: [phone] THERAPY DAILY NOTE    Date:  2022  Initial Evaluation Date: 10/18/22      Patient Name:  Lai Fish    :  1940    Restrictions/Precautions:   Activity as tolerated,  Low fall risk, Poor STM  Diagnosis:  S52.591A (ICD-10-CM) - Other closed fracture of distal end of right radius, initial encounter                        Date of Surgery/Injury: R wrist ORIF 23     Insurance/Certification information:  Medicare/ [employer]  Plan of care signed (Y/N): N  Visit# / total visits: 10/ 12     Referring Practitioner:  Dr Heber Hernandez Practitioner Orders: OT evaluate and treat     Assessment of current deficits   [] Functional mobility             [x] ADLs           [x] Strength                  [] Cognition   [] Functional transfers           [x] IADLs          [] Safety Awareness  [] Endurance   [] Fine Motor Coordination    [] Balance      [] Vision/perception    [] Sensation     [] Gross Motor Coordination [x] ROM           [x] Pain                        [x] Edema          [x] Scar Adhesion/Skin Integrity      OT PLAN OF CARE   OT POC based on physician orders, patient diagnosis and results of clinical assessment     Frequency/Duration: 2x/ week x 6 weeks  Specific OT Treatment to include:      [x] Instruction in HEP                   Modalities:  [x] Therapeutic Exercise                 [x] Ultrasound               [] Electrical Stimulation/Attended  [x] PROM/Stretching                    [x] Fluidotherapy          [x]  Paraffin                   [x] AAROM  [x] AROM                 [] Iontophoresis:   [] Tendon Glides                                               [] Neuromuscular Re-Ed            [] ADL/IADL re-training    [x] Therapeutic Activity                  [x] Pain Management with/without modalities PRN [x] Manual Therapy                      [] Splinting                                   [x] Scar Management                   []Joint Protection/Training  []Ergonomics                             [] Joint Mobilization                      [] Adaptive Equipment Assessment/Training                             [x] Manual Edema Mobilization   [] Myofascial Release                 [] Energy Conservation/Work Simplification  [] GM/FM Coordination                [] Safety retraining/education per  individual diagnosis/goals  [] Desensitization        Patient Specific Goal: pt wants to get better so she can get back to doing everything                             GOALS (Long term same as Short term): updated 11-17-22  1) Patient will demonstrate good understanding of home program (exercises/activities/diagnosis/prognosis/goals) with good accuracy. (ongoing for ROM, and progressive strengthening)  2) Patient will demonstrate increased active/passive range of motion of their R wrist/ hand to [facility] for ADL/IADL completion. (Progress noted as reported below)  3) Patient will demonstrate increased /pinch strength of at least 10 / 3 pinch pounds of their R hand. (Progress noted)   Right : 5# with pain to 19#  R  Lateral Pinch :  3# with pain to 5#    4) Patient to report decreased pain in their affected R distal upper extremity from 8-9/10 to 3/10 or less with resistive functional use. (Goal met- pain is mild on most days 1-3/10 with resistive use)  5) Patient will demonstrate a non-tender/non-adherent scar. (Goal met)  6) Patient will report ADL/ IADL  functions as Mod I/I using the R wrist/ hand including resuming regular bathing/ grooming routine and being able to completed light cooking/ cleaning. (Goal met- improved toileting, washing up, light cleaning and light meal prep)    Pain Level: mild soreness, 4/10 pain at the wrist with motion    Subjective: Pt presents with no new complaints.     Objective:  Updated POC to be completed by  12/18/22. INTERVENTION: COMPLETED: SPECIFICS/COMMENTS:   Modality:     fluidotherapy x -10 mins to increase soft tissue mobility as well as decrease pain, AROM encouraged throughout modality    Paraffin bath   -10 mins    AROM:     Wrist/forearm X  x      x -all planes  -jux-a-cisor 6 reps  -blue ball ex's- wrist flexion/extension and RD/UD- 15 reps each. -Green therabar- pronation/supination and RD/UD- 15 reps each. digital               x     -opposition with pennies  -tendon glides  -in hand manipulation with pennies  -alt prehension with small beads  - in hand manipulation with small beads ^d to buttons.   -towel scrunches  -exercise spheres- CW-20 reps CCW- 10 reps- more difficult. -\"bunchems\" deconstruction  -Purdue pegboard ax with alt prehension   AAROM:               PROM/Stretching:     wrist x -all planes- gentle   thumb x -all planes   digital x -all planes   Scar Mass/Edema Control:     Soft tissue massage x -to increase pain free ROM and increase soft tissue mobility   Retrograde massage x -to decrease edema    Strengthening:     Light digital  X      x -yellow-light resistive putty, roll/pinch manipulation, gripping added kneading today. -Red digiflex ex's- composite- 20 reps individual- 20 reps         Other:                 Assessment/Comments: Pt is making  progress toward stated plan of care. Pt. Tolerated all exercises and stretches today. Initiated more strengthening exercises today. Will continue to advance as tolerated.                  -Rehab Potential: Good  -Requires OT Follow Up: Yes    Time In: 1305          Time Out: 1400            Visit #: 10    Treatment Charges: Mins Units   Modalities-fluido 10 1   Ther Exercise 15 1   Manual Therapy 15 1   Thera Activities 15 1   ADL/Home Mgt      Neuro Re-education     Gait Training     Group Therapy     Non-Billable Service Time     Other     Total Time/Units 55 4       -Response to Treatment: tolerated session well  Goals: Goals for pt can be see on initial eval 10-18-22    Plan:   [x]  Continues Plan of care:  Continue to focus on AROM and strength next session. Treatment covered based on POC and graduated to patient's progress. Pt education continues at each visit to obtain maximum benefits from skilled OT intervention.   []  [address] of care:   []  Discharge:      HELENA Hardwick/RADAMES [id number]

## 2022-11-30 ENCOUNTER — TREATMENT (OUTPATIENT)
Dept: OCCUPATIONAL THERAPY | Age: 82
End: 2022-11-30

## 2022-11-30 DIAGNOSIS — S52.591A OTHER CLOSED FRACTURE OF DISTAL END OF RIGHT RADIUS, INITIAL ENCOUNTER: Primary | ICD-10-CM

## 2022-11-30 NOTE — PROGRESS NOTES
[address] [facility] [address]  Dept: [address] [place] [address] [address]: [address] OT Fax: [phone] THERAPY DAILY NOTE    Date:  2022  Initial Evaluation Date: 10/18/22      Patient Name:  Jessica Espinosa    :  1940    Restrictions/Precautions:   Activity as tolerated,  Low fall risk, Poor STM  Diagnosis:  S52.591A (ICD-10-CM) - Other closed fracture of distal end of right radius, initial encounter                        Date of Surgery/Injury: R wrist ORIF 3/61/49     Insurance/Certification information:  Medicare/ [employer] [place]  Plan of care signed (Y/N): N  Visit# / total visits:      Referring Practitioner:  Dr Tatiana Reynolds  Specific Practitioner Orders: OT evaluate and treat     Assessment of current deficits   [] Functional mobility             [x] ADLs           [x] Strength                  [] Cognition   [] Functional transfers           [x] IADLs          [] Safety Awareness  [] Endurance   [] Fine Motor Coordination    [] Balance      [] Vision/perception    [] Sensation     [] Gross Motor Coordination [x] ROM           [x] Pain                        [x] Edema          [x] Scar Adhesion/Skin Integrity      OT PLAN OF CARE   OT POC based on physician orders, patient diagnosis and results of clinical assessment     Frequency/Duration: 2x/ week x 6 weeks  Specific OT Treatment to include:      [x] Instruction in HEP                   Modalities:  [x] Therapeutic Exercise                 [x] Ultrasound               [] Electrical Stimulation/Attended  [x] PROM/Stretching                    [x] Fluidotherapy          [x]  Paraffin                   [x] AAROM  [x] AROM                 [] Iontophoresis:   [] Tendon Glides                                               [] Neuromuscular Re-Ed            [] ADL/IADL re-training    [x] Therapeutic Activity                  [x] Pain Management with/without modalities PRN [x] Manual Therapy                      [] Splinting                                   [x] Scar Management                   []Joint Protection/Training  []Ergonomics                             [] Joint Mobilization                      [] Adaptive Equipment Assessment/Training                             [x] Manual Edema Mobilization   [] Myofascial Release                 [] Energy Conservation/Work Simplification  [] GM/FM Coordination                [] Safety retraining/education per  individual diagnosis/goals  [] Desensitization        Patient Specific Goal: pt wants to get better so she can get back to doing everything                             GOALS (Long term same as Short term): updated 11-17-22  1) Patient will demonstrate good understanding of home program (exercises/activities/diagnosis/prognosis/goals) with good accuracy. (ongoing for ROM, and progressive strengthening)  2) Patient will demonstrate increased active/passive range of motion of their R wrist/ hand to [facility] for ADL/IADL completion. (Progress noted as reported below)  3) Patient will demonstrate increased /pinch strength of at least 10 / 3 pinch pounds of their R hand. (Progress noted)   Right : 5# with pain to 19#  R  Lateral Pinch :  3# with pain to 5#    4) Patient to report decreased pain in their affected R distal upper extremity from 8-9/10 to 3/10 or less with resistive functional use. (Goal met- pain is mild on most days 1-3/10 with resistive use)  5) Patient will demonstrate a non-tender/non-adherent scar. (Goal met)  6) Patient will report ADL/ IADL  functions as Mod I/I using the R wrist/ hand including resuming regular bathing/ grooming routine and being able to completed light cooking/ cleaning.   (Goal met- improved toileting, washing up, light cleaning and light meal prep)    Pain Level: mild soreness, 3/10 pain at the wrist with motion    Subjective: Pt. Stated \"I am starting to use my R hand normally again\" Objective:  Updated POC to be completed by  12/18/22. INTERVENTION: COMPLETED: SPECIFICS/COMMENTS:   Modality:     fluidotherapy x -10 mins to increase soft tissue mobility as well as decrease pain, AROM encouraged throughout modality    Paraffin bath   -10 mins    AROM:     Wrist/forearm X  x       -all planes  -jux-a-cisor 6 reps  -blue ball ex's- wrist flexion/extension and RD/UD- 15 reps each. -Green therabar- pronation/supination and RD/UD- 15 reps each. digital               x     -opposition with pennies  -tendon glides  -in hand manipulation with pennies  -alt prehension with small beads  - in hand manipulation with small beads ^d to buttons.   -towel scrunches  -exercise spheres- CW-20 reps CCW- 10 reps- more difficult. -\"bunchems\" deconstruction  -Purdue pegboard ax with alt prehension   AAROM:               PROM/Stretching:     wrist x -all planes- gentle   thumb x -all planes   digital x -all planes   Scar Mass/Edema Control:     Soft tissue massage x -to increase pain free ROM and increase soft tissue mobility   Retrograde massage x -to decrease edema    Strengthening:     Light digital        x -yellow-light resistive putty, roll/pinch manipulation, gripping added kneading today. -Red digiflex ex's- composite- 20 reps individual- 20 reps     R wrist/forearm  X    x -PRE's- 1# wt. Wrist flexion/extension and UD/RD- 10 reps each   -1# wt'd dowel- UD/RD and pronation/supination- 20 reps each. Other:                 Assessment/Comments: Pt is making  progress toward stated plan of care. Pt. Tolerated all exercises and stretches today. Initiated wrist strengthening exercises today. Will see for one more session and then transition to HEP.                  -Rehab Potential: Good  -Requires OT Follow Up: Yes    Time In: 1105         Time Out: 1200            Visit #: 11    Treatment Charges: Mins Units   Modalities-fluido 10 1   Ther Exercise 15 1   Manual Therapy 15 1   Thera Activities 15 1   ADL/Home Mgt      Neuro Re-education     Gait Training     Group Therapy     Non-Billable Service Time     Other     Total Time/Units 55 4       -Response to Treatment: Pt. Is happy that she is using her RUE normally again with activities. Goals: Goals for pt can be see on initial eval 10-18-22    Plan:   [x]  Continues Plan of care:  Continue to focus on AROM and strength next session. Treatment covered based on POC and graduated to patient's progress. Pt education continues at each visit to obtain maximum benefits from skilled OT intervention.   []  [facility] of care:   []  Discharge:      Jenifer Marin, HELENA/L [id number]

## 2022-12-02 ENCOUNTER — TREATMENT (OUTPATIENT)
Dept: OCCUPATIONAL THERAPY | Age: 82
End: 2022-12-02

## 2022-12-02 DIAGNOSIS — S52.591A OTHER CLOSED FRACTURE OF DISTAL END OF RIGHT RADIUS, INITIAL ENCOUNTER: Primary | ICD-10-CM

## 2022-12-02 NOTE — PROGRESS NOTES
[address] [place] [address]  Dept: [address] [place] [address] [address]: [address] OT Fax: [phone]    CCUPATIONAL THERAPY PROGRESS NOTE/ DISCHARGE SUMMARY    Date:  2022  Initial Evaluation Date: 10/18/22      Patient Name:  Ava Chase    :  1940    Restrictions/Precautions:   Activity as tolerated,  Low fall risk, Poor STM  Diagnosis:  S52.591A (ICD-10-CM) - Other closed fracture of distal end of right radius, initial encounter                        Date of Surgery/Injury: R wrist ORIF      Insurance/Certification information:  Medicare/ [employer]  Plan of care signed (Y/N): Y through 23  Visit# / total visits:      Referring Practitioner:  Dr Earl Narvaez  Specific Practitioner Orders: OT evaluate and treat     Assessment of current deficits   [] Functional mobility             [x] ADLs           [x] Strength                  [] Cognition   [] Functional transfers           [x] IADLs          [] Safety Awareness  [] Endurance   [] Fine Motor Coordination    [] Balance      [] Vision/perception    [] Sensation     [] Gross Motor Coordination [x] ROM           [x] Pain                        [x] Edema          [x] Scar Adhesion/Skin Integrity      OT PLAN OF CARE   OT POC based on physician orders, patient diagnosis and results of clinical assessment     Frequency/Duration: 2x/ week x 6 weeks  Specific OT Treatment to include:      [x] Instruction in HEP                   Modalities:  [x] Therapeutic Exercise                 [x] Ultrasound               [] Electrical Stimulation/Attended  [x] PROM/Stretching                    [x] Fluidotherapy          [x]  Paraffin                   [x] AAROM  [x] AROM                 [] Iontophoresis:   [] Tendon Glides                                               [] Neuromuscular Re-Ed            [] ADL/IADL re-training    [x] Therapeutic Activity                  [x] Pain Management with/without modalities PRN                 [x] Manual Therapy                      [] Splinting                                   [x] Scar Management                   []Joint Protection/Training  []Ergonomics                             [] Joint Mobilization                      [] Adaptive Equipment Assessment/Training                             [x] Manual Edema Mobilization   [] Myofascial Release                 [] Energy Conservation/Work Simplification  [] GM/FM Coordination                [] Safety retraining/education per  individual diagnosis/goals  [] Desensitization        Patient Specific Goal: pt wants to get better so she can get back to doing everything                             GOALS (Long term same as Short term): updated 12/2/22  1) Patient will demonstrate good understanding of home program (exercises/activities/diagnosis/prognosis/goals) with good accuracy. Goal Met. Pt. To continue with HEP as needed. 2) Patient will demonstrate increased active/passive range of motion of their R wrist/ hand to [facility] for ADL/IADL completion. (Progress noted as reported below)  3) Patient will demonstrate increased /pinch strength of at least 10 / 3 pinch pounds of their R hand. (Goal met)  Right : 5# with pain to 19# to 21# today. R  Lateral Pinch :  3# with pain to 5# to 6# today. 4) Patient to report decreased pain in their affected R distal upper extremity from 8-9/10 to 3/10 or less with resistive functional use. (Goal met- pain is mild on most days 1-2/10 with resistive use)  5) Patient will demonstrate a non-tender/non-adherent scar. (Goal met)  6) Patient will report ADL/ IADL  functions as Mod I/I using the R wrist/ hand including resuming regular bathing/ grooming routine and being able to completed light cooking/ cleaning.   (Goal met- improved toileting, washing up, light cleaning and light meal prep)    Pain Level: mild soreness, 2/10 pain at the wrist with motion    Subjective: Pt. Stated \"I am able to lift a 1/2 gallon of milk now\"     Objective:  Updated POC to be completed by  12/18/22. INTERVENTION: COMPLETED: SPECIFICS/COMMENTS:   Modality:     fluidotherapy x -10 mins to increase soft tissue mobility as well as decrease pain, AROM encouraged throughout modality    Paraffin bath   -10 mins    AROM:     Wrist/forearm X  x       -all planes  -jux-a-cisor 6 reps  -blue ball ex's- wrist flexion/extension and RD/UD- 15 reps each. -Green therabar- pronation/supination and RD/UD- 15 reps each. digital                  -opposition with pennies  -tendon glides  -in hand manipulation with pennies  -alt prehension with small beads  - in hand manipulation with small beads ^d to buttons.   -towel scrunches  -exercise spheres- CW-20 reps CCW- 10 reps- more difficult. -\"bunchems\" deconstruction  -Purdue pegboard ax with alt prehension   AAROM:               PROM/Stretching:     wrist x -all planes- gentle   thumb x -all planes   digital x -all planes   Scar Mass/Edema Control:     Soft tissue massage x -to increase pain free ROM and increase soft tissue mobility   Retrograde massage x -to decrease edema    Strengthening:     Light digital  x       -yellow-light resistive putty, roll/pinch manipulation, gripping added kneading today. -Red digiflex ex's- composite- 20 reps individual- 20 reps     R wrist/forearm  x     -PRE's- 1# wt. Wrist flexion/extension and UD/RD- 10 reps each   -1# wt'd dowel- UD/RD and pronation/supination- 20 reps each. Other:                 Assessment/Comments: Pt is making  progress toward stated plan of care. Pt. Tolerated all exercises and stretches today. Pt. Met or made good progress towards all goals. Pt. Will be discharged from OT today and continue with HEP. Collaborated with OT on current plan of care.           R Wrist AROM  10-18-22 ( all mvmt is painful)  11-17-22 12/2/22    Flexion  0-40  0-40 0-50    extension  0-53  0-55 0-62    RD  0-15  0-25 0-32    UD  0-25  0-35 0-35       R Supination  0-70  [facility] WFL    R pronation  [facility] WFL    Thumb radial ext  0-45  0-58 0-65    Thumb ABD  0-50  0-56 0-56      IP  0-50  0-65 0-70                   -Rehab Potential: Good  -Requires OT Follow Up: Yes    Time In: 1305        Time Out: 1400            Visit #: 12    Treatment Charges: Mins Units   Modalities-fluido 10 1   Ther Exercise 10 1   Manual Therapy 15 1   Thera Activities 20 1   ADL/Home Mgt      Neuro Re-education     Gait Training     Group Therapy     Non-Billable Service Time     Other     Total Time/Units 55 4       -Response to Treatment: Pt. Is happy that she is using her RUE normally again with activities. Goals: Goals for pt can be see on initial eval 10-18-22    Plan:   []  Continues Plan of care: Pt education continues at each visit to obtain maximum benefits from skilled OT intervention. []  Alter Plan of care:   [x]  Discharge: Continue with HEP.        Backpack, [facility]

## 2022-12-12 DIAGNOSIS — S52.591A OTHER CLOSED FRACTURE OF DISTAL END OF RIGHT RADIUS, INITIAL ENCOUNTER: Primary | ICD-10-CM

## 2022-12-13 ENCOUNTER — OFFICE VISIT (OUTPATIENT)
Dept: ORTHOPEDIC SURGERY | Age: 82
End: 2022-12-13
Payer: MEDICARE

## 2022-12-13 VITALS — BODY MASS INDEX: 30.32 KG/M2 | WEIGHT: 182 LBS | HEIGHT: 65 IN | TEMPERATURE: 98 F

## 2022-12-13 DIAGNOSIS — S52.591A OTHER CLOSED FRACTURE OF DISTAL END OF RIGHT RADIUS, INITIAL ENCOUNTER: Primary | ICD-10-CM

## 2022-12-13 PROCEDURE — G8417 CALC BMI ABV UP PARAM F/U: HCPCS | Performed by: ORTHOPAEDIC SURGERY

## 2022-12-13 PROCEDURE — 1123F ACP DISCUSS/DSCN MKR DOCD: CPT | Performed by: ORTHOPAEDIC SURGERY

## 2022-12-13 PROCEDURE — 1036F TOBACCO NON-USER: CPT | Performed by: ORTHOPAEDIC SURGERY

## 2022-12-13 PROCEDURE — 99212 OFFICE O/P EST SF 10 MIN: CPT | Performed by: ORTHOPAEDIC SURGERY

## 2022-12-13 PROCEDURE — G8427 DOCREV CUR MEDS BY ELIG CLIN: HCPCS | Performed by: ORTHOPAEDIC SURGERY

## 2022-12-13 PROCEDURE — G8399 PT W/DXA RESULTS DOCUMENT: HCPCS | Performed by: ORTHOPAEDIC SURGERY

## 2022-12-13 PROCEDURE — 1090F PRES/ABSN URINE INCON ASSESS: CPT | Performed by: ORTHOPAEDIC SURGERY

## 2022-12-13 PROCEDURE — G8484 FLU IMMUNIZE NO ADMIN: HCPCS | Performed by: ORTHOPAEDIC SURGERY

## 2022-12-13 NOTE — PROGRESS NOTES
Ms. Donta Pérez returns today for follow-up of a right distal radius fracture which was treated with ORIF . Date of surgery was 8/29/22. she reports she is doing well. Physical Exam:  RUE - Skin intact with healed incision         Nontender to palpation at the area of the fracture site. ROM   full          The incision is healing well without evidence of infection. Pulses are intact and symmetric bilaterally         Strength intact          Sensation intact    Xrays:healed distal radius s/p orif     Radiographic findings reviewed with patient    Impression:   Encounter Diagnosis   Name Primary?     Other closed fracture of distal end of right radius, initial encounter Yes         Plan:   Hep  Activity as tolerated  Fu prn
Support Present

## 2022-12-16 ENCOUNTER — INITIAL CONSULT (OUTPATIENT)
Dept: GERIATRIC MEDICINE | Age: 82
End: 2022-12-16
Payer: MEDICARE

## 2022-12-16 VITALS
SYSTOLIC BLOOD PRESSURE: 118 MMHG | RESPIRATION RATE: 24 BRPM | WEIGHT: 186.3 LBS | BODY MASS INDEX: 29.94 KG/M2 | HEART RATE: 72 BPM | TEMPERATURE: 97 F | HEIGHT: 66 IN | DIASTOLIC BLOOD PRESSURE: 64 MMHG

## 2022-12-16 DIAGNOSIS — G31.84 MCI (MILD COGNITIVE IMPAIRMENT): Primary | ICD-10-CM

## 2022-12-16 PROCEDURE — 3074F SYST BP LT 130 MM HG: CPT | Performed by: INTERNAL MEDICINE

## 2022-12-16 PROCEDURE — 3078F DIAST BP <80 MM HG: CPT | Performed by: INTERNAL MEDICINE

## 2022-12-16 PROCEDURE — G8417 CALC BMI ABV UP PARAM F/U: HCPCS | Performed by: INTERNAL MEDICINE

## 2022-12-16 PROCEDURE — 99202 OFFICE O/P NEW SF 15 MIN: CPT | Performed by: INTERNAL MEDICINE

## 2022-12-16 PROCEDURE — G8399 PT W/DXA RESULTS DOCUMENT: HCPCS | Performed by: INTERNAL MEDICINE

## 2022-12-16 PROCEDURE — 99212 OFFICE O/P EST SF 10 MIN: CPT | Performed by: INTERNAL MEDICINE

## 2022-12-16 PROCEDURE — 1036F TOBACCO NON-USER: CPT | Performed by: INTERNAL MEDICINE

## 2022-12-16 PROCEDURE — 1090F PRES/ABSN URINE INCON ASSESS: CPT | Performed by: INTERNAL MEDICINE

## 2022-12-16 PROCEDURE — G8484 FLU IMMUNIZE NO ADMIN: HCPCS | Performed by: INTERNAL MEDICINE

## 2022-12-16 PROCEDURE — 1123F ACP DISCUSS/DSCN MKR DOCD: CPT | Performed by: INTERNAL MEDICINE

## 2022-12-16 PROCEDURE — G8427 DOCREV CUR MEDS BY ELIG CLIN: HCPCS | Performed by: INTERNAL MEDICINE

## 2022-12-16 RX ORDER — ACETAMINOPHEN 160 MG
1 TABLET,DISINTEGRATING ORAL DAILY
COMMUNITY

## 2022-12-16 ASSESSMENT — LIFESTYLE VARIABLES: HOW OFTEN DO YOU HAVE A DRINK CONTAINING ALCOHOL: NEVER

## 2022-12-16 ASSESSMENT — PATIENT HEALTH QUESTIONNAIRE - PHQ9
SUM OF ALL RESPONSES TO PHQ QUESTIONS 1-9: 0
SUM OF ALL RESPONSES TO PHQ9 QUESTIONS 1 & 2: 0
1. LITTLE INTEREST OR PLEASURE IN DOING THINGS: 0
2. FEELING DOWN, DEPRESSED OR HOPELESS: 0

## 2022-12-16 NOTE — PROGRESS NOTES
CC Evaluate memory    Here alone and never    Lives in [place]  by the dairy [place]    Used to work at [employer]  In [employer]  HYM\" Forgetting everyone's names at times and comes back later  Few medical issue except BP   HLD  Grad of [employer], C student  [employer] x 1 year  Goes to [place] but not since [place]  Had s concussion x 1 in [place] [id number]  IADL's independent  Hx of HTN  EETFBB, S/P cataracts. Dry eyes  Pes planus goes to [facility]  OAB ??   Minicogg noted  Simba Cherry in august  1/3  5 minute memory  Impression: MCI and no evidemce of Dementia   Plan: WIll exercise more             MIND diet

## 2022-12-16 NOTE — PROGRESS NOTES
Chief Complaint   Patient presents with    Consultation     Referral from PCP office -- Charlene Willis CNP re: memory changes. Here alone, states she drove herself.

## 2022-12-28 DIAGNOSIS — E78.00 PURE HYPERCHOLESTEROLEMIA: ICD-10-CM

## 2022-12-28 RX ORDER — ATORVASTATIN CALCIUM 10 MG/1
10 TABLET, FILM COATED ORAL DAILY
Qty: 90 TABLET | Refills: 0 | Status: SHIPPED | OUTPATIENT
Start: 2022-12-28

## 2022-12-28 NOTE — TELEPHONE ENCOUNTER
Requested Prescriptions     Pending Prescriptions Disp Refills    atorvastatin (LIPITOR) 10 MG tablet 90 tablet 0     Sig: Take 1 tablet by mouth daily       Next appt is Visit date not found  Last appt was 11/18/2022

## 2023-01-03 ENCOUNTER — OFFICE VISIT (OUTPATIENT)
Dept: ORTHOPEDIC SURGERY | Age: 83
End: 2023-01-03
Payer: MEDICARE

## 2023-01-03 VITALS — TEMPERATURE: 98 F | WEIGHT: 186 LBS | HEIGHT: 66 IN | BODY MASS INDEX: 29.89 KG/M2

## 2023-01-03 DIAGNOSIS — M17.11 PRIMARY OSTEOARTHRITIS OF RIGHT KNEE: Primary | ICD-10-CM

## 2023-01-03 PROCEDURE — 1123F ACP DISCUSS/DSCN MKR DOCD: CPT | Performed by: ORTHOPAEDIC SURGERY

## 2023-01-03 PROCEDURE — 1036F TOBACCO NON-USER: CPT | Performed by: ORTHOPAEDIC SURGERY

## 2023-01-03 PROCEDURE — G8417 CALC BMI ABV UP PARAM F/U: HCPCS | Performed by: ORTHOPAEDIC SURGERY

## 2023-01-03 PROCEDURE — 99213 OFFICE O/P EST LOW 20 MIN: CPT | Performed by: ORTHOPAEDIC SURGERY

## 2023-01-03 PROCEDURE — G8484 FLU IMMUNIZE NO ADMIN: HCPCS | Performed by: ORTHOPAEDIC SURGERY

## 2023-01-03 PROCEDURE — G8399 PT W/DXA RESULTS DOCUMENT: HCPCS | Performed by: ORTHOPAEDIC SURGERY

## 2023-01-03 PROCEDURE — G8427 DOCREV CUR MEDS BY ELIG CLIN: HCPCS | Performed by: ORTHOPAEDIC SURGERY

## 2023-01-03 PROCEDURE — 1090F PRES/ABSN URINE INCON ASSESS: CPT | Performed by: ORTHOPAEDIC SURGERY

## 2023-01-03 PROCEDURE — 20610 DRAIN/INJ JOINT/BURSA W/O US: CPT | Performed by: ORTHOPAEDIC SURGERY

## 2023-01-03 RX ORDER — TRIAMCINOLONE ACETONIDE 40 MG/ML
40 INJECTION, SUSPENSION INTRA-ARTICULAR; INTRAMUSCULAR ONCE
Status: COMPLETED | OUTPATIENT
Start: 2023-01-03 | End: 2023-01-03

## 2023-01-03 RX ADMIN — TRIAMCINOLONE ACETONIDE 40 MG: 40 INJECTION, SUSPENSION INTRA-ARTICULAR; INTRAMUSCULAR at 11:00

## 2023-01-03 NOTE — PROGRESS NOTES
Chief Complaint   Patient presents with    Knee Pain     Right knee pain f/u having a lot of pain. Raven Ashford returns today for follow-up of her right knee pain. she reports this is worse than when I saw her last.  The patient's pain level is a 7/10.  T  Past Medical History:   Diagnosis Date    Asthma     Hernia     Hypertension      Past Surgical History:   Procedure Laterality Date    BLADDER SURGERY      BREAST SURGERY Left     benign    DILATION AND CURETTAGE OF UTERUS      FRACTURE SURGERY      right arm    HYSTERECTOMY (CERVIX STATUS UNKNOWN)      KNEE CARTILAGE SURGERY      WRIST FRACTURE SURGERY Right 8/29/2022    RIGHT WRIST OPEN REDUCTION INTERNAL FIXATION VS. APPLICATION EXTERNAL FIXATOR DISTAL RADIUS FRACTURE performed by Colletta Paterson, DO at [address]       Current Outpatient Medications:     atorvastatin (LIPITOR) 10 MG tablet, Take 1 tablet by mouth daily, Disp: 90 tablet, Rfl: 0    Cholecalciferol (VITAMIN D3) 50 MCG (2000 UT) CAPS, Take 1 capsule by mouth daily, Disp: , Rfl:     Multiple Vitamins-Minerals (CENTRUM SILVER PO), Take 1 tablet by mouth daily, Disp: , Rfl:     Homeopathic Products (LAVENDER REJUVENATING) OIL, Inhale into the lungs as needed Inhales when she feels an asthma attack starting., Disp: , Rfl:     Polyethyl Glycol-Propyl Glycol (SYSTANE OP), Apply 1 drop to eye 4 times daily Both eyes, Disp: , Rfl:     Sodium Chloride, Hypertonic, (YADIRA 128 OP), Apply 1 drop to eye daily, Disp: , Rfl:     Handicap Placard MISC, by Does not apply route Patient cannot walk 200 ft without stopping to rest.   Expiration 11/18/2027, Disp: 1 each, Rfl: 0    lisinopril-hydroCHLOROthiazide (PRINZIDE;ZESTORETIC) 10-12.5 MG per tablet, Take 1 tablet by mouth in the morning., Disp: 90 tablet, Rfl: 1    albuterol sulfate  (90 BASE) MCG/ACT inhaler, Inhale 2 puffs into the lungs every 6 hours as needed for Wheezing, Disp: , Rfl:     vitamin B-12 (CYANOCOBALAMIN) 1000 MCG tablet, Take 1,000 mcg by mouth 2x/weekly, Disp: , Rfl:     aspirin 81 MG chewable tablet, Take 81 mg by mouth daily, Disp: , Rfl:     COCONUT OIL PO, Take 1,000 mg by mouth 2 tablets daily, Disp: , Rfl:   No Known Allergies  Social History     Socioeconomic History    Marital status: Single     Spouse name: Not on file    Number of children: Not on file    Years of education: Not on file    Highest education level: Not on file   Occupational History    Not on file   Tobacco Use    Smoking status: Former     Packs/day: 0.40     Years: 20.00     Pack years: 8.00     Types: Cigarettes     Quit date: 38     Years since quittin.0    Smokeless tobacco: Never   Substance and Sexual Activity    Alcohol use: No     Alcohol/week: 0.0 standard drinks    Drug use: No    Sexual activity: Not on file   Other Topics Concern    Not on file   Social History Narrative    Not on file     Social Determinants of Health     Financial Resource Strain: Low Risk     Difficulty of Paying Living Expenses: Not hard at all   Food Insecurity: No Food Insecurity    Worried About Running Out of Food in the Last Year: Never true    Ran Out of Food in the Last Year: Never true   Transportation Needs: Not on file   Physical Activity: Not on file   Stress: Not on file   Social Connections: Not on file   Intimate Partner Violence: Not on file   Housing Stability: Not on file     Family History   Problem Relation Age of Onset    Cancer Father     Lung Cancer Father     Cancer Sister     Kidney Cancer Sister     Cancer Brother     Kidney Cancer Brother        Review of Systems:     Skin: (-) rash,(-) psoriasis,(-) eczema, (-)skin cancer. Musculoskeletal: (-) fractures,  (-) dislocations,(-) collagen vascular disease, (-) fibromyalgia, (-) multiple sclerosis, (-) muscular dystrophy, (-) RSD,(-) joint pain (-)swelling, (-) joint pain,swelling.   Neurologic: (-) epilepsy, (-)seizures,(-) brain tumor,(-) TIA, (-)stroke, (-)headaches, (-)Parkinson disease,(-) memory loss, (-) LOC. Cardiovascular: (-) Chest pain, (-) swelling in legs/feet, (-) SOB, (-) cramping in legs/feet with walking. Constitutional:  The patient is alert and oriented x 3, appears to be stated age and in no distress. Temp 98 °F (36.7 °C)   Ht 5' 6\" (1.676 m)   Wt 186 lb (84.4 kg)   LMP  (LMP Unknown)   BMI 30.02 kg/m²     Skin:  Upon inspection: the skin appears warm, dry and intact. There is not a previous scar over the affected area. There is not any cellulitis, lymphedema or cutaneous lesions noted in the lower extremities. Upon palpation there is no induration noted. Neurologic:  Gait: normal;  Motor exam of the lower extremities show ; quadriceps, hamstrings, foot dorsi and plantar flexors intact R.  5/5 and L. 5/5. Deep tendon reflexes are 2/4 at the knees and 2/4 at the ankles with strong extensor hallicus longus motor strength bilaterally. Sensory to both feet is intact to all sensory roots. Cardiovascular: The vascular exam is normal and is well perfused to distal extremities. Distal pulses DP/PT: R. 2+; L. 2+. There is cap refill noted less than two seconds in all digits. There is not edema of the bilateral lower extremities. There is not varicosities noted in the distal extremities. Lymph:  Upon palpation,  there is no lymphadenopathy noted in bilateral lower extremities. Musculoskeletal:  Gait: antalgic; examination of the nails and digits reveal no cyanosis or clubbing    Lumbar exam:  On visual inspection, there is no deformity of the spine. full range of motion, no tenderness, palpable spasm or pain on motion. Special tests: Straight Leg Raise negative, Donna testnegative. Hip exam:  Upon inspection, there is no deformity noted. Upon palpation there is not tenderness. ROM: is   full and semetrical.   Strength: Hip Flexors 5/5; Hip Abductors 5/5; Hip Adduction 5/5.      Knee exam:  Right knee exam shows;  range of motion of R. Knee is 0 to 120, and L. Knee is 0 to 120. She does have  pain on motion, effusion is mild, there is tenderness over the  medial region, there are not any masses, there is not ligamentous instability, there is  deformity noted. Knee exam: right positive for moderate crepitations, some mild tenderness laxity is not noted with  stress. R. Knee:  Lachman's negative, Anterior Drawer negative, Posterior Drawer negative  Zohra's negative, Thallasy  negative,   PF grind test negative, Apprehension test negative, Patellar J sign  negative  L. Knee:  Lachman's negative, Anterior Drawer negative, Posterior Drawer negative  Zohra's negative, Thallasy  negative,   PF grind test negative, Apprehension test negative,  Patellar J sign  negative    Xrays: There is no fracture dislocation of the right knee       Mild degenerative changes of the right knee. MRI:   N/a  Radiographic findings reviewed with patient    Impression:  Encounter Diagnosis   Name Primary? Primary osteoarthritis of right knee Yes       Plan:   Natural history and expected course discussed. Questions answered. Educational materials distributed. Rest, ice, compression, and elevation (RICE) therapy. I had a lengthy discussion with the patient regarding their diagnosis. I explained treatment options including surgical vs non surgical treatment. I reviewed in detail the risks and benefits and outlined the procedure in detail with expected outcomes and possible complications. I also discussed non surgical treatment such as injections (CSI and visco supplementation), physical therapy, topical creams and NSAID's. They have elected for conservative management at this time. I will proceed with a cortisone injection in the Right knee. Verbal and written consent was obtained for the injections. The skin was prepped with alcohol. 1mL of Kenalog 40mg and 9mL of 0.25% Marcaine was  injected to Right knee. The injection was given through the lateral side of the knee.  The patient tolerated the injection well.  I will see the patient back 4 weeks  HEP

## 2023-01-12 ENCOUNTER — OFFICE VISIT (OUTPATIENT)
Dept: ORTHOPEDIC SURGERY | Age: 83
End: 2023-01-12
Payer: MEDICARE

## 2023-01-12 VITALS — BODY MASS INDEX: 29.89 KG/M2 | HEIGHT: 66 IN | WEIGHT: 186 LBS | TEMPERATURE: 98 F

## 2023-01-12 DIAGNOSIS — S52.591A OTHER CLOSED FRACTURE OF DISTAL END OF RIGHT RADIUS, INITIAL ENCOUNTER: Primary | ICD-10-CM

## 2023-01-12 PROCEDURE — 1090F PRES/ABSN URINE INCON ASSESS: CPT | Performed by: ORTHOPAEDIC SURGERY

## 2023-01-12 PROCEDURE — 1036F TOBACCO NON-USER: CPT | Performed by: ORTHOPAEDIC SURGERY

## 2023-01-12 PROCEDURE — G8399 PT W/DXA RESULTS DOCUMENT: HCPCS | Performed by: ORTHOPAEDIC SURGERY

## 2023-01-12 PROCEDURE — 99212 OFFICE O/P EST SF 10 MIN: CPT | Performed by: ORTHOPAEDIC SURGERY

## 2023-01-12 PROCEDURE — G8427 DOCREV CUR MEDS BY ELIG CLIN: HCPCS | Performed by: ORTHOPAEDIC SURGERY

## 2023-01-12 PROCEDURE — G8417 CALC BMI ABV UP PARAM F/U: HCPCS | Performed by: ORTHOPAEDIC SURGERY

## 2023-01-12 PROCEDURE — 1123F ACP DISCUSS/DSCN MKR DOCD: CPT | Performed by: ORTHOPAEDIC SURGERY

## 2023-01-12 PROCEDURE — G8484 FLU IMMUNIZE NO ADMIN: HCPCS | Performed by: ORTHOPAEDIC SURGERY

## 2023-01-12 NOTE — PROGRESS NOTES
Ms. Gaudencio Yancey returns today for follow-up of a right distal radius fracture which was treated with ORIF . Date of surgery was 8/29/22. Last week she had increased pain and redness over her incision and it was very painful. She states she couldn't even touch it. Physical Exam:  RUE - Skin intact with healed incision         Nontender to palpation at the area of the fracture site. ROM   full          The incision is healing well without evidence of infection. Pulses are intact and symmetric bilaterally         Strength intact          Sensation intact    Xrays:  Not performed today. Radiographic findings reviewed with patient    Impression:   Encounter Diagnosis   Name Primary? Other closed fracture of distal end of right radius, initial encounter Yes           Plan:   I suspect she has some tendonitis that was inflamed at the time. I do not see any evidence of infection or other abnormalities.

## 2023-01-31 ENCOUNTER — OFFICE VISIT (OUTPATIENT)
Dept: ORTHOPEDIC SURGERY | Age: 83
End: 2023-01-31
Payer: MEDICARE

## 2023-01-31 VITALS — TEMPERATURE: 98 F | WEIGHT: 186 LBS | HEIGHT: 66 IN | BODY MASS INDEX: 29.89 KG/M2

## 2023-01-31 DIAGNOSIS — Z96.651 S/P TOTAL KNEE ARTHROPLASTY, RIGHT: Primary | ICD-10-CM

## 2023-01-31 PROCEDURE — 1123F ACP DISCUSS/DSCN MKR DOCD: CPT | Performed by: ORTHOPAEDIC SURGERY

## 2023-01-31 PROCEDURE — G8484 FLU IMMUNIZE NO ADMIN: HCPCS | Performed by: ORTHOPAEDIC SURGERY

## 2023-01-31 PROCEDURE — G8427 DOCREV CUR MEDS BY ELIG CLIN: HCPCS | Performed by: ORTHOPAEDIC SURGERY

## 2023-01-31 PROCEDURE — 99212 OFFICE O/P EST SF 10 MIN: CPT | Performed by: ORTHOPAEDIC SURGERY

## 2023-01-31 PROCEDURE — 1090F PRES/ABSN URINE INCON ASSESS: CPT | Performed by: ORTHOPAEDIC SURGERY

## 2023-01-31 PROCEDURE — 1036F TOBACCO NON-USER: CPT | Performed by: ORTHOPAEDIC SURGERY

## 2023-01-31 PROCEDURE — G8417 CALC BMI ABV UP PARAM F/U: HCPCS | Performed by: ORTHOPAEDIC SURGERY

## 2023-01-31 PROCEDURE — G8399 PT W/DXA RESULTS DOCUMENT: HCPCS | Performed by: ORTHOPAEDIC SURGERY

## 2023-01-31 NOTE — PROGRESS NOTES
Chief Complaint   Patient presents with    Knee Pain     Right knee pain follow up. Knee continues to show some improvement since the fall. Ezra Zuluaga returns today for follow-up of her right knee pain.  Sshe is doing well, no complaints  Past Medical History:   Diagnosis Date    Asthma     Hernia     Hypertension      Past Surgical History:   Procedure Laterality Date    BLADDER SURGERY      BREAST SURGERY Left     benign    DILATION AND CURETTAGE OF UTERUS      FRACTURE SURGERY      right arm    HYSTERECTOMY (CERVIX STATUS UNKNOWN)      KNEE CARTILAGE SURGERY      WRIST FRACTURE SURGERY Right 8/29/2022    RIGHT WRIST OPEN REDUCTION INTERNAL FIXATION VS. APPLICATION EXTERNAL FIXATOR DISTAL RADIUS FRACTURE performed by Sybil Severance, DO at [address]       Current Outpatient Medications:     atorvastatin (LIPITOR) 10 MG tablet, Take 1 tablet by mouth daily, Disp: 90 tablet, Rfl: 0    Cholecalciferol (VITAMIN D3) 50 MCG (2000 UT) CAPS, Take 1 capsule by mouth daily, Disp: , Rfl:     Multiple Vitamins-Minerals (CENTRUM SILVER PO), Take 1 tablet by mouth daily, Disp: , Rfl:     Homeopathic Products (LAVENDER REJUVENATING) OIL, Inhale into the lungs as needed Inhales when she feels an asthma attack starting., Disp: , Rfl:     Polyethyl Glycol-Propyl Glycol (SYSTANE OP), Apply 1 drop to eye 4 times daily Both eyes, Disp: , Rfl:     Sodium Chloride, Hypertonic, (YADIRA 128 OP), Apply 1 drop to eye daily, Disp: , Rfl:     Handicap Placard MISC, by Does not apply route Patient cannot walk 200 ft without stopping to rest.   Expiration 11/18/2027, Disp: 1 each, Rfl: 0    lisinopril-hydroCHLOROthiazide (PRINZIDE;ZESTORETIC) 10-12.5 MG per tablet, Take 1 tablet by mouth in the morning., Disp: 90 tablet, Rfl: 1    albuterol sulfate  (90 BASE) MCG/ACT inhaler, Inhale 2 puffs into the lungs every 6 hours as needed for Wheezing, Disp: , Rfl:     vitamin B-12 (CYANOCOBALAMIN) 1000 MCG tablet, Take 1,000 mcg by mouth 2x/weekly, Disp: , Rfl:     aspirin 81 MG chewable tablet, Take 81 mg by mouth daily, Disp: , Rfl:     COCONUT OIL PO, Take 1,000 mg by mouth 2 tablets daily, Disp: , Rfl:   No Known Allergies  Social History     Socioeconomic History    Marital status: Single     Spouse name: Not on file    Number of children: Not on file    Years of education: Not on file    Highest education level: Not on file   Occupational History    Not on file   Tobacco Use    Smoking status: Former     Packs/day: 0.40     Years: 20.00     Pack years: 8.00     Types: Cigarettes     Quit date: 38     Years since quittin.1    Smokeless tobacco: Never   Substance and Sexual Activity    Alcohol use: No     Alcohol/week: 0.0 standard drinks    Drug use: No    Sexual activity: Not on file   Other Topics Concern    Not on file   Social History Narrative    Not on file     Social Determinants of Health     Financial Resource Strain: Low Risk     Difficulty of Paying Living Expenses: Not hard at all   Food Insecurity: No Food Insecurity    Worried About Running Out of Food in the Last Year: Never true    Ran Out of Food in the Last Year: Never true   Transportation Needs: Not on file   Physical Activity: Not on file   Stress: Not on file   Social Connections: Not on file   Intimate Partner Violence: Not on file   Housing Stability: Not on file     Family History   Problem Relation Age of Onset    Cancer Father     Lung Cancer Father     Cancer Sister     Kidney Cancer Sister     Cancer Brother     Kidney Cancer Brother        Review of Systems:     Skin: (-) rash,(-) psoriasis,(-) eczema, (-)skin cancer. Musculoskeletal: (-) fractures,  (-) dislocations,(-) collagen vascular disease, (-) fibromyalgia, (-) multiple sclerosis, (-) muscular dystrophy, (-) RSD,(-) joint pain (-)swelling, (-) joint pain,swelling.   Neurologic: (-) epilepsy, (-)seizures,(-) brain tumor,(-) TIA, (-)stroke, (-)headaches, (-)Parkinson disease,(-) memory loss, (-) LOC.  Cardiovascular: (-) Chest pain, (-) swelling in legs/feet, (-) SOB, (-) cramping in legs/feet with walking. Constitutional:  The patient is alert and oriented x 3, appears to be stated age and in no distress. Temp 98 °F (36.7 °C)   Ht 5' 6\" (1.676 m)   Wt 186 lb (84.4 kg)   LMP  (LMP Unknown)   BMI 30.02 kg/m²     Skin:  Upon inspection: the skin appears warm, dry and intact. There is not a previous scar over the affected area. There is not any cellulitis, lymphedema or cutaneous lesions noted in the lower extremities. Upon palpation there is no induration noted. Neurologic:  Gait: normal;  Motor exam of the lower extremities show ; quadriceps, hamstrings, foot dorsi and plantar flexors intact R.  5/5 and L. 5/5. Deep tendon reflexes are 2/4 at the knees and 2/4 at the ankles with strong extensor hallicus longus motor strength bilaterally. Sensory to both feet is intact to all sensory roots. Cardiovascular: The vascular exam is normal and is well perfused to distal extremities. Distal pulses DP/PT: R. 2+; L. 2+. There is cap refill noted less than two seconds in all digits. There is not edema of the bilateral lower extremities. There is not varicosities noted in the distal extremities. Lymph:  Upon palpation,  there is no lymphadenopathy noted in bilateral lower extremities. Musculoskeletal:  Gait: antalgic; examination of the nails and digits reveal no cyanosis or clubbing    Lumbar exam:  On visual inspection, there is no deformity of the spine. full range of motion, no tenderness, palpable spasm or pain on motion. Special tests: Straight Leg Raise negative, Donna testnegative. Hip exam:  Upon inspection, there is no deformity noted. Upon palpation there is not tenderness. ROM: is   full and semetrical.   Strength: Hip Flexors 5/5; Hip Abductors 5/5; Hip Adduction 5/5. Knee exam:  Right knee exam shows;  range of motion of R. Knee is 0 to 120, and L. Knee is 0 to 120. She does not have  pain on motion, effusion is mild, there is no tenderness over the  medial region, there are not any masses, there is not ligamentous instability, there is  deformity noted. Knee exam: right positive for moderate crepitations, some mild tenderness laxity is not noted with  stress. R. Knee:  Lachman's negative, Anterior Drawer negative, Posterior Drawer negative  Zohra's negative, Thallasy  negative,   PF grind test negative, Apprehension test negative, Patellar J sign  negative  L. Knee:  Lachman's negative, Anterior Drawer negative, Posterior Drawer negative  Zohra's negative, Thallasy  negative,   PF grind test negative, Apprehension test negative,  Patellar J sign  negative    Xrays: There is no fracture dislocation of the right knee       Mild degenerative changes of the right knee. MRI:   N/a  Radiographic findings reviewed with patient    Impression:  Encounter Diagnosis   Name Primary? S/P total knee arthroplasty, right Yes         Plan:   Natural history and expected course discussed. Questions answered. Educational materials distributed. Rest, ice, compression, and elevation (RICE) therapy. I had a lengthy discussion with the patient regarding their diagnosis. I explained treatment options including surgical vs non surgical treatment. I reviewed in detail the risks and benefits and outlined the procedure in detail with expected outcomes and possible complications. I also discussed non surgical treatment such as injections (CSI and visco supplementation), physical therapy, topical creams and NSAID's. They have elected for conservative management at this time.      Hep  Activity as tolerated  Fu prn

## 2023-02-01 DIAGNOSIS — Z12.31 ENCOUNTER FOR SCREENING MAMMOGRAM FOR MALIGNANT NEOPLASM OF BREAST: Primary | ICD-10-CM

## 2023-02-07 ENCOUNTER — OFFICE VISIT (OUTPATIENT)
Dept: FAMILY MEDICINE CLINIC | Age: 83
End: 2023-02-07
Payer: COMMERCIAL

## 2023-02-07 VITALS
WEIGHT: 184.9 LBS | HEIGHT: 66 IN | RESPIRATION RATE: 16 BRPM | SYSTOLIC BLOOD PRESSURE: 110 MMHG | OXYGEN SATURATION: 97 % | DIASTOLIC BLOOD PRESSURE: 62 MMHG | TEMPERATURE: 97.7 F | HEART RATE: 78 BPM | BODY MASS INDEX: 29.72 KG/M2

## 2023-02-07 DIAGNOSIS — R35.0 URINARY FREQUENCY: Primary | ICD-10-CM

## 2023-02-07 DIAGNOSIS — N32.81 OAB (OVERACTIVE BLADDER): ICD-10-CM

## 2023-02-07 LAB
BILIRUBIN, POC: NEGATIVE
BLOOD URINE, POC: NEGATIVE
CLARITY, POC: NORMAL
COLOR, POC: YELLOW
GLUCOSE URINE, POC: NEGATIVE
KETONES, POC: NEGATIVE
LEUKOCYTE EST, POC: NEGATIVE
NITRITE, POC: NEGATIVE
PH, POC: 6.5
PROTEIN, POC: NEGATIVE
SPECIFIC GRAVITY, POC: 1.02
UROBILINOGEN, POC: 0.2

## 2023-02-07 PROCEDURE — 3078F DIAST BP <80 MM HG: CPT | Performed by: FAMILY MEDICINE

## 2023-02-07 PROCEDURE — 1123F ACP DISCUSS/DSCN MKR DOCD: CPT | Performed by: FAMILY MEDICINE

## 2023-02-07 PROCEDURE — 1090F PRES/ABSN URINE INCON ASSESS: CPT | Performed by: FAMILY MEDICINE

## 2023-02-07 PROCEDURE — 3074F SYST BP LT 130 MM HG: CPT | Performed by: FAMILY MEDICINE

## 2023-02-07 PROCEDURE — G8399 PT W/DXA RESULTS DOCUMENT: HCPCS | Performed by: FAMILY MEDICINE

## 2023-02-07 PROCEDURE — 1036F TOBACCO NON-USER: CPT | Performed by: FAMILY MEDICINE

## 2023-02-07 PROCEDURE — G8484 FLU IMMUNIZE NO ADMIN: HCPCS | Performed by: FAMILY MEDICINE

## 2023-02-07 PROCEDURE — 81002 URINALYSIS NONAUTO W/O SCOPE: CPT | Performed by: FAMILY MEDICINE

## 2023-02-07 PROCEDURE — G8427 DOCREV CUR MEDS BY ELIG CLIN: HCPCS | Performed by: FAMILY MEDICINE

## 2023-02-07 PROCEDURE — 99213 OFFICE O/P EST LOW 20 MIN: CPT | Performed by: FAMILY MEDICINE

## 2023-02-07 PROCEDURE — G8417 CALC BMI ABV UP PARAM F/U: HCPCS | Performed by: FAMILY MEDICINE

## 2023-02-07 RX ORDER — TOLTERODINE 4 MG/1
4 CAPSULE, EXTENDED RELEASE ORAL DAILY
Qty: 30 CAPSULE | Refills: 3 | Status: SHIPPED | OUTPATIENT
Start: 2023-02-07

## 2023-02-07 SDOH — ECONOMIC STABILITY: HOUSING INSECURITY
IN THE LAST 12 MONTHS, WAS THERE A TIME WHEN YOU DID NOT HAVE A STEADY PLACE TO SLEEP OR SLEPT IN A SHELTER (INCLUDING NOW)?: NO

## 2023-02-07 SDOH — ECONOMIC STABILITY: FOOD INSECURITY: WITHIN THE PAST 12 MONTHS, YOU WORRIED THAT YOUR FOOD WOULD RUN OUT BEFORE YOU GOT MONEY TO BUY MORE.: NEVER TRUE

## 2023-02-07 SDOH — ECONOMIC STABILITY: INCOME INSECURITY: HOW HARD IS IT FOR YOU TO PAY FOR THE VERY BASICS LIKE FOOD, HOUSING, MEDICAL CARE, AND HEATING?: NOT HARD AT ALL

## 2023-02-07 SDOH — ECONOMIC STABILITY: FOOD INSECURITY: WITHIN THE PAST 12 MONTHS, THE FOOD YOU BOUGHT JUST DIDN'T LAST AND YOU DIDN'T HAVE MONEY TO GET MORE.: NEVER TRUE

## 2023-02-07 ASSESSMENT — ENCOUNTER SYMPTOMS
ABDOMINAL PAIN: 0
SHORTNESS OF BREATH: 0
DIARRHEA: 0
WHEEZING: 0
BLOOD IN STOOL: 0
VOMITING: 0
CONSTIPATION: 0
NAUSEA: 0
COUGH: 0

## 2023-02-07 ASSESSMENT — PATIENT HEALTH QUESTIONNAIRE - PHQ9
1. LITTLE INTEREST OR PLEASURE IN DOING THINGS: 0
2. FEELING DOWN, DEPRESSED OR HOPELESS: 0
SUM OF ALL RESPONSES TO PHQ QUESTIONS 1-9: 0
SUM OF ALL RESPONSES TO PHQ9 QUESTIONS 1 & 2: 0

## 2023-02-07 NOTE — PROGRESS NOTES
Lucho Saunders (:  1940) is a [de-identified] y.o. female,Established patient, here for evaluation of the following chief complaint(s):  Urinary Frequency (Frequent urination for awhile and never inquired until now)         ASSESSMENT/PLAN:  1. Urinary frequency  -     tolterodine (DETROL LA) 4 MG extended release capsule; Take 1 capsule by mouth daily, Disp-30 capsule, R-3Normal  2. OAB (overactive bladder)  -     tolterodine (DETROL LA) 4 MG extended release capsule; Take 1 capsule by mouth daily, Disp-30 capsule, R-3Normal    No follow-ups on file. Subjective   SUBJECTIVE/OBJECTIVE:  Urinary Frequency (Frequent urination for awhile and never inquired until now)  Denies fever chills or shakes    Some SOB when walking down the tuttle. Review of Systems   Constitutional:  Negative for chills, diaphoresis and fever. HENT:  Negative for ear discharge, ear pain, hearing loss, nosebleeds and tinnitus. Respiratory:  Negative for cough, shortness of breath and wheezing. Cardiovascular:  Negative for chest pain. Gastrointestinal:  Negative for abdominal pain, blood in stool, constipation, diarrhea, nausea and vomiting. Genitourinary:  Negative for dysuria, flank pain and hematuria. Musculoskeletal:  Negative for myalgias. Skin:  Negative for rash. Neurological:  Negative for headaches. Hematological:  Does not bruise/bleed easily. Psychiatric/Behavioral:  Negative for hallucinations and suicidal ideas. Objective   /62   Pulse 78   Temp 97.7 °F (36.5 °C)   Resp 16   Ht 5' 6\" (1.676 m)   Wt 184 lb 14.4 oz (83.9 kg)   LMP  (LMP Unknown)   SpO2 97%   BMI 29.84 kg/m²   No results found for: LABA1C  Physical Exam  Constitutional:       General: She is not in acute distress. Appearance: She is well-developed. HENT:      Head: Normocephalic. Right Ear: Tympanic membrane normal.      Left Ear: Tympanic membrane normal.   Eyes:      General: No scleral icterus.   Neck: Thyroid: No thyromegaly. Vascular: No JVD. Trachea: No tracheal deviation. Cardiovascular:      Rate and Rhythm: Normal rate and regular rhythm. Heart sounds:     No gallop. Comments: No murmur  Pulmonary:      Effort: No respiratory distress. Breath sounds: No wheezing. Abdominal:      Palpations: Abdomen is soft. Musculoskeletal:         General: No tenderness. Normal range of motion. Comments: Scar right volar forearm     Skin:     General: Skin is warm. Capillary Refill: Capillary refill takes less than 2 seconds. Findings: No erythema. Comments: Slight decreased turgor   Neurological:      General: No focal deficit present. Deep Tendon Reflexes: Reflexes normal.          On this date 2/7/2023 I have spent 24 minutes reviewing previous notes, test results and face to face with the patient discussing the diagnosis and importance of compliance with the treatment plan as well as documenting on the day of the visit. An electronic signature was used to authenticate this note.     --Marce Neal, DO

## 2023-02-24 ENCOUNTER — TELEPHONE (OUTPATIENT)
Dept: FAMILY MEDICINE CLINIC | Age: 83
End: 2023-02-24

## 2023-03-06 ENCOUNTER — OFFICE VISIT (OUTPATIENT)
Dept: FAMILY MEDICINE CLINIC | Age: 83
End: 2023-03-06
Payer: MEDICARE

## 2023-03-06 VITALS
BODY MASS INDEX: 30.82 KG/M2 | HEIGHT: 66 IN | WEIGHT: 191.8 LBS | SYSTOLIC BLOOD PRESSURE: 116 MMHG | DIASTOLIC BLOOD PRESSURE: 60 MMHG

## 2023-03-06 DIAGNOSIS — Z00.00 MEDICARE ANNUAL WELLNESS VISIT, SUBSEQUENT: Primary | ICD-10-CM

## 2023-03-06 PROCEDURE — 1123F ACP DISCUSS/DSCN MKR DOCD: CPT | Performed by: NURSE PRACTITIONER

## 2023-03-06 PROCEDURE — 3074F SYST BP LT 130 MM HG: CPT | Performed by: NURSE PRACTITIONER

## 2023-03-06 PROCEDURE — G0439 PPPS, SUBSEQ VISIT: HCPCS | Performed by: NURSE PRACTITIONER

## 2023-03-06 PROCEDURE — G8484 FLU IMMUNIZE NO ADMIN: HCPCS | Performed by: NURSE PRACTITIONER

## 2023-03-06 PROCEDURE — 3078F DIAST BP <80 MM HG: CPT | Performed by: NURSE PRACTITIONER

## 2023-03-06 ASSESSMENT — PATIENT HEALTH QUESTIONNAIRE - PHQ9
SUM OF ALL RESPONSES TO PHQ QUESTIONS 1-9: 0
SUM OF ALL RESPONSES TO PHQ9 QUESTIONS 1 & 2: 0
SUM OF ALL RESPONSES TO PHQ QUESTIONS 1-9: 0
1. LITTLE INTEREST OR PLEASURE IN DOING THINGS: 0
SUM OF ALL RESPONSES TO PHQ QUESTIONS 1-9: 0
2. FEELING DOWN, DEPRESSED OR HOPELESS: 0
SUM OF ALL RESPONSES TO PHQ QUESTIONS 1-9: 0

## 2023-03-06 ASSESSMENT — LIFESTYLE VARIABLES
HOW MANY STANDARD DRINKS CONTAINING ALCOHOL DO YOU HAVE ON A TYPICAL DAY: PATIENT DOES NOT DRINK
HOW OFTEN DO YOU HAVE A DRINK CONTAINING ALCOHOL: NEVER

## 2023-03-06 NOTE — PATIENT INSTRUCTIONS
Preventing Falls: Care Instructions  Overview     Getting around your home safely can be a challenge if you have injuries or health problems that make it easy for you to fall. Loose rugs and furniture in walkways are among the dangers for many older people who have problems walking or who have poor eyesight. People who have conditions such as arthritis, osteoporosis, or dementia also have to be careful not to fall. You can make your home safer with a few simple measures. Follow-up care is a key part of your treatment and safety. Be sure to make and go to all appointments, and call your doctor if you are having problems. It's also a good idea to know your test results and keep a list of the medicines you take. How can you care for yourself at home? Taking care of yourself  Exercise regularly to improve your strength, muscle tone, and balance. Walk if you can. Swimming may be a good choice if you cannot walk easily. Have your vision and hearing checked each year or any time you notice a change. If you have trouble seeing and hearing, you might not be able to avoid objects and could lose your balance. Know the side effects of the medicines you take. Ask your doctor or pharmacist whether the medicines you take can affect your balance. Sleeping pills or sedatives can affect your balance. Limit the amount of alcohol you drink. Alcohol can impair your balance and other senses. Ask your doctor whether calluses or corns on your feet need to be removed. If you wear loose-fitting shoes because of calluses or corns, you can lose your balance and fall. Talk to your doctor if you have numbness in your feet. You may get dizzy if you do not drink enough water. To prevent dehydration, drink plenty of fluids. Choose water and other clear liquids. If you have kidney, heart, or liver disease and have to limit fluids, talk with your doctor before you increase the amount of fluids you drink.   Preventing falls at home  Remove raised doorway thresholds, throw rugs, and clutter. Repair loose carpet or raised areas in the floor. Move furniture and electrical cords to keep them out of walking paths. Use nonskid floor wax, and wipe up spills right away, especially on ceramic tile floors. If you use a walker or cane, put rubber tips on it. If you use crutches, clean the bottoms of them regularly with an abrasive pad, such as steel wool. Keep your house well lit, especially stairways, porches, and outside walkways. Use night-lights in areas such as hallways and bathrooms. Add extra light switches or use remote switches (such as switches that go on or off when you clap your hands) to make it easier to turn lights on if you have to get up during the night. Install sturdy handrails on stairways. Move items in your cabinets so that the things you use a lot are on the lower shelves (about waist level). Keep a cordless phone and a flashlight with new batteries by your bed. If possible, put a phone in each of the main rooms of your house, or carry a cell phone in case you fall and cannot reach a phone. Or, you can wear a device around your neck or wrist. You push a button that sends a signal for help. Wear low-heeled shoes that fit well and give your feet good support. Use footwear with nonskid soles. Check the heels and soles of your shoes for wear. Repair or replace worn heels or soles. Do not wear socks without shoes on smooth floors, such as wood. Walk on the grass when the sidewalks are slippery. If you live in an area that gets snow and ice in the winter, sprinkle salt on slippery steps and sidewalks. Or ask a family member or friend to do this for you. Preventing falls in the bath  Install grab bars and nonskid mats inside and outside your shower or tub and near the toilet and sinks. Use shower chairs and bath benches. Use a hand-held shower head that will allow you to sit while showering.   Get into a tub or shower by putting the weaker leg in first. Get out of a tub or shower with your strong side first.  Repair loose toilet seats and consider installing a raised toilet seat to make getting on and off the toilet easier. Keep your bathroom door unlocked while you are in the shower. Where can you learn more? Go to http://www.bush.com/ and enter G117 to learn more about \"Preventing Falls: Care Instructions. \"  Current as of: May 4, 2022               Content Version: 13.5  © 7395-5050 Healthwise, [employer]. Care instructions adapted under license by [facility] ([facility]). If you have questions about a medical condition or this instruction, always ask your healthcare professional. [address] any warranty or liability for your use of this information. Learning About Being Active as an Older Adult  Why is being active important as you get older? Being active is one of the best things you can do for your health. And it's never too late to start. Being active--or getting active, if you aren't already--has definite benefits. It can:  Give you more energy,  Keep your mind sharp. Improve balance to reduce your risk of falls. Help you manage chronic illness with fewer medicines. No matter how old you are, how fit you are, or what health problems you have, there is a form of activity that will work for you. And the more physical activity you can do, the better your overall health will be. What kinds of activity can help you stay healthy? Being more active will make your daily activities easier. Physical activity includes planned exercise and things you do in daily life. There are four types of activity:  Aerobic. Doing aerobic activity makes your heart and lungs strong. Includes walking, dancing, and gardening. Aim for at least 2½ hours spread throughout the week. It improves your energy and can help you sleep better. Muscle-strengthening.   This type of activity can help maintain muscle and strengthen bones. Includes climbing stairs, using resistance bands, and lifting or carrying heavy loads. Aim for at least twice a week. It can help protect the knees and other joints. Stretching. Stretching gives you better range of motion in joints and muscles. Includes upper arm stretches, calf stretches, and gentle yoga. Aim for at least twice a week, preferably after your muscles are warmed up from other activities. It can help you function better in daily life. Balancing. This helps you stay coordinated and have good posture. Includes heel-to-toe walking, sneha chi, and certain types of yoga. Aim for at least 3 days a week. It can reduce your risk of falling. Even if you have a hard time meeting the recommendations, it's better to be more active than less active. All activity done in each category counts toward your weekly total. You'd be surprised how daily things like carrying groceries, keeping up with grandchildren, and taking the stairs can add up. What keeps you from being active? If you've had a hard time being more active, you're not alone. Maybe you remember being able to do more. Or maybe you've never thought of yourself as being active. It's frustrating when you can't do the things you want. Being more active can help. What's holding you back? Getting started. Have a goal, but break it into easy tasks. Small steps build into big accomplishments. Staying motivated. If you feel like skipping your activity, remember your goal. Maybe you want to move better and stay independent. Every activity gets you one step closer. Not feeling your best.  Start with 5 minutes of an activity you enjoy. Prove to yourself you can do it. As you get comfortable, increase your time. You may not be where you want to be. But you're in the process of getting there. Everyone starts somewhere. How can you find safe ways to stay active?   Talk with your doctor about any physical challenges you're facing. Make a plan with your doctor if you have a health problem or aren't sure how to get started with activity. If you're already active, ask your doctor if there is anything you should change to stay safe as your body and health change. If you tend to feel dizzy after you take medicine, avoid activity at that time. Try being active before you take your medicine. This will reduce your risk of falls. If you plan to be active at home, make sure to clear your space before you get started. Remove things like TV cords, coffee tables, and throw rugs. It's safest to have plenty of space to move freely. The key to getting more active is to take it slow and steady. Try to improve only a little bit at a time. Pick just one area to improve on at first. And if an activity hurts, stop and talk to your doctor. Where can you learn more? Go to http://www.bush.com/ and enter P600 to learn more about \"Learning About Being Active as an Older Adult. \"  Current as of: October 10, 2022               Content Version: 13.5  © 7237-8206 Healthwise, [employer]. Care instructions adapted under license by [facility] ([facility]). If you have questions about a medical condition or this instruction, always ask your healthcare professional. [place] [address] any warranty or liability for your use of this information. Learning About Dental Care for Older Adults  Dental care for older adults: Overview  Dental care for older people is much the same as for younger adults. But older adults do have concerns that younger adults do not. Older adults may have problems with gum disease and decay on the roots of their teeth. They may need missing teeth replaced or broken fillings fixed. Or they may have dentures that need to be cared for. Some older adults may have trouble holding a toothbrush. You can help remind the person you are caring for to brush and floss their teeth or to clean their dentures.  In some cases, you may need to do the brushing and other dental care tasks. People who have trouble using their hands or who have dementia may need this extra help. How can you help with dental care? Normal dental care  To keep the teeth and gums healthy:  Brush the teeth with fluoride toothpaste twice a day--in the morning and at night--and floss at least once a day. Plaque can quickly build up on the teeth of older adults. Watch for the signs of gum disease. These signs include gums that bleed after brushing or after eating hard foods, such as apples. See a dentist regularly. Many experts recommend checkups every 6 months. Keep the dentist up to date on any new medications the person is taking. Encourage a balanced diet that includes whole grains, vegetables, and fruits, and that is low in saturated fat and sodium. Encourage the person you're caring for not to use tobacco products. They can affect dental and general health. Many older adults have a fixed income and feel that they can't afford dental care. But most [facility] and [facility] have programs in which dentists help older adults by lowering fees. Contact your area's public health offices or  for information about dental care in your area. Using a toothbrush  Older adults with arthritis sometimes have trouble brushing their teeth because they can't easily hold the toothbrush. Their hands and fingers may be stiff, painful, or weak. If this is the case, you can: Offer an electric toothbrush. Enlarge the handle of a non-electric toothbrush by wrapping a sponge, an elastic bandage, or adhesive tape around it. Push the toothbrush handle through a ball made of rubber or soft foam.  Make the handle longer and thicker by taping Popsicle sticks or tongue depressors to it. You may also be able to buy special toothbrushes, toothpaste dispensers, and floss holders.   Your doctor may recommend a soft-bristle toothbrush if the person you care for bleeds easily. Bleeding can happen because of a health problem or from certain medicines. A toothpaste for sensitive teeth may help if the person you care for has sensitive teeth. How do you brush and floss someone's teeth? If the person you are caring for has a hard time cleaning their teeth on their own, you may need to brush and floss their teeth for them. It may be easiest to have the person sit and face away from you, and to sit or stand behind them. That way you can steady their head against your arm as you reach around to floss and brush their teeth. Choose a place that has good lighting and is comfortable for both of you. Before you begin, gather your supplies. You will need gloves, floss, a toothbrush, and a container to hold water if you are not near a sink. Wash and dry your hands well and put on gloves. Start by flossing:  Gently work a piece of floss between each of the teeth toward the gums. A plastic flossing tool may make this easier, and they are available at most [facility]. Curve the floss around each tooth into a U-shape and gently slide it under the gum line. Move the floss firmly up and down several times to scrape off the plaque. After you've finished flossing, throw away the used floss and begin brushing:  Wet the brush and apply toothpaste. Place the brush at a 45-degree angle where the teeth meet the gums. Press firmly, and move the brush in small circles over the surface of the teeth. Be careful not to brush too hard. Vigorous brushing can make the gums pull away from the teeth and can scratch the tooth enamel. Brush all surfaces of the teeth, on the tongue side and on the cheek side. Pay special attention to the front teeth and all surfaces of the back teeth. Brush chewing surfaces with short back-and-forth strokes. After you've finished, help the person rinse the remaining toothpaste from their mouth. Where can you learn more?   Go to http://www.Game Cooks.com/ and enter F944 to learn more about \"Learning About Dental Care for Older Adults. \"  Current as of: June 16, 2022               Content Version: 13.5  © 2006-2022 Healthwise, [email or web address]. Care instructions adapted under license by [facility] ([facility]). If you have questions about a medical condition or this instruction, always ask your healthcare professional. [address] any warranty or liability for your use of this information. Hearing Loss: Care Instructions  Overview     Hearing loss is a sudden or slow decrease in how well you hear. It can range from mild to severe. Permanent hearing loss can occur with aging. It also can happen when you are exposed long-term to loud noise. Examples include listening to loud music, riding motorcycles, or being around other loud machines. Hearing loss can affect your work and home life. It can make you feel lonely or depressed. You may feel that you have lost your independence. But hearing aids and other devices can help you hear better and feel connected to others. Follow-up care is a key part of your treatment and safety. Be sure to make and go to all appointments, and call your doctor if you are having problems. It's also a good idea to know your test results and keep a list of the medicines you take. How can you care for yourself at home? Avoid loud noises whenever possible. This helps keep your hearing from getting worse. Always wear hearing protection around loud noises. Wear a hearing aid as directed. See a professional who can help you pick a hearing aid that fits you. Have hearing tests as your doctor suggests. They can show whether your hearing has changed. Your hearing aid may need to be adjusted. Use other devices as needed. These may include:  Telephone amplifiers and hearing aids that can connect to a television, stereo, radio, or microphone. Devices that use lights or vibrations.  These alert you to the doorbell, a ringing telephone, or a baby monitor. Television closed-captioning. This shows the words at the bottom of the screen. Most new TVs can do this. TTY (text telephone). This lets you type messages back and forth on the telephone instead of talking or listening. These devices are also called TDD. When messages are typed on the keyboard, they are sent over the phone line to a receiving TTY. The message is shown on a monitor. Use text messaging, social media, and email if it is hard for you to communicate by telephone. Try to learn a listening technique called speechreading. It is not lipreading. You pay attention to people's gestures, expressions, posture, and tone of voice. These clues can help you understand what a person is saying. Face the person you are talking to, and have them face you. Make sure the lighting is good. You need to see the other person's face clearly. Think about counseling if you need help to adjust to your hearing loss. When should you call for help? Watch closely for changes in your health, and be sure to contact your doctor if:    You think your hearing is getting worse.     You have new symptoms, such as dizziness or nausea. Where can you learn more? Go to http://www.bush.com/ and enter R798 to learn more about \"Hearing Loss: Care Instructions. \"  Current as of: May 4, 2022               Content Version: 13.5  © 8938-2610 Healthwise, Incorporated. Care instructions adapted under license by [facility] [address] If you have questions about a medical condition or this instruction, always ask your healthcare professional. [place] [address] any warranty or liability for your use of this information. Advance Directives: Care Instructions  Overview  An advance directive is a legal way to state your wishes at the end of your life. It tells your family and your doctor what to do if you can't say what you want. There are two main types of advance directives.  You can change them any time your wishes change. Living will. This form tells your family and your doctor your wishes about life support and other treatment. The form is also called a declaration. Medical power of . This form lets you name a person to make treatment decisions for you when you can't speak for yourself. This person is called a health care agent (health care proxy, health care surrogate). The form is also called a durable power of  for health care. If you do not have an advance directive, decisions about your medical care may be made by a family member, or by a doctor or a  who doesn't know you. It may help to think of an advance directive as a gift to the people who care for you. If you have one, they won't have to make tough decisions by themselves. For more information, including forms for your state, see the [address] website (www.caringinfo.org/planning/advance-directives/). Follow-up care is a key part of your treatment and safety. Be sure to make and go to all appointments, and call your doctor if you are having problems. It's also a good idea to know your test results and keep a list of the medicines you take. What should you include in an advance directive? Many states have a unique advance directive form. (It may ask you to address specific issues.) Or you might use a universal form that's approved by many states. If your form doesn't tell you what to address, it may be hard to know what to include in your advance directive. Use the questions below to help you get started. Who do you want to make decisions about your medical care if you are not able to? What life-support measures do you want if you have a serious illness that gets worse over time or can't be cured? What are you most afraid of that might happen? (Maybe you're afraid of having pain, losing your independence, or being kept alive by machines.)  Where would you prefer to die? (Your home?  A hospital? A nursing home?)  Do you want to donate your organs when you die? Do you want certain Confucianist practices performed before you die? When should you call for help? Be sure to contact your doctor if you have any questions. Where can you learn more? Go to http://www.bush.com/ and enter R264 to learn more about \"Advance Directives: Care Instructions. \"  Current as of: June 16, 2022               Content Version: 13.5  © 2006-2022 [employer]. Care instructions adapted under license by [employer]. If you have questions about a medical condition or this instruction, always ask your healthcare professional. [address] any warranty or liability for your use of this information. Starting a Weight Loss Plan: Care Instructions  Overview     If you're thinking about losing weight, it can be hard to know where to start. Your doctor can help you set up a weight loss plan that best meets your needs. You may want to take a class on nutrition or exercise, or you could join a weight loss support group. If you have questions about how to make changes to your eating or exercise habits, ask your doctor about seeing a registered dietitian or an exercise specialist.  It can be a big challenge to lose weight. But you don't have to make huge changes at once. Make small changes, and stick with them. When those changes become habit, add a few more changes. If you don't think you're ready to make changes right now, try to pick a date in the future. Make an appointment to see your doctor to discuss whether the time is right for you to start a plan. Follow-up care is a key part of your treatment and safety. Be sure to make and go to all appointments, and call your doctor if you are having problems. It's also a good idea to know your test results and keep a list of the medicines you take. How can you care for yourself at home? Set realistic goals.  Many people expect to lose much more weight than is likely. A weight loss of 5% to 10% of your body weight may be enough to improve your health. Get family and friends involved to provide support. Talk to them about why you are trying to lose weight, and ask them to help. They can help by participating in exercise and having meals with you, even if they may be eating something different. Find what works best for you. If you do not have time or do not like to cook, a program that offers meal replacement bars or shakes may be better for you. Or if you like to prepare meals, finding a plan that includes daily menus and recipes may be best.  Ask your doctor about other health professionals who can help you achieve your weight loss goals. A dietitian can help you make healthy changes in your diet. An exercise specialist or  can help you develop a safe and effective exercise program.  A counselor or psychiatrist can help you cope with issues such as depression, anxiety, or family problems that can make it hard to focus on weight loss. Consider joining a support group for people who are trying to lose weight. Your doctor can suggest groups in your area. Where can you learn more? Go to http://www.woods.com/ and enter U357 to learn more about \"Starting a Weight Loss Plan: Care Instructions. \"  Current as of: August 25, 2022               Content Version: 13.5  © 2006-2022 [employer]. Care instructions adapted under license by [employer]. If you have questions about a medical condition or this instruction, always ask your healthcare professional. [place] [address] any warranty or liability for your use of this information. A Healthy Heart: Care Instructions  Your Care Instructions     Coronary artery disease, also called heart disease, occurs when a substance called plaque builds up in the vessels that supply oxygen-rich blood to your heart muscle.  This can narrow the blood vessels and reduce blood flow. A heart attack happens when blood flow is completely blocked. A high-fat diet, smoking, and other factors increase the risk of heart disease. Your doctor has found that you have a chance of having heart disease. You can do lots of things to keep your heart healthy. It may not be easy, but you can change your diet, exercise more, and quit smoking. These steps really work to lower your chance of heart disease. Follow-up care is a key part of your treatment and safety. Be sure to make and go to all appointments, and call your doctor if you are having problems. It's also a good idea to know your test results and keep a list of the medicines you take. How can you care for yourself at home? Diet    Use less salt when you cook and eat. This helps lower your blood pressure. Taste food before salting. Add only a little salt when you think you need it. With time, your taste buds will adjust to less salt.     Eat fewer snack items, fast foods, canned soups, and other high-salt, high-fat, processed foods.     Read food labels and try to avoid saturated and trans fats. They increase your risk of heart disease by raising cholesterol levels.     Limit the amount of solid fat-butter, margarine, and shortening-you eat. Use olive, peanut, or canola oil when you cook. Bake, broil, and steam foods instead of frying them.     Eat a variety of fruit and vegetables every day. Dark green, deep orange, red, or yellow fruits and vegetables are especially good for you. Examples include spinach, carrots, peaches, and berries.     Foods high in fiber can reduce your cholesterol and provide important vitamins and minerals. High-fiber foods include whole-grain cereals and breads, oatmeal, beans, brown rice, citrus fruits, and apples.     Eat lean proteins. Heart-healthy proteins include seafood, lean meats and poultry, eggs, beans, peas, nuts, seeds, and soy products.     Limit drinks and foods with added sugar. These include candy, desserts, and soda pop. Lifestyle changes    If your doctor recommends it, get more exercise. Walking is a good choice. Bit by bit, increase the amount you walk every day. Try for at least 30 minutes on most days of the week. You also may want to swim, bike, or do other activities.     Do not smoke. If you need help quitting, talk to your doctor about stop-smoking programs and medicines. These can increase your chances of quitting for good. Quitting smoking may be the most important step you can take to protect your heart. It is never too late to quit.     Limit alcohol to 2 drinks a day for men and 1 drink a day for women. Too much alcohol can cause health problems.     Manage other health problems such as diabetes, high blood pressure, and high cholesterol. If you think you may have a problem with alcohol or drug use, talk to your doctor. Medicines    Take your medicines exactly as prescribed. Call your doctor if you think you are having a problem with your medicine.     If your doctor recommends aspirin, take the amount directed each day. Make sure you take aspirin and not another kind of pain reliever, such as acetaminophen (Tylenol). When should you call for help? Call 911 if you have symptoms of a heart attack. These may include:    Chest pain or pressure, or a strange feeling in the chest.     Sweating.     Shortness of breath.     Pain, pressure, or a strange feeling in the back, neck, jaw, or upper belly or in one or both shoulders or arms.     Lightheadedness or sudden weakness.     A fast or irregular heartbeat. After you call 911, the  may tell you to chew 1 adult-strength or 2 to 4 low-dose aspirin. Wait for an ambulance. Do not try to drive yourself. Watch closely for changes in your health, and be sure to contact your doctor if you have any problems. Where can you learn more?   Go to http://www.bush.com/ and enter F075 to learn more about \"A Healthy Heart: Care Instructions. \"  Current as of: September 7, 2022               Content Version: 13.5  © 2006-2022 Healthwise, Incorporated. Care instructions adapted under license by [facility] ([facility]). If you have questions about a medical condition or this instruction, always ask your healthcare professional. [address] any warranty or liability for your use of this information. Personalized Preventive Plan for Armando Araiza - 3/6/2023  Medicare offers a range of preventive health benefits. Some of the tests and screenings are paid in full while other may be subject to a deductible, co-insurance, and/or copay. Some of these benefits include a comprehensive review of your medical history including lifestyle, illnesses that may run in your family, and various assessments and screenings as appropriate. After reviewing your medical record and screening and assessments performed today your provider may have ordered immunizations, labs, imaging, and/or referrals for you. A list of these orders (if applicable) as well as your Preventive Care list are included within your After Visit Summary for your review. Other Preventive Recommendations:    A preventive eye exam performed by an eye specialist is recommended every 1-2 years to screen for glaucoma; cataracts, macular degeneration, and other eye disorders. A preventive dental visit is recommended every 6 months. Try to get at least 150 minutes of exercise per week or 10,000 steps per day on a pedometer . Order or download the FREE \"Exercise & Physical Activity: Your Everyday Guide\" from [employer]. Call 7-375.585.4907 or search [employer] online. You need 6894-7952 mg of calcium and 1620-0927 IU of vitamin D per day.  It is possible to meet your calcium requirement with diet alone, but a vitamin D supplement is usually necessary to meet this goal.  When exposed to the sun, use a sunscreen that protects against both UVA and UVB radiation with an SPF of 30 or greater. Reapply every 2 to 3 hours or after sweating, drying off with a towel, or swimming. Always wear a seat belt when traveling in a car. Always wear a helmet when riding a bicycle or motorcycle.

## 2023-03-06 NOTE — PROGRESS NOTES
Medicare Annual Wellness Visit    Shamika Callahan is here for Medicare AWV    Assessment & Plan   Medicare annual wellness visit, subsequent    Recommendations for Preventive Services Due: see orders and patient instructions/AVS.    Will repeat labs in August (CBC,CMP, TSH)    Recommended screening schedule for the next 5-10 years is provided to the patient in written form: see Patient Instructions/AVS.     Return for Medicare Annual Wellness Visit in 1 year. Subjective       Patient's complete Health Risk Assessment and screening values have been reviewed and are found in Flowsheets. The following problems were reviewed today and where indicated follow up appointments were made and/or referrals ordered.     Positive Risk Factor Screenings with Interventions:    Fall Risk:  Do you feel unsteady or are you worried about falling? : (!) yes  2 or more falls in past year?: (!) yes  Fall with injury in past year?: (!) yes     Interventions:    Patient declines any further evaluation or treatment              Weight and Activity:  Physical Activity: Inactive    Days of Exercise per Week: 0 days    Minutes of Exercise per Session: 0 min     On average, how many days per week do you engage in moderate to strenuous exercise (like a brisk walk)?: 0 days  Have you lost any weight without trying in the past 3 months?: No  Body mass index: (!) 30.95    Inactivity Interventions:  Patient comments: plans to try to increase activity  Obesity Interventions:  Patient comments: plans to try to increase acitivity          Dentist Screen:  Have you seen the dentist within the past year?: (!) No    Intervention:  Patient comments: sees dentist regularly    Hearing Screen:  Do you or your family notice any trouble with your hearing that hasn't been managed with hearing aids?: (!) Yes    Interventions:  Patient comments: left ear hearing impairment, right ear normal hearing                       Objective   Vitals:    03/06/23 1510 BP: 116/60   Weight: 191 lb 12.8 oz (87 kg)   Height: 5' 6\" (1.676 m)      Body mass index is 30.96 kg/m². General Appearance: alert and oriented to person, place and time, well developed and well- nourished, in no acute distress  Skin: warm and dry, no rash or erythema  Head: normocephalic and atraumatic  Eyes: pupils equal, round, and reactive to light, extraocular eye movements intact, conjunctivae normal  ENT: tympanic membrane, external ear and ear canal normal bilaterally, nose without deformity, nasal mucosa and turbinates normal without polyps  Neck: supple and non-tender without mass, no thyromegaly or thyroid nodules, no cervical lymphadenopathy  Pulmonary/Chest: clear to auscultation bilaterally- no wheezes, rales or rhonchi, normal air movement, no respiratory distress  Cardiovascular: normal rate, regular rhythm, normal S1 and S2, no murmurs, rubs, clicks, or gallops, distal pulses intact, no carotid bruits  Abdomen: soft, non-tender, non-distended, normal bowel sounds, no masses or organomegaly  Extremities: no cyanosis, clubbing. Trace edema to BLE  Musculoskeletal: normal range of motion, no joint swelling, deformity or tenderness  Neurologic:  no cranial nerve deficit, gait, coordination and speech normal       No Known Allergies  Prior to Visit Medications    Medication Sig Taking?  Authorizing Provider   NONFORMULARY Lume: invisible Cream Deodorant: uses for knee pain Yes Historical Provider, MD   tolterodine (DETROL LA) 4 MG extended release capsule Take 1 capsule by mouth daily Yes Emely Sage, DO   atorvastatin (LIPITOR) 10 MG tablet Take 1 tablet by mouth daily Yes Emely Sage, DO   Cholecalciferol (VITAMIN D3) 50 MCG (2000 UT) CAPS Take 1 capsule by mouth daily Yes Historical Provider, MD   Multiple Vitamins-Minerals (CENTRUM SILVER PO) Take 1 tablet by mouth daily Yes Historical Provider, MD   Homeopathic Products (LAVENDER REJUVENATING) OIL Inhale into the lungs as needed Inhales when she feels an asthma attack starting. Yes Historical Provider, MD   Polyethyl Glycol-Propyl Glycol (SYSTANE OP) Apply 1 drop to eye 4 times daily Both eyes Yes Historical Provider, MD   Sodium Chloride, Hypertonic, (YADIRA 128 OP) Apply 1 drop to eye daily Yes Historical Provider, MD   Handicap Placard MISC by Does not apply route Patient cannot walk 200 ft without stopping to rest.    Expiration 11/18/2027 Yes Genaro Sparrow APRN - CNP   lisinopril-hydroCHLOROthiazide (PRINZIDE;ZESTORETIC) 10-12.5 MG per tablet Take 1 tablet by mouth in the morning.  Yes Jim Wallis DO   albuterol sulfate  (90 BASE) MCG/ACT inhaler Inhale 2 puffs into the lungs every 6 hours as needed for Wheezing Yes Historical Provider, MD   vitamin B-12 (CYANOCOBALAMIN) 1000 MCG tablet Take 1,000 mcg by mouth 2x/weekly Yes Historical Provider, MD   aspirin 81 MG chewable tablet Take 81 mg by mouth daily Yes Historical Provider, MD   COCONUT OIL PO Take 1,000 mg by mouth 2 tablets daily Yes Historical Provider, MD Booker (Including outside providers/suppliers regularly involved in providing care):   Patient Care Team:  Jim Wallis DO as PCP - [address]y Marquez, DO as PCP - [facility]  Sully Lucas MD  [id number] Smith MD as Surgeon (Ophthalmology)     Reviewed and updated this visit:  Allergies  Meds  Med Hx  Surg Hx  Soc Hx  Fam Hx             Parvin Gaspar APRN - CNP

## 2023-03-16 ENCOUNTER — TELEPHONE (OUTPATIENT)
Dept: FAMILY MEDICINE CLINIC | Age: 83
End: 2023-03-16

## 2023-03-16 DIAGNOSIS — I10 ESSENTIAL HYPERTENSION: ICD-10-CM

## 2023-03-16 RX ORDER — LISINOPRIL AND HYDROCHLOROTHIAZIDE 12.5; 1 MG/1; MG/1
1 TABLET ORAL DAILY
Qty: 90 TABLET | Refills: 1 | Status: SHIPPED | OUTPATIENT
Start: 2023-03-16

## 2023-03-16 NOTE — TELEPHONE ENCOUNTER
Pt called and is wondering if she can take Prevagen for memory. \pt stated she bought it, but wanted to check with you before starting it.

## 2023-03-20 ENCOUNTER — OFFICE VISIT (OUTPATIENT)
Dept: FAMILY MEDICINE CLINIC | Age: 83
End: 2023-03-20
Payer: MEDICARE

## 2023-03-20 VITALS
WEIGHT: 193 LBS | SYSTOLIC BLOOD PRESSURE: 124 MMHG | OXYGEN SATURATION: 98 % | DIASTOLIC BLOOD PRESSURE: 68 MMHG | HEIGHT: 66 IN | BODY MASS INDEX: 31.02 KG/M2 | HEART RATE: 75 BPM | RESPIRATION RATE: 18 BRPM | TEMPERATURE: 97.2 F

## 2023-03-20 DIAGNOSIS — M79.671 RIGHT FOOT PAIN: Primary | ICD-10-CM

## 2023-03-20 PROCEDURE — 1123F ACP DISCUSS/DSCN MKR DOCD: CPT | Performed by: NURSE PRACTITIONER

## 2023-03-20 PROCEDURE — 1090F PRES/ABSN URINE INCON ASSESS: CPT | Performed by: NURSE PRACTITIONER

## 2023-03-20 PROCEDURE — G8399 PT W/DXA RESULTS DOCUMENT: HCPCS | Performed by: NURSE PRACTITIONER

## 2023-03-20 PROCEDURE — 99213 OFFICE O/P EST LOW 20 MIN: CPT | Performed by: NURSE PRACTITIONER

## 2023-03-20 PROCEDURE — 3074F SYST BP LT 130 MM HG: CPT | Performed by: NURSE PRACTITIONER

## 2023-03-20 PROCEDURE — 1036F TOBACCO NON-USER: CPT | Performed by: NURSE PRACTITIONER

## 2023-03-20 PROCEDURE — G8484 FLU IMMUNIZE NO ADMIN: HCPCS | Performed by: NURSE PRACTITIONER

## 2023-03-20 PROCEDURE — 3078F DIAST BP <80 MM HG: CPT | Performed by: NURSE PRACTITIONER

## 2023-03-20 PROCEDURE — G8417 CALC BMI ABV UP PARAM F/U: HCPCS | Performed by: NURSE PRACTITIONER

## 2023-03-20 PROCEDURE — G8427 DOCREV CUR MEDS BY ELIG CLIN: HCPCS | Performed by: NURSE PRACTITIONER

## 2023-03-20 RX ORDER — SULFAMETHOXAZOLE AND TRIMETHOPRIM 800; 160 MG/1; MG/1
1 TABLET ORAL 2 TIMES DAILY
Qty: 14 TABLET | Refills: 0 | Status: SHIPPED | OUTPATIENT
Start: 2023-03-20 | End: 2023-03-27

## 2023-03-20 ASSESSMENT — ENCOUNTER SYMPTOMS
SHORTNESS OF BREATH: 0
COLOR CHANGE: 1
WHEEZING: 0
DIARRHEA: 0
VOMITING: 0
COUGH: 0
NAUSEA: 0
CONSTIPATION: 0

## 2023-03-20 ASSESSMENT — PATIENT HEALTH QUESTIONNAIRE - PHQ9
2. FEELING DOWN, DEPRESSED OR HOPELESS: 0
SUM OF ALL RESPONSES TO PHQ QUESTIONS 1-9: 0
1. LITTLE INTEREST OR PLEASURE IN DOING THINGS: 0
SUM OF ALL RESPONSES TO PHQ9 QUESTIONS 1 & 2: 0
SUM OF ALL RESPONSES TO PHQ QUESTIONS 1-9: 0

## 2023-03-20 NOTE — PROGRESS NOTES
Breath sounds: Normal breath sounds. No wheezing or rales. Chest:      Chest wall: No tenderness. Abdominal:      General: Bowel sounds are normal.      Palpations: Abdomen is soft. Tenderness: There is no abdominal tenderness. Musculoskeletal:         General: Tenderness and signs of injury present. No deformity. Comments: Right foot is ecchymotic from mid foot to first 3 toes. She does have blood blisters to right great and second toes. Neurovascular status intact. Lymphadenopathy:      Cervical: No cervical adenopathy. Skin:     General: Skin is warm and dry. Neurological:      Mental Status: She is alert and oriented to person, place, and time. Psychiatric:         Mood and Affect: Mood normal.         Behavior: Behavior normal.      Patient also examined by Dr. Mehran dsouza      An electronic signature was used to authenticate this note.     --MARVIN Diallo - CNP

## 2023-03-21 ENCOUNTER — TELEPHONE (OUTPATIENT)
Dept: FAMILY MEDICINE CLINIC | Age: 83
End: 2023-03-21

## 2023-03-21 NOTE — TELEPHONE ENCOUNTER
Nick tape big toe and second toe, put cotton in between. Needs ortho shoe/case shoe.   I believe she can get one at [facility] with Dr Kaylyn Oakley
Patient aware.
Patient would like to know if it would be okay for her to soak her foot in a warm epsom salt soak. She has been using ice and elevation.
[facility] called regarding RT foot XR pt had done. They found that pt has fractures at the base of 1st and 2nd phalanges. She also has osteopenia, and small plantar calcaneal bone spurs.
Warm not hot water and yes ok
ROS as per HPI, rest 10pt ROS reviewed and negative

## 2023-03-23 ENCOUNTER — HOSPITAL ENCOUNTER (OUTPATIENT)
Dept: MAMMOGRAPHY | Age: 83
Discharge: HOME | End: 2023-03-25
Payer: MEDICARE

## 2023-03-23 DIAGNOSIS — Z12.31 ENCOUNTER FOR SCREENING MAMMOGRAM FOR MALIGNANT NEOPLASM OF BREAST: ICD-10-CM

## 2023-03-23 PROCEDURE — 77063 BREAST TOMOSYNTHESIS BI: CPT

## 2023-03-29 ENCOUNTER — OFFICE VISIT (OUTPATIENT)
Dept: FAMILY MEDICINE CLINIC | Age: 83
End: 2023-03-29
Payer: MEDICARE

## 2023-03-29 VITALS
TEMPERATURE: 97.2 F | WEIGHT: 188.2 LBS | HEIGHT: 66 IN | DIASTOLIC BLOOD PRESSURE: 70 MMHG | HEART RATE: 72 BPM | OXYGEN SATURATION: 97 % | RESPIRATION RATE: 18 BRPM | BODY MASS INDEX: 30.25 KG/M2 | SYSTOLIC BLOOD PRESSURE: 110 MMHG

## 2023-03-29 DIAGNOSIS — S92.911D CLOSED NONDISPLACED FRACTURE OF PHALANX OF TOE OF RIGHT FOOT WITH ROUTINE HEALING, UNSPECIFIED TOE, SUBSEQUENT ENCOUNTER: Primary | ICD-10-CM

## 2023-03-29 PROCEDURE — G8399 PT W/DXA RESULTS DOCUMENT: HCPCS | Performed by: NURSE PRACTITIONER

## 2023-03-29 PROCEDURE — G8427 DOCREV CUR MEDS BY ELIG CLIN: HCPCS | Performed by: NURSE PRACTITIONER

## 2023-03-29 PROCEDURE — 99213 OFFICE O/P EST LOW 20 MIN: CPT | Performed by: NURSE PRACTITIONER

## 2023-03-29 PROCEDURE — 1123F ACP DISCUSS/DSCN MKR DOCD: CPT | Performed by: NURSE PRACTITIONER

## 2023-03-29 PROCEDURE — 3074F SYST BP LT 130 MM HG: CPT | Performed by: NURSE PRACTITIONER

## 2023-03-29 PROCEDURE — 1036F TOBACCO NON-USER: CPT | Performed by: NURSE PRACTITIONER

## 2023-03-29 PROCEDURE — G8484 FLU IMMUNIZE NO ADMIN: HCPCS | Performed by: NURSE PRACTITIONER

## 2023-03-29 PROCEDURE — G8417 CALC BMI ABV UP PARAM F/U: HCPCS | Performed by: NURSE PRACTITIONER

## 2023-03-29 PROCEDURE — 3078F DIAST BP <80 MM HG: CPT | Performed by: NURSE PRACTITIONER

## 2023-03-29 PROCEDURE — 1090F PRES/ABSN URINE INCON ASSESS: CPT | Performed by: NURSE PRACTITIONER

## 2023-03-29 ASSESSMENT — PATIENT HEALTH QUESTIONNAIRE - PHQ9
SUM OF ALL RESPONSES TO PHQ9 QUESTIONS 1 & 2: 0
SUM OF ALL RESPONSES TO PHQ QUESTIONS 1-9: 0
SUM OF ALL RESPONSES TO PHQ QUESTIONS 1-9: 0
2. FEELING DOWN, DEPRESSED OR HOPELESS: 0
SUM OF ALL RESPONSES TO PHQ QUESTIONS 1-9: 0
SUM OF ALL RESPONSES TO PHQ QUESTIONS 1-9: 0
1. LITTLE INTEREST OR PLEASURE IN DOING THINGS: 0

## 2023-03-29 ASSESSMENT — ENCOUNTER SYMPTOMS
DIARRHEA: 0
CONSTIPATION: 0
NAUSEA: 0
VOMITING: 0
SHORTNESS OF BREATH: 0
WHEEZING: 0
COUGH: 0

## 2023-03-29 NOTE — PROGRESS NOTES
Donnie Dawson (:  1940) is a [de-identified] y.o. female,Established patient, here for evaluation of the following chief complaint(s):  Joint Pain (Pain in foot since fall)         ASSESSMENT/PLAN:  1. Closed nondisplaced fracture of phalanx of toe of right foot with routine healing, unspecified toe, subsequent encounter  Gretchen taped with cotton between toes  Limit weight bearing  Contact office if symptoms do not improve as expected or worsen. Return if symptoms worsen or fail to improve. Subjective   SUBJECTIVE/OBJECTIVE:  Patient had recent injury to right foot from a fall. X-rays showed fractures to right great and second toes. Blood blisters have healed. Trying to keep gretchen taped. Limiting weight bearing. Did not get ortho shoe      Review of Systems   Constitutional:  Negative for activity change, appetite change, fatigue and unexpected weight change. Respiratory:  Negative for cough, shortness of breath and wheezing. Cardiovascular:  Negative for chest pain and palpitations. Gastrointestinal:  Negative for constipation, diarrhea, nausea and vomiting. Musculoskeletal:  Positive for arthralgias and gait problem. Neurological:  Negative for weakness, light-headedness and headaches. Objective   /70   Pulse 72   Temp 97.2 °F (36.2 °C) (Temporal)   Resp 18   Ht 5' 6\" (1.676 m)   Wt 188 lb 3.2 oz (85.4 kg)   LMP  (LMP Unknown)   SpO2 97%   BMI 30.38 kg/m²    Physical Exam  Constitutional:       General: She is not in acute distress. Appearance: Normal appearance. She is well-developed. HENT:      Head: Normocephalic and atraumatic. Neck:      Thyroid: No thyromegaly. Trachea: No tracheal deviation. Cardiovascular:      Rate and Rhythm: Normal rate and regular rhythm. Heart sounds: No murmur heard. Pulmonary:      Effort: Pulmonary effort is normal.      Breath sounds: Normal breath sounds. No wheezing or rales. Chest:      Chest wall: No tenderness.

## 2023-03-30 DIAGNOSIS — S92.911D CLOSED NONDISPLACED FRACTURE OF PHALANX OF TOE OF RIGHT FOOT WITH ROUTINE HEALING, UNSPECIFIED TOE, SUBSEQUENT ENCOUNTER: Primary | ICD-10-CM

## 2023-04-17 ENCOUNTER — OFFICE VISIT (OUTPATIENT)
Dept: ORTHOPEDIC SURGERY | Age: 83
End: 2023-04-17

## 2023-04-17 VITALS — BODY MASS INDEX: 30.53 KG/M2 | TEMPERATURE: 98 F | HEIGHT: 66 IN | WEIGHT: 190 LBS

## 2023-04-17 DIAGNOSIS — M17.11 PRIMARY OSTEOARTHRITIS OF RIGHT KNEE: Primary | ICD-10-CM

## 2023-04-18 NOTE — TELEPHONE ENCOUNTER
Patient called for refill of Lipitor. Advised script is at [facility] waiting for her to .   Electronically signed by Baldemar Chaudhry MA on 4/18/2023 at 10:35 AM

## 2023-05-24 ENCOUNTER — TELEPHONE (OUTPATIENT)
Dept: FAMILY MEDICINE CLINIC | Age: 83
End: 2023-05-24

## 2023-05-24 DIAGNOSIS — M25.562 PAIN IN BOTH KNEES, UNSPECIFIED CHRONICITY: Primary | ICD-10-CM

## 2023-05-24 DIAGNOSIS — M17.0 PRIMARY OSTEOARTHRITIS OF BOTH KNEES: Primary | ICD-10-CM

## 2023-05-24 DIAGNOSIS — R53.83 DECREASED STAMINA: ICD-10-CM

## 2023-05-24 DIAGNOSIS — M25.561 PAIN IN BOTH KNEES, UNSPECIFIED CHRONICITY: Primary | ICD-10-CM

## 2023-05-24 NOTE — TELEPHONE ENCOUNTER
Kwaku Palacios is asking for a Physical Therapy order so she can go to [address] [place] [address],[address] at [facility]. They told her we can order Physical Therapy to help with stamina and knee pain.

## 2023-05-24 NOTE — TELEPHONE ENCOUNTER
Richi Rocha stopped to ask for an order for Physical therapy for knee pain and stamina. She did PT at [address] [facility],[address] prior to falling and breaking her arm last year.

## 2023-06-14 PROBLEM — M17.0 PRIMARY OSTEOARTHRITIS OF BOTH KNEES: Status: ACTIVE | Noted: 2023-06-14

## 2023-06-20 ENCOUNTER — OFFICE VISIT (OUTPATIENT)
Dept: FAMILY MEDICINE CLINIC | Age: 83
End: 2023-06-20
Payer: MEDICARE

## 2023-06-20 VITALS
BODY MASS INDEX: 31.15 KG/M2 | RESPIRATION RATE: 16 BRPM | WEIGHT: 193.8 LBS | TEMPERATURE: 97.6 F | OXYGEN SATURATION: 97 % | SYSTOLIC BLOOD PRESSURE: 120 MMHG | HEIGHT: 66 IN | DIASTOLIC BLOOD PRESSURE: 60 MMHG | HEART RATE: 92 BPM

## 2023-06-20 DIAGNOSIS — R06.02 SOB (SHORTNESS OF BREATH): ICD-10-CM

## 2023-06-20 DIAGNOSIS — R06.02 SOB (SHORTNESS OF BREATH): Primary | ICD-10-CM

## 2023-06-20 DIAGNOSIS — R07.81 RIB PAIN: Primary | ICD-10-CM

## 2023-06-20 PROCEDURE — G8417 CALC BMI ABV UP PARAM F/U: HCPCS | Performed by: FAMILY MEDICINE

## 2023-06-20 PROCEDURE — 3074F SYST BP LT 130 MM HG: CPT | Performed by: FAMILY MEDICINE

## 2023-06-20 PROCEDURE — G8399 PT W/DXA RESULTS DOCUMENT: HCPCS | Performed by: FAMILY MEDICINE

## 2023-06-20 PROCEDURE — 1036F TOBACCO NON-USER: CPT | Performed by: FAMILY MEDICINE

## 2023-06-20 PROCEDURE — 99213 OFFICE O/P EST LOW 20 MIN: CPT | Performed by: FAMILY MEDICINE

## 2023-06-20 PROCEDURE — 1090F PRES/ABSN URINE INCON ASSESS: CPT | Performed by: FAMILY MEDICINE

## 2023-06-20 PROCEDURE — 3078F DIAST BP <80 MM HG: CPT | Performed by: FAMILY MEDICINE

## 2023-06-20 PROCEDURE — G8427 DOCREV CUR MEDS BY ELIG CLIN: HCPCS | Performed by: FAMILY MEDICINE

## 2023-06-20 PROCEDURE — 1123F ACP DISCUSS/DSCN MKR DOCD: CPT | Performed by: FAMILY MEDICINE

## 2023-06-20 ASSESSMENT — ENCOUNTER SYMPTOMS
CONSTIPATION: 0
BLOOD IN STOOL: 0
NAUSEA: 0
COUGH: 0
SHORTNESS OF BREATH: 0
WHEEZING: 0
ABDOMINAL PAIN: 0
DIARRHEA: 0
VOMITING: 0

## 2023-06-20 NOTE — PROGRESS NOTES
Tad Ng (:  1940) is a [de-identified] y.o. female,Established patient, here for evaluation of the following chief complaint(s):  Fall (Jinnie Kidney in the grass on Saturday and hurt Left side breast and under arm: Hurts to breath in.)         ASSESSMENT/PLAN:  1. Rib pain  -     XR RIBS RIGHT INCLUDE CHEST (MIN 3 VIEWS); Future  2. SOB (shortness of breath)  -     XR RIBS RIGHT INCLUDE CHEST (MIN 3 VIEWS); Future      No follow-ups on file. Subjective   SUBJECTIVE/OBJECTIVE:  Fall (Jinnie Kidney in the grass on Saturday and hurt Left side breast and under arm: Hurts to breath in.)  Did not go to ED      Review of Systems   Constitutional:  Negative for chills, diaphoresis and fever. HENT:  Negative for ear discharge, ear pain, hearing loss, nosebleeds and tinnitus. Respiratory:  Negative for cough, shortness of breath and wheezing. Cardiovascular:  Negative for chest pain. Gastrointestinal:  Negative for abdominal pain, blood in stool, constipation, diarrhea, nausea and vomiting. Genitourinary:  Negative for dysuria, flank pain and hematuria. Musculoskeletal:  Negative for myalgias. Skin:  Negative for rash. Neurological:  Negative for headaches. Hematological:  Does not bruise/bleed easily. Psychiatric/Behavioral:  Negative for hallucinations and suicidal ideas. Objective   /60   Pulse 92   Temp 97.6 °F (36.4 °C)   Resp 16   Ht 5' 6\" (1.676 m)   Wt 193 lb 12.8 oz (87.9 kg)   LMP  (LMP Unknown)   SpO2 97%   BMI 31.28 kg/m²   No results found for: LABA1C  Physical Exam  Constitutional:       General: She is not in acute distress. Appearance: She is well-developed. HENT:      Head: Normocephalic. Eyes:      General: No scleral icterus. Neck:      Thyroid: No thyromegaly. Vascular: No JVD. Trachea: No tracheal deviation. Cardiovascular:      Rate and Rhythm: Normal rate. Heart sounds:     No gallop.    Pulmonary:      Effort: Pulmonary effort is

## 2023-06-21 ENCOUNTER — TELEPHONE (OUTPATIENT)
Dept: GERIATRIC MEDICINE | Age: 83
End: 2023-06-21

## 2023-06-21 NOTE — TELEPHONE ENCOUNTER
As FYI -- Pt arrived at office, wanting to cancel today's OV appt. States she fell recently, saw PCP, had an x-ray done. Pt c/o left chest & arm pain, & discomfort when she breathes deep or laughs. Offered several times to have Dr Wali Xie assess her but pt declined. States she will go back to PCP's office directly from here. Informed her of the location of the walk-in clinics if Dr Kip Duong is unavailable. Offered to transport her to the car via wheelchair, but pt declined. Provided pt with our phone # so that she can call to reschedule when able.

## 2023-06-23 ENCOUNTER — OFFICE VISIT (OUTPATIENT)
Dept: FAMILY MEDICINE CLINIC | Age: 83
End: 2023-06-23

## 2023-06-23 VITALS
OXYGEN SATURATION: 95 % | HEART RATE: 85 BPM | BODY MASS INDEX: 31.21 KG/M2 | SYSTOLIC BLOOD PRESSURE: 132 MMHG | TEMPERATURE: 97.6 F | DIASTOLIC BLOOD PRESSURE: 70 MMHG | HEIGHT: 66 IN | RESPIRATION RATE: 18 BRPM | WEIGHT: 194.2 LBS

## 2023-06-23 DIAGNOSIS — R07.81 RIB PAIN: Primary | ICD-10-CM

## 2023-06-23 ASSESSMENT — ENCOUNTER SYMPTOMS
VOMITING: 0
SHORTNESS OF BREATH: 0
BACK PAIN: 1
COUGH: 0
NAUSEA: 0
CONSTIPATION: 0
DIARRHEA: 0
WHEEZING: 0

## 2023-06-23 NOTE — PROGRESS NOTES
Normocephalic and atraumatic. Neck:      Thyroid: No thyromegaly. Trachea: No tracheal deviation. Cardiovascular:      Rate and Rhythm: Normal rate and regular rhythm. Heart sounds: Normal heart sounds. No murmur heard. Pulmonary:      Effort: Pulmonary effort is normal. No respiratory distress. Breath sounds: Normal breath sounds. Abdominal:      General: Bowel sounds are normal.      Palpations: Abdomen is soft. Tenderness: There is no abdominal tenderness. Musculoskeletal:         General: Tenderness (left breast/chest) present. Normal range of motion. Cervical back: Neck supple. Lymphadenopathy:      Cervical: No cervical adenopathy. Skin:     General: Skin is warm and dry. Findings: Bruising (to left upper chest) present. Neurological:      Mental Status: She is alert and oriented to person, place, and time. Psychiatric:         Behavior: Behavior normal.          [facility]      An electronic signature was used to authenticate this note.     --MARVIN Brady - CNP

## 2023-06-23 NOTE — TELEPHONE ENCOUNTER
Called pt to check status. Her voice sounds strong. States it no longer hurts when she breathes. Left chest & arm still \"hurt, but not as bad\". Suggested she contact PCP for instructions / care. Provided pt again with our phone # and offered to schedule her here today if needed / desired. States she will call her PCP's office.

## 2023-07-24 ENCOUNTER — OFFICE VISIT (OUTPATIENT)
Dept: GERIATRIC MEDICINE | Age: 83
End: 2023-07-24
Payer: MEDICARE

## 2023-07-24 VITALS
RESPIRATION RATE: 20 BRPM | HEART RATE: 76 BPM | SYSTOLIC BLOOD PRESSURE: 144 MMHG | TEMPERATURE: 99 F | BODY MASS INDEX: 31.64 KG/M2 | DIASTOLIC BLOOD PRESSURE: 70 MMHG | WEIGHT: 196 LBS

## 2023-07-24 DIAGNOSIS — G30.9 ALZHEIMER'S DISEASE, UNSPECIFIED (CODE) (HCC): ICD-10-CM

## 2023-07-24 DIAGNOSIS — G31.84 MCI (MILD COGNITIVE IMPAIRMENT): Primary | ICD-10-CM

## 2023-07-24 PROCEDURE — 99212 OFFICE O/P EST SF 10 MIN: CPT | Performed by: INTERNAL MEDICINE

## 2023-07-24 PROCEDURE — 1090F PRES/ABSN URINE INCON ASSESS: CPT | Performed by: INTERNAL MEDICINE

## 2023-07-24 PROCEDURE — 1036F TOBACCO NON-USER: CPT | Performed by: INTERNAL MEDICINE

## 2023-07-24 PROCEDURE — 3078F DIAST BP <80 MM HG: CPT | Performed by: INTERNAL MEDICINE

## 2023-07-24 PROCEDURE — G8399 PT W/DXA RESULTS DOCUMENT: HCPCS | Performed by: INTERNAL MEDICINE

## 2023-07-24 PROCEDURE — G8417 CALC BMI ABV UP PARAM F/U: HCPCS | Performed by: INTERNAL MEDICINE

## 2023-07-24 PROCEDURE — 3074F SYST BP LT 130 MM HG: CPT | Performed by: INTERNAL MEDICINE

## 2023-07-24 PROCEDURE — 1123F ACP DISCUSS/DSCN MKR DOCD: CPT | Performed by: INTERNAL MEDICINE

## 2023-07-24 PROCEDURE — G8427 DOCREV CUR MEDS BY ELIG CLIN: HCPCS | Performed by: INTERNAL MEDICINE

## 2023-07-24 RX ORDER — DONEPEZIL HYDROCHLORIDE 5 MG/1
5 TABLET, FILM COATED ORAL
Qty: 30 TABLET | Refills: 3 | Status: SHIPPED | OUTPATIENT
Start: 2023-07-24

## 2023-07-24 NOTE — PROGRESS NOTES
Chief Complaint   Patient presents with    6 Month Follow-Up     2nd visit. Last seen in December. Here alone. Temp 99 oral.    Blood Pressure Check     142/76. Repeat 144/70. Dr Edinson Douglas notified of above.

## 2023-07-24 NOTE — PROGRESS NOTES
CC Here for follow up for MCI    Knew Carrol Baird and Misbah she told us the story behind her name Liza Marinelli as a premature baby because she lived weeks in an incubator  At [facility]'s.,incubator invented   Did not  remember how Rosalba  , told her how and she could not remember after 10 minutes.   Had a sibling with SDAT  Impression:  MCI mild   Plan: Consider Donepezil 5 mg a day

## 2023-07-31 PROBLEM — G30.9 ALZHEIMER'S DISEASE, UNSPECIFIED (CODE) (HCC): Status: ACTIVE | Noted: 2023-07-31

## 2023-08-15 ENCOUNTER — TELEPHONE (OUTPATIENT)
Dept: FAMILY MEDICINE CLINIC | Age: 83
End: 2023-08-15

## 2023-08-16 ENCOUNTER — OFFICE VISIT (OUTPATIENT)
Dept: FAMILY MEDICINE CLINIC | Age: 83
End: 2023-08-16
Payer: MEDICARE

## 2023-08-16 VITALS
OXYGEN SATURATION: 97 % | TEMPERATURE: 97.8 F | BODY MASS INDEX: 31.66 KG/M2 | HEART RATE: 65 BPM | DIASTOLIC BLOOD PRESSURE: 76 MMHG | SYSTOLIC BLOOD PRESSURE: 124 MMHG | HEIGHT: 66 IN | WEIGHT: 197 LBS

## 2023-08-16 DIAGNOSIS — I10 ESSENTIAL HYPERTENSION: ICD-10-CM

## 2023-08-16 DIAGNOSIS — N32.81 OAB (OVERACTIVE BLADDER): ICD-10-CM

## 2023-08-16 DIAGNOSIS — M54.2 CERVICALGIA: ICD-10-CM

## 2023-08-16 DIAGNOSIS — M62.84 SARCOPENIA: Primary | ICD-10-CM

## 2023-08-16 PROCEDURE — G8417 CALC BMI ABV UP PARAM F/U: HCPCS | Performed by: FAMILY MEDICINE

## 2023-08-16 PROCEDURE — 99214 OFFICE O/P EST MOD 30 MIN: CPT | Performed by: FAMILY MEDICINE

## 2023-08-16 PROCEDURE — G8399 PT W/DXA RESULTS DOCUMENT: HCPCS | Performed by: FAMILY MEDICINE

## 2023-08-16 PROCEDURE — 3074F SYST BP LT 130 MM HG: CPT | Performed by: FAMILY MEDICINE

## 2023-08-16 PROCEDURE — G8427 DOCREV CUR MEDS BY ELIG CLIN: HCPCS | Performed by: FAMILY MEDICINE

## 2023-08-16 PROCEDURE — 1090F PRES/ABSN URINE INCON ASSESS: CPT | Performed by: FAMILY MEDICINE

## 2023-08-16 PROCEDURE — 1123F ACP DISCUSS/DSCN MKR DOCD: CPT | Performed by: FAMILY MEDICINE

## 2023-08-16 PROCEDURE — 1036F TOBACCO NON-USER: CPT | Performed by: FAMILY MEDICINE

## 2023-08-16 PROCEDURE — 3078F DIAST BP <80 MM HG: CPT | Performed by: FAMILY MEDICINE

## 2023-08-16 ASSESSMENT — ENCOUNTER SYMPTOMS
DIARRHEA: 0
COUGH: 0
SHORTNESS OF BREATH: 0
WHEEZING: 0
BLOOD IN STOOL: 0
NAUSEA: 0
ABDOMINAL PAIN: 0
VOMITING: 0
CONSTIPATION: 0

## 2023-08-18 ENCOUNTER — EVALUATION (OUTPATIENT)
Dept: PHYSICAL THERAPY | Age: 83
End: 2023-08-18
Payer: MEDICARE

## 2023-08-18 DIAGNOSIS — M17.0 PRIMARY OSTEOARTHRITIS OF BOTH KNEES: Primary | ICD-10-CM

## 2023-08-18 DIAGNOSIS — M62.84 SARCOPENIA: ICD-10-CM

## 2023-08-18 PROCEDURE — 97164 PT RE-EVAL EST PLAN CARE: CPT | Performed by: PHYSICAL THERAPIST

## 2023-08-18 NOTE — PROGRESS NOTES
Physical Therapy Daily Treatment Note    Date: 2023  Patient Name: Meri Ward  : 1940   MRN: [id number]  DOInjury: 2022   DOSx: -  Referring Provider: Bipin Ford DO  [address] [facility],  [address] [place] [address]     Medical Diagnosis:     M17.0 (ICD-10-CM) - Primary osteoarthritis of both knees  M62.84 (ICD-10-CM) - Sarcopenia   M54.2 (ICD-10-CM) - Cervicalgia       Cynthia Keyes has pain in her R knee and limps when walking. Xray reveals mild degenerative changes. Her primary problems are pain and weakness that we will address with exercise. She does have significant short term memory issues that may complicate treatment. X = TO BE PERFORMED NEXT VISIT  > = PROGRESS TO THIS    S: pt is mildly confused about her recent medical history, having been here to PT 2 months ago but had to be reminded, states her neck is not hurting her any longer, her chief c/o is of limping, pain in her R knee  O: Discussed anatomy, physiology, body mechanics, principles of loading, and progressive loading/activity. Cervical AROM WNL w/o pain  UE strength and sensation WNL  No tenderness in cervical spine    Sit to  30 seconds: 11 reps  TU.59 seconds  Time 7766-2796     Visit 2 Repeat outcome measure at mid point and end. Pain Pain with activity 8/10     ROM      Modalities         MO   Manual            Stretch      Towel / strap DF, INV, EV   TE      TE   Exercise      Ball / can rolling   TE   Toe curls      Heel slides   TE   LAQ x  TE   SLR x  TE   SAQ x  TE   [] TG  [] Leg Press 2-leg   TE   [] TG  [] Leg Press 1-leg   TE   Hamstring Curl Machine   TE   Knee Extension Machine   TE   Step-ups - FWD x  TA   Step-ups - LAT   TA   Step-ups - BWD   TA   Calf Raises   TA   Sit to stand x  TA   marching x  TA               A:  Tolerated well. Nothing has changed since .  Will proceed with plan established in   P: Continue with rehab plan will continue with plan from

## 2023-08-21 PROBLEM — M62.84 SARCOPENIA: Status: ACTIVE | Noted: 2023-08-21

## 2023-08-28 ENCOUNTER — TELEPHONE (OUTPATIENT)
Dept: PHYSICAL THERAPY | Age: 83
End: 2023-08-28

## 2023-09-07 ENCOUNTER — TREATMENT (OUTPATIENT)
Dept: PHYSICAL THERAPY | Age: 83
End: 2023-09-07

## 2023-09-07 DIAGNOSIS — M17.0 PRIMARY OSTEOARTHRITIS OF BOTH KNEES: Primary | ICD-10-CM

## 2023-09-07 DIAGNOSIS — M62.84 SARCOPENIA: ICD-10-CM

## 2023-09-07 NOTE — PROGRESS NOTES
Armando Adams, PTA    Treatment Charges: Mins Units   Initial Evaluation     Re-Evaluation     Ther Exercise         TE     Manual Therapy     MT     Ther Activities        TA 30 2   Gait Training          GT     Neuro Re-education NR     Modalities     Non-Billable Service Time     Other     Total Time/Units 30 2

## 2023-09-20 ENCOUNTER — TREATMENT (OUTPATIENT)
Dept: PHYSICAL THERAPY | Age: 83
End: 2023-09-20

## 2023-09-20 DIAGNOSIS — M17.0 PRIMARY OSTEOARTHRITIS OF BOTH KNEES: Primary | ICD-10-CM

## 2023-09-20 NOTE — PROGRESS NOTES
Physical Therapy Daily Treatment Note    Date: 2023  Patient Name: Indira Fowler           Call her Theo Munoz \"   : 1940   MRN: [id number]  DOInjury: 2022   DOSx: -  Referring Provider: Sully Flor DO  [facility] [facility],  [address] [place] [address]     Medical Diagnosis:     M17.0 (ICD-10-CM) - Primary osteoarthritis of both knees  M62.84 (ICD-10-CM) - Sarcopenia   M54.2 (ICD-10-CM) - Cervicalgia       Jessica Carpenter has pain in her R knee and limps when walking. Xray reveals mild degenerative changes. Her primary problems are pain and weakness that we will address with exercise. She does have significant short term memory issues that may complicate treatment. X = TO BE PERFORMED NEXT VISIT  > = PROGRESS TO THIS    S: pt  reports feeling much better since last rx session . O: Discussed anatomy, physiology, body mechanics, principles of loading, and progressive loading/activity. Cervical AROM WNL w/o pain  UE strength and sensation WNL  No tenderness in cervical spine    Sit to  30 seconds: 11 reps  TU.59 seconds  Time 3726-4179      Visit 3 visit  Repeat outcome measure at mid point and end. Pain Pain with activity 3/10     ROM      Modalities         MO   Manual            Stretch      Towel / strap DF, INV, EV   TE      TE   Exercise      Nustep  L 5 x 10 min            Ball / can rolling   TE   Toe curls      Heel slides   TE   LAQ 2# x 1.30 min    TE   SLR x  TE   SAQ x  TE   [] TG  [] Leg Press 2-leg   TE   [] TG  [] Leg Press 1-leg   TE   Hamstring Curl Machine   TE   Knee Extension Machine   TE   Step-ups - FWD x  TA   Step-ups - LAT   TA   Step-ups - BWD   TA   Marching  2# x 1.30 min  x TA   Calf Raises 2# X 1.30 min   x TA   Hip ABD  2# X 1.30 min      Sit to stand x  TA      TA               A:  Tolerated well. ( Nothing has changed since . Will proceed with plan established in 2023  ).    P: Continue with rehab plan will continue with plan from

## 2023-09-25 ENCOUNTER — TREATMENT (OUTPATIENT)
Dept: PHYSICAL THERAPY | Age: 83
End: 2023-09-25
Payer: MEDICARE

## 2023-09-25 ENCOUNTER — OFFICE VISIT (OUTPATIENT)
Dept: ORTHOPEDIC SURGERY | Age: 83
End: 2023-09-25
Payer: MEDICARE

## 2023-09-25 VITALS — HEIGHT: 66 IN | TEMPERATURE: 98 F | BODY MASS INDEX: 31.66 KG/M2 | WEIGHT: 197 LBS

## 2023-09-25 DIAGNOSIS — M17.0 PRIMARY OSTEOARTHRITIS OF BOTH KNEES: Primary | ICD-10-CM

## 2023-09-25 DIAGNOSIS — M17.11 PRIMARY OSTEOARTHRITIS OF RIGHT KNEE: Primary | ICD-10-CM

## 2023-09-25 PROCEDURE — 1123F ACP DISCUSS/DSCN MKR DOCD: CPT | Performed by: ORTHOPAEDIC SURGERY

## 2023-09-25 PROCEDURE — 99213 OFFICE O/P EST LOW 20 MIN: CPT | Performed by: ORTHOPAEDIC SURGERY

## 2023-09-25 PROCEDURE — 1036F TOBACCO NON-USER: CPT | Performed by: ORTHOPAEDIC SURGERY

## 2023-09-25 PROCEDURE — G8417 CALC BMI ABV UP PARAM F/U: HCPCS | Performed by: ORTHOPAEDIC SURGERY

## 2023-09-25 PROCEDURE — 20610 DRAIN/INJ JOINT/BURSA W/O US: CPT | Performed by: ORTHOPAEDIC SURGERY

## 2023-09-25 PROCEDURE — 97110 THERAPEUTIC EXERCISES: CPT

## 2023-09-25 PROCEDURE — 1090F PRES/ABSN URINE INCON ASSESS: CPT | Performed by: ORTHOPAEDIC SURGERY

## 2023-09-25 PROCEDURE — G8427 DOCREV CUR MEDS BY ELIG CLIN: HCPCS | Performed by: ORTHOPAEDIC SURGERY

## 2023-09-25 PROCEDURE — 97530 THERAPEUTIC ACTIVITIES: CPT

## 2023-09-25 PROCEDURE — G8399 PT W/DXA RESULTS DOCUMENT: HCPCS | Performed by: ORTHOPAEDIC SURGERY

## 2023-09-25 NOTE — PROGRESS NOTES
Physical Therapy Daily Treatment Note    Date: 2023  Patient Name: Stacy Bravo           Call her Mag Anthony \"   : 1940   MRN: [id number]  DOInjury: 2022   DOSx: -  Referring Provider: Cielo Leigh DO  [address] [facility],  [address] [place] [address]     Medical Diagnosis:     M17.0 (ICD-10-CM) - Primary osteoarthritis of both knees  M62.84 (ICD-10-CM) - Sarcopenia   M54.2 (ICD-10-CM) - Cervicalgia       Cande Alonzo has pain in her R knee and limps when walking. Xray reveals mild degenerative changes. Her primary problems are pain and weakness that we will address with exercise. She does have significant short term memory issues that may complicate treatment. X = TO BE PERFORMED NEXT VISIT  > = PROGRESS TO THIS    S: pt  reports feeling much better since last rx session . O: Discussed anatomy, physiology, body mechanics, principles of loading, and progressive loading/activity. Cervical AROM WNL w/o pain  UE strength and sensation WNL  No tenderness in cervical spine    Sit to  30 seconds: 11 reps  TU.59 seconds  Time 0900- 0930 am     Visit    visits  Repeat outcome measure at mid point and end. Pain Pain with activity 3/10     ROM      Modalities         MO   Manual            Stretch      Towel / strap DF, INV, EV   TE      TE   Exercise      Nustep  L 5 x 10 min            Ball / can rolling   TE   Toe curls      Heel slides   TE   LAQ 2# x 1.30 min    TE   SLR x  TE   SAQ x  TE   [] TG  [] Leg Press 2-leg   TE   [] TG  [] Leg Press 1-leg   TE   Hamstring Curl Machine   TE   Knee Extension Machine   TE   Step-ups - FWD x  TA   Step-ups - LAT   TA   Step-ups - BWD  Pt given all ex's for HEP  TA   Marching  x TA   Calf Raises 2# X 1.30 min   x TA   Hip ABD  2# X 1.30 min      Sit to stand X 10 new   TA      TA               A:  Tolerated well. Pt able to tolerate newly added sit to stand ex as listed . ( Nothing has changed since .  Will proceed with plan

## 2023-11-16 ENCOUNTER — NURSE ONLY (OUTPATIENT)
Dept: FAMILY MEDICINE CLINIC | Age: 83
End: 2023-11-16

## 2023-11-16 DIAGNOSIS — Z23 FLU VACCINE NEED: Primary | ICD-10-CM

## 2024-01-18 ENCOUNTER — OFFICE VISIT (OUTPATIENT)
Dept: FAMILY MEDICINE CLINIC | Age: 84
End: 2024-01-18

## 2024-01-18 VITALS
DIASTOLIC BLOOD PRESSURE: 62 MMHG | OXYGEN SATURATION: 95 % | SYSTOLIC BLOOD PRESSURE: 130 MMHG | TEMPERATURE: 97.2 F | BODY MASS INDEX: 31.81 KG/M2 | HEART RATE: 72 BPM | HEIGHT: 66 IN | RESPIRATION RATE: 16 BRPM

## 2024-01-18 DIAGNOSIS — R06.02 SOB (SHORTNESS OF BREATH): ICD-10-CM

## 2024-01-18 DIAGNOSIS — G30.9 ALZHEIMER'S DISEASE, UNSPECIFIED (CODE) (HCC): ICD-10-CM

## 2024-01-18 DIAGNOSIS — R05.1 ACUTE COUGH: ICD-10-CM

## 2024-01-18 DIAGNOSIS — R05.9 COUGH, UNSPECIFIED TYPE: ICD-10-CM

## 2024-01-18 DIAGNOSIS — U07.1 COVID-19 VIRUS INFECTION: Primary | ICD-10-CM

## 2024-01-18 LAB
INFLUENZA A ANTIBODY: NEGATIVE
INFLUENZA B ANTIBODY: NEGATIVE
Lab: ABNORMAL
PERFORMING INSTRUMENT: ABNORMAL
QC PASS/FAIL: ABNORMAL
S PYO AG THROAT QL: NORMAL
SARS-COV-2, POC: DETECTED

## 2024-01-18 RX ORDER — ALBUTEROL SULFATE 90 UG/1
2 AEROSOL, METERED RESPIRATORY (INHALATION) EVERY 6 HOURS PRN
Qty: 18 G | Refills: 2 | Status: SHIPPED | OUTPATIENT
Start: 2024-01-18

## 2024-01-18 RX ORDER — GUAIFENESIN 600 MG/1
600 TABLET, EXTENDED RELEASE ORAL 2 TIMES DAILY
Qty: 30 TABLET | Refills: 0 | Status: SHIPPED | OUTPATIENT
Start: 2024-01-18

## 2024-01-18 RX ORDER — BENZONATATE 100 MG/1
100 CAPSULE ORAL 3 TIMES DAILY PRN
Qty: 30 CAPSULE | Refills: 0 | Status: SHIPPED | OUTPATIENT
Start: 2024-01-18 | End: 2024-01-25

## 2024-01-18 ASSESSMENT — ENCOUNTER SYMPTOMS
DIARRHEA: 0
WHEEZING: 0
SHORTNESS OF BREATH: 1
VOMITING: 0
SINUS PRESSURE: 0
SORE THROAT: 1
SINUS PAIN: 0
COUGH: 1
NAUSEA: 0

## 2024-01-18 ASSESSMENT — PATIENT HEALTH QUESTIONNAIRE - PHQ9
SUM OF ALL RESPONSES TO PHQ QUESTIONS 1-9: 1
SUM OF ALL RESPONSES TO PHQ9 QUESTIONS 1 & 2: 1
SUM OF ALL RESPONSES TO PHQ QUESTIONS 1-9: 1
1. LITTLE INTEREST OR PLEASURE IN DOING THINGS: 0
2. FEELING DOWN, DEPRESSED OR HOPELESS: 1
SUM OF ALL RESPONSES TO PHQ QUESTIONS 1-9: 1
SUM OF ALL RESPONSES TO PHQ QUESTIONS 1-9: 1

## 2024-01-18 NOTE — PROGRESS NOTES
watery/itchy eyes: less sedating antihistamines, such as loratidine (Claritin), fexofenadine (Allegra) or Cetirizine (Zyrtec)   Drink plenty of fluids and rest.  Practice good hand hygiene.  May sleep with humidifier as needed.  Gargle with warm salt water 3-4 times a day to help with sore throat.  You can use ice, warm chicken noodle soup, soft foods, popsicles and cough drops to help soothe your throat.  Deep breathing exercises hourly  Get up and move around every hour  Sleep on stomach or side not back     Running a humidifier in your bedroom may be helpful for many of your symptoms.  If your cough is keeping you awake at night, you can try raising your head with an extra pillow.  If the skin around your nose and lips becomes sore, you can put some petroleum jelly on the area.       Please contact our office if your symptoms do not improve within 4 days.  Please seek more urgent medical attention if you develop any of the following:  Chest pain  Shortness of breath  Fevers greater than 103  Severe headache   Dizziness  A new rash  Confusion  Extreme weakness     5. Alzheimer's disease, unspecified (CODE) (HCC)  -  The current medical regimen is effective;  continue present plan and medications.       Return if symptoms worsen or fail to improve.         Subjective   SUBJECTIVE/OBJECTIVE:  HPI  Is COVID positive today in office.     /62   Pulse 72   Temp 97.2 °F (36.2 °C)   Resp 16   Ht 1.676 m (5' 5.98\")   LMP  (LMP Unknown)   SpO2 95%   BMI 31.81 kg/m²     Review of Systems   Constitutional:  Positive for activity change (decreased), appetite change (hard to tell) and fatigue. Negative for chills, diaphoresis, fever and unexpected weight change.   HENT:  Positive for congestion (chest) and sore throat. Negative for ear pain, sinus pressure and sinus pain.    Respiratory:  Positive for cough (productive yellow mucous) and shortness of breath (with activity). Negative for wheezing.

## 2024-04-30 DIAGNOSIS — E78.00 PURE HYPERCHOLESTEROLEMIA: ICD-10-CM

## 2024-04-30 DIAGNOSIS — I10 ESSENTIAL HYPERTENSION: ICD-10-CM

## 2024-04-30 RX ORDER — TOLTERODINE 2 MG/1
2 CAPSULE, EXTENDED RELEASE ORAL DAILY
Qty: 30 CAPSULE | Refills: 1 | Status: SHIPPED | OUTPATIENT
Start: 2024-04-30

## 2024-04-30 RX ORDER — ATORVASTATIN CALCIUM 10 MG/1
10 TABLET, FILM COATED ORAL DAILY
Qty: 90 TABLET | Refills: 0 | Status: SHIPPED | OUTPATIENT
Start: 2024-04-30

## 2024-04-30 RX ORDER — LISINOPRIL AND HYDROCHLOROTHIAZIDE 12.5; 1 MG/1; MG/1
1 TABLET ORAL DAILY
Qty: 90 TABLET | Refills: 1 | Status: SHIPPED | OUTPATIENT
Start: 2024-04-30

## 2024-04-30 NOTE — TELEPHONE ENCOUNTER
Requested Prescriptions     Pending Prescriptions Disp Refills    atorvastatin (LIPITOR) 10 MG tablet 90 tablet 0     Sig: Take 1 tablet by mouth daily    tolterodine (DETROL LA) 2 MG extended release capsule 30 capsule 1     Sig: Take 1 capsule by mouth daily       Next appt is Visit date not found  Last appt was 1/18/2024

## 2024-04-30 NOTE — TELEPHONE ENCOUNTER
Requested Prescriptions     Pending Prescriptions Disp Refills    lisinopril-hydroCHLOROthiazide (PRINZIDE;ZESTORETIC) 10-12.5 MG per tablet 90 tablet 1     Sig: Take 1 tablet by mouth daily       Next appt is Visit date not found  Last appt was 1/18/2024

## 2024-05-02 RX ORDER — DONEPEZIL HYDROCHLORIDE 5 MG/1
5 TABLET, FILM COATED ORAL
Qty: 30 TABLET | Refills: 0 | Status: SHIPPED | OUTPATIENT
Start: 2024-05-02

## 2024-05-08 ENCOUNTER — OFFICE VISIT (OUTPATIENT)
Dept: ORTHOPEDIC SURGERY | Age: 84
End: 2024-05-08
Payer: MEDICARE

## 2024-05-08 ENCOUNTER — TELEPHONE (OUTPATIENT)
Dept: ORTHOPEDIC SURGERY | Age: 84
End: 2024-05-08

## 2024-05-08 ENCOUNTER — TELEPHONE (OUTPATIENT)
Dept: GERIATRIC MEDICINE | Age: 84
End: 2024-05-08

## 2024-05-08 VITALS — HEIGHT: 66 IN | BODY MASS INDEX: 31.66 KG/M2 | WEIGHT: 197 LBS | TEMPERATURE: 98 F

## 2024-05-08 DIAGNOSIS — M17.11 PRIMARY OSTEOARTHRITIS OF RIGHT KNEE: Primary | ICD-10-CM

## 2024-05-08 PROCEDURE — G8417 CALC BMI ABV UP PARAM F/U: HCPCS

## 2024-05-08 PROCEDURE — 1090F PRES/ABSN URINE INCON ASSESS: CPT

## 2024-05-08 PROCEDURE — G8427 DOCREV CUR MEDS BY ELIG CLIN: HCPCS

## 2024-05-08 PROCEDURE — 20610 DRAIN/INJ JOINT/BURSA W/O US: CPT

## 2024-05-08 PROCEDURE — 1123F ACP DISCUSS/DSCN MKR DOCD: CPT

## 2024-05-08 PROCEDURE — 99213 OFFICE O/P EST LOW 20 MIN: CPT

## 2024-05-08 PROCEDURE — G8399 PT W/DXA RESULTS DOCUMENT: HCPCS

## 2024-05-08 PROCEDURE — 1036F TOBACCO NON-USER: CPT

## 2024-05-08 RX ORDER — TRIAMCINOLONE ACETONIDE 40 MG/ML
40 INJECTION, SUSPENSION INTRA-ARTICULAR; INTRAMUSCULAR ONCE
Status: COMPLETED | OUTPATIENT
Start: 2024-05-08 | End: 2024-05-08

## 2024-05-08 RX ADMIN — TRIAMCINOLONE ACETONIDE 40 MG: 40 INJECTION, SUSPENSION INTRA-ARTICULAR; INTRAMUSCULAR at 14:35

## 2024-05-08 NOTE — TELEPHONE ENCOUNTER
As FYI -- Pt had missed her February appointment.  Called her to reschedule but she states she is having knee problems at present and will call us back to schedule when she feels up to it.

## 2024-05-08 NOTE — PROGRESS NOTES
Chief Complaint   Patient presents with    Knee Pain     Right knee pain f/u having pain from ankle top buttock.       Kathy Valerio returns today for follow-up of her right knee pain. She stated her knee has been bothering her for about a month. She denied injury. She is ambulating with cane. Previous treatment measures have been successful.    Past Medical History:   Diagnosis Date    Asthma     Hernia     Hypertension      Past Surgical History:   Procedure Laterality Date    BLADDER SURGERY      BREAST SURGERY Left     benign    DILATION AND CURETTAGE OF UTERUS      FRACTURE SURGERY      right arm    HYSTERECTOMY (CERVIX STATUS UNKNOWN)      KNEE CARTILAGE SURGERY      WRIST FRACTURE SURGERY Right 8/29/2022    RIGHT WRIST OPEN REDUCTION INTERNAL FIXATION VS. APPLICATION EXTERNAL FIXATOR DISTAL RADIUS FRACTURE performed by Esa Chong DO at [facility] OR       Current Outpatient Medications:     donepezil (ARICEPT) 5 MG tablet, TAKE 1 TABLET BY MOUTH DAILY (WITH BREAKFAST), Disp: 30 tablet, Rfl: 0    lisinopril-hydroCHLOROthiazide (PRINZIDE;ZESTORETIC) 10-12.5 MG per tablet, Take 1 tablet by mouth daily, Disp: 90 tablet, Rfl: 1    atorvastatin (LIPITOR) 10 MG tablet, Take 1 tablet by mouth daily, Disp: 90 tablet, Rfl: 0    tolterodine (DETROL LA) 2 MG extended release capsule, Take 1 capsule by mouth daily, Disp: 30 capsule, Rfl: 1    albuterol sulfate HFA (PROVENTIL;VENTOLIN;PROAIR) 108 (90 Base) MCG/ACT inhaler, Inhale 2 puffs into the lungs every 6 hours as needed for Wheezing, Disp: 18 g, Rfl: 2    guaiFENesin (MUCINEX) 600 MG extended release tablet, Take 1 tablet by mouth 2 times daily, Disp: 30 tablet, Rfl: 0    Omega-3 Fatty Acids (FISH OIL OMEGA-3 PO), Take by mouth with breakfast and with evening meal, Disp: , Rfl:     NONFORMULARY, Lume: invisible Cream Deodorant: uses for knee pain, Disp: , Rfl:     Cholecalciferol (VITAMIN D3) 50 MCG (2000 UT) CAPS, Take 1 capsule by mouth daily, Disp: , Rfl:

## 2024-05-08 NOTE — TELEPHONE ENCOUNTER
Pt called to see if she can get seen right away. She has an appt on 05/28 at 11:30 am. She is having a lot of pain from right ankle to leg and to her back.  She is having phone issues and is asking to let the phone ring as long as you can so she will be able to answer it. No availability at this time due to pt care. Please contact pt.

## 2024-05-16 ENCOUNTER — OFFICE VISIT (OUTPATIENT)
Dept: ORTHOPEDIC SURGERY | Age: 84
End: 2024-05-16
Payer: MEDICARE

## 2024-05-16 VITALS — WEIGHT: 197 LBS | HEIGHT: 66 IN | BODY MASS INDEX: 31.66 KG/M2 | TEMPERATURE: 98 F

## 2024-05-16 DIAGNOSIS — M48.061 SPINAL STENOSIS OF LUMBAR REGION, UNSPECIFIED WHETHER NEUROGENIC CLAUDICATION PRESENT: Primary | ICD-10-CM

## 2024-05-16 PROCEDURE — G8399 PT W/DXA RESULTS DOCUMENT: HCPCS

## 2024-05-16 PROCEDURE — G8417 CALC BMI ABV UP PARAM F/U: HCPCS

## 2024-05-16 PROCEDURE — 1090F PRES/ABSN URINE INCON ASSESS: CPT

## 2024-05-16 PROCEDURE — G8427 DOCREV CUR MEDS BY ELIG CLIN: HCPCS

## 2024-05-16 PROCEDURE — 1123F ACP DISCUSS/DSCN MKR DOCD: CPT

## 2024-05-16 PROCEDURE — 99213 OFFICE O/P EST LOW 20 MIN: CPT

## 2024-05-16 PROCEDURE — 1036F TOBACCO NON-USER: CPT

## 2024-05-16 RX ORDER — METHYLPREDNISOLONE 4 MG/1
TABLET ORAL
Qty: 1 KIT | Refills: 0 | Status: SHIPPED | OUTPATIENT
Start: 2024-05-16 | End: 2024-05-22

## 2024-05-16 NOTE — PROGRESS NOTES
Chief Complaint   Patient presents with    Knee Pain     Right knee pain follow up. Knee has shown some improvement since last week but she is complaining of pain in shin area and proximal to knee going into hip.        Kathy Valerio returns today for follow-up of her right knee pain. She stated her knee has shown improvement since last week. Per patient she is now complaining of pain that travels down the entire right leg. She does ambulate with cane. She has not taken any medication. She is unable to describe her pain or how long it has been there for. She denied groin pain.    Past Medical History:   Diagnosis Date    Asthma     Hernia     Hypertension      Past Surgical History:   Procedure Laterality Date    BLADDER SURGERY      BREAST SURGERY Left     benign    DILATION AND CURETTAGE OF UTERUS      FRACTURE SURGERY      right arm    HYSTERECTOMY (CERVIX STATUS UNKNOWN)      KNEE CARTILAGE SURGERY      WRIST FRACTURE SURGERY Right 8/29/2022    RIGHT WRIST OPEN REDUCTION INTERNAL FIXATION VS. APPLICATION EXTERNAL FIXATOR DISTAL RADIUS FRACTURE performed by Esa Chong DO at [facility] OR       Current Outpatient Medications:     methylPREDNISolone (MEDROL, NADIA,) 4 MG tablet, Take by mouth., Disp: 1 kit, Rfl: 0    donepezil (ARICEPT) 5 MG tablet, TAKE 1 TABLET BY MOUTH DAILY (WITH BREAKFAST), Disp: 30 tablet, Rfl: 0    lisinopril-hydroCHLOROthiazide (PRINZIDE;ZESTORETIC) 10-12.5 MG per tablet, Take 1 tablet by mouth daily, Disp: 90 tablet, Rfl: 1    atorvastatin (LIPITOR) 10 MG tablet, Take 1 tablet by mouth daily, Disp: 90 tablet, Rfl: 0    tolterodine (DETROL LA) 2 MG extended release capsule, Take 1 capsule by mouth daily, Disp: 30 capsule, Rfl: 1    albuterol sulfate HFA (PROVENTIL;VENTOLIN;PROAIR) 108 (90 Base) MCG/ACT inhaler, Inhale 2 puffs into the lungs every 6 hours as needed for Wheezing, Disp: 18 g, Rfl: 2    guaiFENesin (MUCINEX) 600 MG extended release tablet, Take 1 tablet by mouth 2 times daily,

## 2024-05-23 ENCOUNTER — OFFICE VISIT (OUTPATIENT)
Dept: FAMILY MEDICINE CLINIC | Age: 84
End: 2024-05-23

## 2024-05-23 VITALS
HEIGHT: 66 IN | WEIGHT: 197 LBS | TEMPERATURE: 97.4 F | BODY MASS INDEX: 31.66 KG/M2 | OXYGEN SATURATION: 98 % | RESPIRATION RATE: 18 BRPM | SYSTOLIC BLOOD PRESSURE: 130 MMHG | DIASTOLIC BLOOD PRESSURE: 65 MMHG | HEART RATE: 70 BPM

## 2024-05-23 DIAGNOSIS — M54.6 ACUTE LEFT-SIDED THORACIC BACK PAIN: ICD-10-CM
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
## 2024-05-23 ASSESSMENT — ENCOUNTER SYMPTOMS
EYE PAIN: 0
SHORTNESS OF BREATH: 0
EYE DISCHARGE: 0
COUGH: 0
WHEEZING: 0
ABDOMINAL DISTENTION: 0
SORE THROAT: 0
EYE REDNESS: 0
NAUSEA: 0
BACK PAIN: 1
DIARRHEA: 0
VOMITING: 0
SINUS PRESSURE: 0

## 2024-05-23 NOTE — PROGRESS NOTES
alcohol and does not use drugs.  Family History: family history includes Cancer in her brother, father, and sister; Kidney Cancer in her brother and sister; Lung Cancer in her father.  Allergies: Patient has no known allergies.    Physical Exam   Vital Signs:  /65   Pulse 70   Temp 97.4 °F (36.3 °C) (Temporal)   Resp 18   Ht 1.676 m (5' 5.98\")   Wt 89.4 kg (197 lb)   LMP  (LMP Unknown)   SpO2 98%   BMI 31.81 kg/m²    Oxygen Saturation Interpretation: Normal.    Physical Exam  Vitals and nursing note reviewed.   Constitutional:       Appearance: She is well-developed.   HENT:      Head: Normocephalic and atraumatic.      Right Ear: Hearing and external ear normal.      Left Ear: Hearing and external ear normal.      Nose: Nose normal.      Mouth/Throat:      Pharynx: Uvula midline.   Eyes:      General: Lids are normal.      Conjunctiva/sclera: Conjunctivae normal.      Pupils: Pupils are equal, round, and reactive to light.   Cardiovascular:      Rate and Rhythm: Normal rate and regular rhythm.      Heart sounds: Normal heart sounds. No murmur heard.  Pulmonary:      Effort: Pulmonary effort is normal.      Breath sounds: Normal breath sounds.   Abdominal:      General: Bowel sounds are normal.      Palpations: Abdomen is soft. Abdomen is not rigid.      Tenderness: There is no abdominal tenderness. There is no guarding or rebound.   Musculoskeletal:        Arms:       Cervical back: Normal range of motion and neck supple.      Thoracic back: Swelling, spasms, tenderness and bony tenderness present.   Skin:     General: Skin is warm and dry.      Findings: No abrasion or rash.   Neurological:      Mental Status: She is alert and oriented to person, place, and time.      GCS: GCS eye subscore is 4. GCS verbal subscore is 5. GCS motor subscore is 6.      Cranial Nerves: No cranial nerve deficit.      Sensory: No sensory deficit.      Coordination: Coordination normal.      Gait: Gait normal.         Test

## 2024-05-28 NOTE — PROGRESS NOTES
[facility]     Referral Priority:   Routine     Referral Type:   Eval and Treat     Referral Reason:   Specialty Services Required     Requested Specialty:   Physical Therapist     Number of Visits Requested:   1       Exercise options discussed and encouraged    Activity modifications discussed and recommended    Diagnosis and treatment were significantly limited by the following social determinants of health:  [  ] Financial   [  ] Transportation   [  ] No insurance   [  ] Housing   [  ] Substance abuse   [  ] Lack of support   [ X ] BMI was elevated today, and weight loss plan recommended is : conventional weight loss.  [  ]   Tobacco Use: Medium Risk (6/4/2024)    Patient History     Smoking Tobacco Use: Former     Smokeless Tobacco Use: Never     Passive Exposure: Not on file     [  ] affect on work ability: not applicable.     Case discussed with another provider : no    Follow up 2 months    Melanie Ellis D.O., P.T.  Board Certified Physical Medicine and Rehabilitation  Board Certified Electrodiagnostic Medicine

## 2024-06-04 ENCOUNTER — OFFICE VISIT (OUTPATIENT)
Dept: PHYSICAL MEDICINE AND REHAB | Age: 84
End: 2024-06-04
Payer: MEDICARE

## 2024-06-04 VITALS
DIASTOLIC BLOOD PRESSURE: 63 MMHG | BODY MASS INDEX: 31.81 KG/M2 | HEART RATE: 73 BPM | WEIGHT: 197 LBS | SYSTOLIC BLOOD PRESSURE: 132 MMHG

## 2024-06-04 DIAGNOSIS — S30.1XXS: Primary | ICD-10-CM

## 2024-06-04 PROCEDURE — 99204 OFFICE O/P NEW MOD 45 MIN: CPT | Performed by: PHYSICAL MEDICINE & REHABILITATION

## 2024-06-04 PROCEDURE — G8417 CALC BMI ABV UP PARAM F/U: HCPCS | Performed by: PHYSICAL MEDICINE & REHABILITATION

## 2024-06-04 PROCEDURE — 1036F TOBACCO NON-USER: CPT | Performed by: PHYSICAL MEDICINE & REHABILITATION

## 2024-06-04 PROCEDURE — G8399 PT W/DXA RESULTS DOCUMENT: HCPCS | Performed by: PHYSICAL MEDICINE & REHABILITATION

## 2024-06-04 PROCEDURE — 1090F PRES/ABSN URINE INCON ASSESS: CPT | Performed by: PHYSICAL MEDICINE & REHABILITATION

## 2024-06-04 PROCEDURE — 1123F ACP DISCUSS/DSCN MKR DOCD: CPT | Performed by: PHYSICAL MEDICINE & REHABILITATION

## 2024-06-04 PROCEDURE — 3078F DIAST BP <80 MM HG: CPT | Performed by: PHYSICAL MEDICINE & REHABILITATION

## 2024-06-04 PROCEDURE — 3075F SYST BP GE 130 - 139MM HG: CPT | Performed by: PHYSICAL MEDICINE & REHABILITATION

## 2024-06-04 PROCEDURE — G8427 DOCREV CUR MEDS BY ELIG CLIN: HCPCS | Performed by: PHYSICAL MEDICINE & REHABILITATION

## 2024-06-04 RX ORDER — DICLOFENAC EPOLAMINE 0.01 G/1
1 SYSTEM TOPICAL 2 TIMES DAILY
Qty: 60 PATCH | Refills: 2 | Status: SHIPPED | OUTPATIENT
Start: 2024-06-04

## 2024-06-05 ENCOUNTER — TELEPHONE (OUTPATIENT)
Dept: PHYSICAL MEDICINE AND REHAB | Age: 84
End: 2024-06-05

## 2024-06-05 ENCOUNTER — OFFICE VISIT (OUTPATIENT)
Dept: FAMILY MEDICINE CLINIC | Age: 84
End: 2024-06-05

## 2024-06-05 VITALS
OXYGEN SATURATION: 96 % | DIASTOLIC BLOOD PRESSURE: 76 MMHG | TEMPERATURE: 97.6 F | WEIGHT: 184 LBS | BODY MASS INDEX: 29.57 KG/M2 | HEART RATE: 82 BPM | SYSTOLIC BLOOD PRESSURE: 124 MMHG | HEIGHT: 66 IN

## 2024-06-05 DIAGNOSIS — M17.12 PRIMARY OSTEOARTHRITIS OF LEFT KNEE: ICD-10-CM

## 2024-06-05 DIAGNOSIS — M51.36 DDD (DEGENERATIVE DISC DISEASE), LUMBAR: Primary | ICD-10-CM

## 2024-06-05 RX ORDER — TRIAMCINOLONE ACETONIDE 40 MG/ML
40 INJECTION, SUSPENSION INTRA-ARTICULAR; INTRAMUSCULAR ONCE
Status: COMPLETED | OUTPATIENT
Start: 2024-06-05 | End: 2024-06-05

## 2024-06-05 RX ORDER — LIDOCAINE 50 MG/G
1 PATCH TOPICAL DAILY
Qty: 10 PATCH | Refills: 0 | Status: SHIPPED | OUTPATIENT
Start: 2024-06-05 | End: 2024-06-15

## 2024-06-05 RX ADMIN — TRIAMCINOLONE ACETONIDE 40 MG: 40 INJECTION, SUSPENSION INTRA-ARTICULAR; INTRAMUSCULAR at 11:55

## 2024-06-05 ASSESSMENT — ENCOUNTER SYMPTOMS
WHEEZING: 0
CONSTIPATION: 0
VOMITING: 0
DIARRHEA: 0
SHORTNESS OF BREATH: 0
COUGH: 0
NAUSEA: 0
ABDOMINAL PAIN: 0
BLOOD IN STOOL: 0

## 2024-06-05 NOTE — TELEPHONE ENCOUNTER
----- Message from Melanie Ellis DO sent at 6/4/2024  6:29 PM EDT -----  Reviewed test abnormal, inform patient that it showed curvature and degenerative changes.

## 2024-06-05 NOTE — TELEPHONE ENCOUNTER
Called and spoke with the patient and informed her of the results of her xrays of thoracic spine and lumbar spine.  Patient is aware and voiced understanding.

## 2024-06-05 NOTE — TELEPHONE ENCOUNTER
----- Message from Melanie Ellis DO sent at 6/4/2024  6:28 PM EDT -----  Reviewed test abnormal, inform patient that it showed degenerative changes but no fracture.

## 2024-06-05 NOTE — PROGRESS NOTES
Kathy Valerio (:  1940) is a 83 y.o. female,Established patient, here for evaluation of the following chief complaint(s):  Lower Back Pain (Lower back and knee pain)      Assessment & Plan   ASSESSMENT/PLAN:  1. DDD (degenerative disc disease), lumbar  -     lidocaine (LIDODERM) 5 %; Place 1 patch onto the skin daily for 10 days 12 hours on, 12 hours off., Disp-10 patch, R-0Normal  2. Primary osteoarthritis of left knee  -     [place] ARTHROCENTESIS ASPIR&/INJ MAJOR JT/BURSA W/O US  -     triamcinolone acetonide (KENALOG-40) injection 40 mg; 40 mg, IntraMUSCular, ONCE, 1 dose, On 24 at 1215      No follow-ups on file.         Subjective   SUBJECTIVE/OBJECTIVE:  Lower Back Pain (Lower back and knee pain)          Review of Systems   Constitutional:  Negative for chills, diaphoresis and fever.   HENT:  Negative for ear discharge, ear pain, hearing loss, nosebleeds and tinnitus.    Respiratory:  Negative for cough, shortness of breath and wheezing.    Cardiovascular:  Negative for chest pain.   Gastrointestinal:  Negative for abdominal pain, blood in stool, constipation, diarrhea, nausea and vomiting.   Genitourinary:  Negative for dysuria, flank pain and hematuria.   Musculoskeletal:  Negative for myalgias.   Skin:  Negative for rash.   Neurological:  Negative for headaches.   Hematological:  Does not bruise/bleed easily.   Psychiatric/Behavioral:  Negative for hallucinations and suicidal ideas.           Objective   /76   Pulse 82   Temp 97.6 °F (36.4 °C)   Ht 1.676 m (5' 6\")   Wt 83.5 kg (184 lb)   LMP  (LMP Unknown)   SpO2 96%   BMI 29.70 kg/m²   No results found for: \"LABA1C\"  Physical Exam  Constitutional:       General: She is not in acute distress.     Appearance: She is well-developed.   Eyes:      General: No scleral icterus.  Neck:      Thyroid: No thyromegaly.      Vascular: No JVD.      Trachea: No tracheal deviation.   Cardiovascular:      Heart sounds:      No gallop.

## 2024-06-10 ENCOUNTER — EVALUATION (OUTPATIENT)
Dept: PHYSICAL THERAPY | Age: 84
End: 2024-06-10
Payer: MEDICARE

## 2024-06-10 DIAGNOSIS — S30.1XXS: Primary | ICD-10-CM

## 2024-06-10 PROBLEM — S30.1XXA CONTUSION OF FLANK: Status: ACTIVE | Noted: 2024-06-10

## 2024-06-10 PROCEDURE — 97163 PT EVAL HIGH COMPLEX 45 MIN: CPT | Performed by: PHYSICAL THERAPIST

## 2024-06-10 NOTE — PROGRESS NOTES
Physical Therapy Treatment Note    Date: 6/10/2024  Patient Name: Kathy Valerio  : 1940   MRN: [id number]  DOInjury: unclear  DOSx: --  Referring Provider: [place] [address]     Medical Diagnosis:      Diagnosis Orders   1. Contusion of flank, sequela          Nadia presents with stenosis symptoms; which are pain with weightbearing and limited ability to walk that is relieved by sitting or reduced spinal load.  Recommended physical therapy for this type of problem includes flexion-based ROM, upper extremity supported ambulation, and back strengthening.  Nadia also demonstrates reduced endurance, altered gait, and functional changes which are supported by her TUG and 30-Sec. Chair Stand Test scores.  Therapy will include functional training as well       X = TO BE PERFORMED NEXT VISIT  > = PROGRESS TO THIS    S: See eval  O:   Time 6103-2716     Visit - Repeat outcome measure at mid point and end.    Pain Pain -8/10     ROM See eval     Modalities      MH + ES prn  MO         Manual         MT   ROM      SKTC ?  NR   Seated flexion X  NR         Exercise      UE supported gait  Discussed.  Encourage.             Pepper in seated trunk flexion in between bouts of standing exercise           ROWS: M     TE   Marching   TA   Squats   TE   Step-ups   TE   Calf raises       Standing hip ABD      Sit / Stand                   A:  See eval  P: Continue with rehab plan  Alden Jacinto, PT    Treatment Charges: Mins Units   Initial Evaluation 45 1   Re-Evaluation     Ther Exercise         TE     Manual Therapy     MT     Ther Activities        TA     Gait Training          GT     Neuro Re-education NR     Modalities     Non-Billable Service Time     Other     Total Time/Units 45 1        
  Sit/Stand See 30-Sec. Chair Stand Test below   Squat See above   Double stance, feet together, eyes open Good.   Double stance, feet together, eyes closed Good.   360 degree turns Not tested due to cane use   Step stool touches Unable.     TUG Test:  _30_  Seconds.  Test date: 6/10/2024   Notes: Slow to rise.  Slow gait.  Short, frequent steps with low step height.  Used quad cane.      An older adult who takes ?12 seconds to complete the TUG is at high risk for falling.      30-Sec. Chair Stand Test:  Number:  _0_  Test date: 6/10/2024     Notes: Unable to perform.     Scoring criteria.      Chair Stand--Below Average Scores Age  Men  Women    60-64  < 14  < 12    65-69  < 12  < 11    70-74  < 12  < 10    75-79  < 11  < 10    80-84  < 10  < 9    85-89  < 8  < 8    90-94  < 7  < 4        Special Tests:   Spinal compression to the left reproduces her familiar back, L buttock, and L thigh pain.  L greater trochanter is non-tender.  L buttock non-tender.  Reports pain with palpation of low back.        ASSESSMENT     Nadia presents with stenosis symptoms; which are pain with weightbearing and limited ability to walk that is relieved by sitting or reduced spinal load.  Recommended physical therapy for this type of problem includes flexion-based ROM, upper extremity supported ambulation, and back strengthening.  Nadia also demonstrates reduced endurance, altered gait, and functional changes which are supported by her TUG and 30-Sec. Chair Stand Test scores.  Therapy will include functional training as well    Problems:   Pain 1-8/10 waxing / waning  ROM decreased  Strength decreased   Balance decreased in walking  Limitations with ADLs , walking, stairs, limited tolerance to weightbearing tasks and weightbearing duration    [x] There are no barriers affecting plan of care or recovery    [] Barriers to this patient's plan of care or recovery include:      Domestic Concerns:  [x] No  [] Yes:    Long Term goals (4-6

## 2024-06-12 ENCOUNTER — TELEPHONE (OUTPATIENT)
Dept: PHYSICAL THERAPY | Age: 84
End: 2024-06-12

## 2024-06-12 NOTE — TELEPHONE ENCOUNTER
Pt arrived into dept to cancel appt due to reporting her leg / hip were hurting too much to do ex's this am.  Pt scheduled for next appointment .

## 2024-06-18 ENCOUNTER — TREATMENT (OUTPATIENT)
Dept: PHYSICAL THERAPY | Age: 84
End: 2024-06-18
Payer: MEDICARE

## 2024-06-18 DIAGNOSIS — S30.1XXS: Primary | ICD-10-CM

## 2024-06-18 PROCEDURE — 97110 THERAPEUTIC EXERCISES: CPT

## 2024-06-18 NOTE — PROGRESS NOTES
Physical Therapy Treatment Note    Date: 2024  Patient Name: Kathy Valerio  : 1940   MRN: [id number]  DOInjury: unclear  DOSx: --  Referring Provider: [place] [address]     Medical Diagnosis:      Diagnosis Orders   1. Contusion of flank, sequela          Nadia presents with stenosis symptoms; which are pain with weightbearing and limited ability to walk that is relieved by sitting or reduced spinal load.  Recommended physical therapy for this type of problem includes flexion-based ROM, upper extremity supported ambulation, and back strengthening.  Nadia also demonstrates reduced endurance, altered gait, and functional changes which are supported by her TUG and 30-Sec. Chair Stand Test scores.  Therapy will include functional training as well       X = TO BE PERFORMED NEXT VISIT  > = PROGRESS TO THIS    S: pt reports feeling better today able to walk without using her straight cane .  O:   Time 8964-2571 am     Visit - Repeat outcome measure at mid point and end.    Pain Pain -8/10     ROM See eval     Modalities      MH + ES prn  MO         Manual         MT   ROM      SKTC ?  NR   Seated flexion Next   NR         Exercise      UE supported gait  Discussed.  Encourage.             Pepper in seated trunk flexion in between bouts of standing exercise       Pt given green t band and copies of all ex's as listed 2024     ROWS: M   Green 2 x 15 seated  new   TE   Marching 2 x 15 new   TE   Squats 2 x 15 new   TE   Step-ups   TE   Calf raises  2 x 15 new   TE   Standing hip ABD 2 x 15 new   TE   Sit / Stand       LAQ's  2# 2 x 15 new            A:  pt able to tolerate all ex's as listed with rest periods as needed.   P: Continue with rehab plan  Jignesh Harding PTA    Treatment Charges: Mins Units   Initial Evaluation     Re-Evaluation     Ther Exercise         TE 30 2   Manual Therapy     MT     Ther Activities        TA     Gait Training

## 2024-06-25 ENCOUNTER — TREATMENT (OUTPATIENT)
Dept: PHYSICAL THERAPY | Age: 84
End: 2024-06-25
Payer: MEDICARE

## 2024-06-25 ENCOUNTER — OFFICE VISIT (OUTPATIENT)
Dept: ORTHOPEDIC SURGERY | Age: 84
End: 2024-06-25
Payer: MEDICARE

## 2024-06-25 VITALS — WEIGHT: 184 LBS | HEIGHT: 66 IN | BODY MASS INDEX: 29.57 KG/M2

## 2024-06-25 DIAGNOSIS — M48.061 SPINAL STENOSIS OF LUMBAR REGION, UNSPECIFIED WHETHER NEUROGENIC CLAUDICATION PRESENT: Primary | ICD-10-CM

## 2024-06-25 DIAGNOSIS — S30.1XXS: Primary | ICD-10-CM

## 2024-06-25 PROCEDURE — 99213 OFFICE O/P EST LOW 20 MIN: CPT | Performed by: ORTHOPAEDIC SURGERY

## 2024-06-25 PROCEDURE — 1090F PRES/ABSN URINE INCON ASSESS: CPT | Performed by: ORTHOPAEDIC SURGERY

## 2024-06-25 PROCEDURE — G8417 CALC BMI ABV UP PARAM F/U: HCPCS | Performed by: ORTHOPAEDIC SURGERY

## 2024-06-25 PROCEDURE — G8427 DOCREV CUR MEDS BY ELIG CLIN: HCPCS | Performed by: ORTHOPAEDIC SURGERY

## 2024-06-25 PROCEDURE — 97110 THERAPEUTIC EXERCISES: CPT

## 2024-06-25 PROCEDURE — 1036F TOBACCO NON-USER: CPT | Performed by: ORTHOPAEDIC SURGERY

## 2024-06-25 PROCEDURE — 1123F ACP DISCUSS/DSCN MKR DOCD: CPT | Performed by: ORTHOPAEDIC SURGERY

## 2024-06-25 PROCEDURE — G8399 PT W/DXA RESULTS DOCUMENT: HCPCS | Performed by: ORTHOPAEDIC SURGERY

## 2024-06-25 NOTE — PROGRESS NOTES
Physical Therapy Treatment Note    Date: 2024  Patient Name: Kathy Valerio  : 1940   MRN: [id number]  DOInjury: unclear  DOSx: --  Referring Provider: [place] [address]     Medical Diagnosis:      Diagnosis Orders   1. Contusion of flank, sequela          Nadia presents with stenosis symptoms; which are pain with weightbearing and limited ability to walk that is relieved by sitting or reduced spinal load.  Recommended physical therapy for this type of problem includes flexion-based ROM, upper extremity supported ambulation, and back strengthening.  Nadia also demonstrates reduced endurance, altered gait, and functional changes which are supported by her TUG and 30-Sec. Chair Stand Test scores.  Therapy will include functional training as well       X = TO BE PERFORMED NEXT VISIT  > = PROGRESS TO THIS    S: pt reports feeling good today .   O:   Time 5320-8996 am      Visit 3 /8 Repeat outcome measure at mid point and end.    Pain Pain 1-5/10     ROM See eval     Modalities      MH + ES prn  MO         Manual         MT   ROM      SKTC ?  NR   Seated flexion 1 x 10 new   NR         Exercise      UE supported gait  Discussed.  Encourage.             Pepper in seated trunk flexion in between bouts of standing exercise       Pt given green t band and copies of all ex's as listed 2024     ROWS: M   Green 2 x 15 seated    TE   Marching 2 x 15   TE   Squats 2 x 15   TE   Step-ups   TE   Calf raises  2 x 15   TE   Standing hip ABD 2 x 15  TE   Sit / Stand       LAQ's  2# 2 x 15            A:  pt able to tolerate all ex's as listed with rest periods as needed.   P: Continue with rehab plan  Jignesh Harding PTA    Treatment Charges: Mins Units   Initial Evaluation     Re-Evaluation     Ther Exercise         TE 30 2   Manual Therapy     MT     Ther Activities        TA     Gait Training          GT     Neuro Re-education NR     Modalities     Non-Billable

## 2024-06-25 NOTE — PROGRESS NOTES
Drawer negative  Zohra's negative, Thallasy  negative,   PF grind test negative, Apprehension test negative,  Patellar J sign  negative    Xrays:   None today.    MRI:   N/a  Radiographic findings reviewed with patient    Impression:  Encounter Diagnosis   Name Primary?    Spinal stenosis of lumbar region, unspecified whether neurogenic claudication present Yes         Plan:   Natural history and expected course discussed. Questions answered.  Educational materials distributed.  Rest, ice, compression, and elevation (RICE) therapy.    Continue Pt for low back and follow up with Dr. Ellis  Her knee is doing well.

## 2024-07-02 ENCOUNTER — TREATMENT (OUTPATIENT)
Dept: PHYSICAL THERAPY | Age: 84
End: 2024-07-02
Payer: MEDICARE

## 2024-07-02 DIAGNOSIS — S30.1XXS: Primary | ICD-10-CM

## 2024-07-02 PROCEDURE — 97110 THERAPEUTIC EXERCISES: CPT

## 2024-07-02 NOTE — PROGRESS NOTES
Physical Therapy Treatment Note    Date: 2024  Patient Name: Kathy Valerio  : 1940   MRN: [id number]  DOInjury: unclear  DOSx: --  Referring Provider: [place] [address]     Medical Diagnosis:      Diagnosis Orders   1. Contusion of flank, sequela          Nadia presents with stenosis symptoms; which are pain with weightbearing and limited ability to walk that is relieved by sitting or reduced spinal load.  Recommended physical therapy for this type of problem includes flexion-based ROM, upper extremity supported ambulation, and back strengthening.  Nadia also demonstrates reduced endurance, altered gait, and functional changes which are supported by her TUG and 30-Sec. Chair Stand Test scores.  Therapy will include functional training as well       X = TO BE PERFORMED NEXT VISIT  > = PROGRESS TO THIS    S: pt reports feeling good today .   O:   Time 4579-0474 am     Visit  Repeat outcome measure at mid point and end.    Pain Pain 1-5/10     ROM See eval     Modalities      MH + ES prn  MO         Manual         MT   ROM      SKTC ?  NR   Seated flexion 1 x 10 new   NR         Exercise      UE supported gait  Discussed.  Encourage.             Pepper in seated trunk flexion in between bouts of standing exercise       Pt given green t band and copies of all ex's as listed 2024     ROWS: M   Green x 2 min seated    TE   ROWS: L  Green x 2 min seated      Marching  1# x 1.5 min   TE   Squats x 1.5 min   TE   Step-ups   TE   Calf raises  1# X 1.5 min   TE   Standing hip ABD 1# x 1.5 min   TE         Sit / Stand       LAQ's  2# x 2 min            A:  pt able to tolerate all ex's as listed with rest periods as needed.   P: Continue with rehab plan  Jignesh Harding PTA    Treatment Charges: Mins Units   Initial Evaluation     Re-Evaluation     Ther Exercise         TE 30 2   Manual Therapy     MT     Ther Activities        TA     Gait Training

## 2024-07-05 ENCOUNTER — TREATMENT (OUTPATIENT)
Dept: PHYSICAL THERAPY | Age: 84
End: 2024-07-05

## 2024-07-05 DIAGNOSIS — S30.1XXS: Primary | ICD-10-CM

## 2024-07-05 NOTE — PROGRESS NOTES
Physical Therapy Treatment Note    Date: 2024  Patient Name: Kathy Valerio  : 1940   MRN: [id number]  DOInjury: unclear  DOSx: --  Referring Provider: [place] [address]     Medical Diagnosis:      Diagnosis Orders   1. Contusion of flank, sequela          Nadia presents with stenosis symptoms; which are pain with weightbearing and limited ability to walk that is relieved by sitting or reduced spinal load.  Recommended physical therapy for this type of problem includes flexion-based ROM, upper extremity supported ambulation, and back strengthening.  Nadia also demonstrates reduced endurance, altered gait, and functional changes which are supported by her TUG and 30-Sec. Chair Stand Test scores.  Therapy will include functional training as well       X = TO BE PERFORMED NEXT VISIT  > = PROGRESS TO THIS    S: pt reports feeling good today .   O:   Time 4135-5043 am     Visit  Repeat outcome measure at mid point and end.    Pain Pain 1-5/10     ROM See eval     Modalities      MH + ES prn  MO         Manual         MT   ROM      SKTC ?  NR   Seated flexion 1 x 10   NR         Exercise      UE supported gait  Discussed.  Encourage.             Pepper in seated trunk flexion in between bouts of standing exercise       Pt given green t band and copies of all ex's as listed 2024     ROWS: M   Green x 2 min seated    TE   ROWS: L  Green x 2 min seated      Marching  1# x 1.5 min   TE   Squats x 1.5 min   TE   Step-ups   TE   Calf raises  1# X 1.5 min   TE   Standing hip ABD 1# x 1.5 min   TE         Sit / Stand       LAQ's  2# x 2 min            A:  pt able to tolerate all ex's as listed with rest periods as needed.   P: Continue with rehab plan  Jignesh Harding PTA    Treatment Charges: Mins Units   Initial Evaluation     Re-Evaluation     Ther Exercise         TE 30 2   Manual Therapy     MT     Ther Activities        TA     Gait Training          GT

## 2024-07-11 ENCOUNTER — HOSPITAL ENCOUNTER (EMERGENCY)
Age: 84
Discharge: HOME | End: 2024-07-12
Attending: EMERGENCY MEDICINE
Payer: MEDICARE

## 2024-07-11 ENCOUNTER — APPOINTMENT (OUTPATIENT)
Dept: CT IMAGING | Age: 84
End: 2024-07-11
Payer: MEDICARE

## 2024-07-11 DIAGNOSIS — N39.0 ACUTE UTI: ICD-10-CM

## 2024-07-11 DIAGNOSIS — S32.10XA CLOSED FRACTURE OF SACRUM, UNSPECIFIED PORTION OF SACRUM, INITIAL ENCOUNTER (HCC): Primary | ICD-10-CM

## 2024-07-11 LAB
ALBUMIN SERPL-MCNC: 3.6 G/DL (ref 3.5–5.2)
ALP SERPL-CCNC: 234 U/L (ref 35–104)
ALT SERPL-CCNC: 14 U/L (ref 0–32)
ANION GAP SERPL CALCULATED.3IONS-SCNC: 9 MMOL/L (ref 7–16)
AST SERPL-CCNC: 28 U/L (ref 0–31)
BACTERIA URNS QL MICRO: ABNORMAL
BASOPHILS # BLD: 0.04 K/UL (ref 0–0.2)
BASOPHILS NFR BLD: 1 % (ref 0–2)
BILIRUB SERPL-MCNC: 0.3 MG/DL (ref 0–1.2)
BILIRUB UR QL STRIP: NEGATIVE
BUN SERPL-MCNC: 20 MG/DL (ref 6–23)
CALCIUM SERPL-MCNC: 8.7 MG/DL (ref 8.6–10.2)
CHLORIDE SERPL-SCNC: 107 MMOL/L (ref 98–107)
CLARITY UR: ABNORMAL
CO2 SERPL-SCNC: 24 MMOL/L (ref 22–29)
COLOR UR: YELLOW
CREAT SERPL-MCNC: 0.9 MG/DL (ref 0.5–1)
EOSINOPHIL # BLD: 0.18 K/UL (ref 0.05–0.5)
EOSINOPHILS RELATIVE PERCENT: 4 % (ref 0–6)
EPI CELLS #/AREA URNS HPF: ABNORMAL /HPF
ERYTHROCYTE [DISTWIDTH] IN BLOOD BY AUTOMATED COUNT: 13 % (ref 11.5–15)
GFR, ESTIMATED: 67 ML/MIN/1.73M2
GLUCOSE SERPL-MCNC: 126 MG/DL (ref 74–99)
GLUCOSE UR STRIP-MCNC: NEGATIVE MG/DL
HCT VFR BLD AUTO: 39.9 % (ref 34–48)
HGB BLD-MCNC: 13.5 G/DL (ref 11.5–15.5)
HGB UR QL STRIP.AUTO: ABNORMAL
IMM GRANULOCYTES # BLD AUTO: <0.03 K/UL (ref 0–0.58)
IMM GRANULOCYTES NFR BLD: 0 % (ref 0–5)
KETONES UR STRIP-MCNC: NEGATIVE MG/DL
LACTATE BLDV-SCNC: 2 MMOL/L (ref 0.5–2.2)
LACTATE BLDV-SCNC: 2.3 MMOL/L (ref 0.5–2.2)
LEUKOCYTE ESTERASE UR QL STRIP: ABNORMAL
LIPASE SERPL-CCNC: 27 U/L (ref 13–60)
LYMPHOCYTES NFR BLD: 1.21 K/UL (ref 1.5–4)
LYMPHOCYTES RELATIVE PERCENT: 23 % (ref 20–42)
MCH RBC QN AUTO: 29.7 PG (ref 26–35)
MCHC RBC AUTO-ENTMCNC: 33.8 G/DL (ref 32–34.5)
MCV RBC AUTO: 87.9 FL (ref 80–99.9)
MONOCYTES NFR BLD: 0.45 K/UL (ref 0.1–0.95)
MONOCYTES NFR BLD: 9 % (ref 2–12)
NEUTROPHILS NFR BLD: 64 % (ref 43–80)
NEUTS SEG NFR BLD: 3.32 K/UL (ref 1.8–7.3)
NITRITE UR QL STRIP: POSITIVE
PH UR STRIP: 6.5 [PH] (ref 5–9)
PLATELET # BLD AUTO: 209 K/UL (ref 130–450)
PMV BLD AUTO: 11 FL (ref 7–12)
POTASSIUM SERPL-SCNC: 4.5 MMOL/L (ref 3.5–5)
PROT SERPL-MCNC: 6.1 G/DL (ref 6.4–8.3)
PROT UR STRIP-MCNC: NEGATIVE MG/DL
RBC # BLD AUTO: 4.54 M/UL (ref 3.5–5.5)
RBC #/AREA URNS HPF: ABNORMAL /HPF
SODIUM SERPL-SCNC: 140 MMOL/L (ref 132–146)
SP GR UR STRIP: 1.01 (ref 1–1.03)
UROBILINOGEN UR STRIP-ACNC: 1 EU/DL (ref 0–1)
WBC #/AREA URNS HPF: ABNORMAL /HPF
WBC OTHER # BLD: 5.2 K/UL (ref 4.5–11.5)

## 2024-07-11 PROCEDURE — 85025 COMPLETE CBC W/AUTO DIFF WBC: CPT

## 2024-07-11 PROCEDURE — 83690 ASSAY OF LIPASE: CPT

## 2024-07-11 PROCEDURE — 72131 CT LUMBAR SPINE W/O DYE: CPT

## 2024-07-11 PROCEDURE — 2580000003 HC RX 258: Performed by: EMERGENCY MEDICINE

## 2024-07-11 PROCEDURE — 6360000002 HC RX W HCPCS: Performed by: EMERGENCY MEDICINE

## 2024-07-11 PROCEDURE — 99284 EMERGENCY DEPT VISIT MOD MDM: CPT

## 2024-07-11 PROCEDURE — 80053 COMPREHEN METABOLIC PANEL: CPT

## 2024-07-11 PROCEDURE — 96374 THER/PROPH/DIAG INJ IV PUSH: CPT

## 2024-07-11 PROCEDURE — 81001 URINALYSIS AUTO W/SCOPE: CPT

## 2024-07-11 PROCEDURE — 87086 URINE CULTURE/COLONY COUNT: CPT

## 2024-07-11 PROCEDURE — 96375 TX/PRO/DX INJ NEW DRUG ADDON: CPT

## 2024-07-11 PROCEDURE — 74176 CT ABD & PELVIS W/O CONTRAST: CPT

## 2024-07-11 PROCEDURE — 83605 ASSAY OF LACTIC ACID: CPT

## 2024-07-11 RX ORDER — KETOROLAC TROMETHAMINE 15 MG/ML
15 INJECTION, SOLUTION INTRAMUSCULAR; INTRAVENOUS ONCE
Status: COMPLETED | OUTPATIENT
Start: 2024-07-11 | End: 2024-07-11

## 2024-07-11 RX ORDER — 0.9 % SODIUM CHLORIDE 0.9 %
1000 INTRAVENOUS SOLUTION INTRAVENOUS ONCE
Status: COMPLETED | OUTPATIENT
Start: 2024-07-11 | End: 2024-07-11

## 2024-07-11 RX ADMIN — KETOROLAC TROMETHAMINE 15 MG: 15 INJECTION, SOLUTION INTRAMUSCULAR; INTRAVENOUS at 19:42

## 2024-07-11 RX ADMIN — SODIUM CHLORIDE 1000 ML: 9 INJECTION, SOLUTION INTRAVENOUS at 19:42

## 2024-07-11 RX ADMIN — CEFTRIAXONE SODIUM 2000 MG: 2 INJECTION, POWDER, FOR SOLUTION INTRAMUSCULAR; INTRAVENOUS at 22:18

## 2024-07-11 NOTE — ED PROVIDER NOTES
[facility] EMERGENCY DEPARTMENT  EMERGENCY DEPARTMENT ENCOUNTER      Pt Name: Kathy Valerio  MRN: [id number]  Birthdate 1940  Date of evaluation: 7/11/2024  Provider: Vinicio Gunderson DO  PCP: Arden Fontaine DO  Note Started: 6:34 PM EDT 7/11/24    CHIEF COMPLAINT       Chief Complaint   Patient presents with    Back Pain     No fall or injury, sees PT for this but worse today       HISTORY OF PRESENT ILLNESS: 1 or more Elements   History From: Patient  Limitations to history : None    Kathy Valerio is a 83 y.o. female who presents to the ED for evaluation of back pain. Patient states that she has been having ongoing right low back and flank pain going to her leg. Patient states that she is going to physical therapy which she states does seem to help. She states that over the past several days her pain seems to be worsening. She has no new falls or trauma. Patient has no saddle anthesia or incontinence. Patient has no fevers or chils.    Nursing Notes were all reviewed and agreed with or any disagreements were addressed in the HPI.    REVIEW OF SYSTEMS :    Positives and Pertinent negatives as per HPI.     SURGICAL HISTORY     Past Surgical History:   Procedure Laterality Date    BLADDER SURGERY      BREAST SURGERY Left     benign    DILATION AND CURETTAGE OF UTERUS      FRACTURE SURGERY      right arm    HYSTERECTOMY (CERVIX STATUS UNKNOWN)      KNEE CARTILAGE SURGERY      WRIST FRACTURE SURGERY Right 8/29/2022    RIGHT WRIST OPEN REDUCTION INTERNAL FIXATION VS. APPLICATION EXTERNAL FIXATOR DISTAL RADIUS FRACTURE performed by Esa Chong DO at [facility] OR       CURRENTMEDICATIONS       Discharge Medication List as of 7/12/2024 11:00 AM        CONTINUE these medications which have NOT CHANGED    Details   diclofenac (FLECTOR) 1.3 % PTCH patch Place 1 patch onto the skin 2 times daily, Disp-60 patch, R-2Normal      donepezil (ARICEPT) 5 MG tablet TAKE 1 TABLET BY

## 2024-07-12 VITALS
HEART RATE: 62 BPM | DIASTOLIC BLOOD PRESSURE: 68 MMHG | TEMPERATURE: 98.6 F | RESPIRATION RATE: 16 BRPM | SYSTOLIC BLOOD PRESSURE: 141 MMHG | OXYGEN SATURATION: 96 %

## 2024-07-12 RX ORDER — CEFDINIR 300 MG/1
300 CAPSULE ORAL 2 TIMES DAILY
Qty: 14 CAPSULE | Refills: 0 | Status: SHIPPED | OUTPATIENT
Start: 2024-07-12 | End: 2024-07-19

## 2024-07-12 NOTE — CONSULTS
Department of Orthopedic Surgery  Resident Consult Note    Reason for Consult: Bilateral sacral ala fractures    HISTORY OF PRESENT ILLNESS:       Patient is a 83 y.o. female who presents with low back pain.  Patient denies any new trauma, states her low back began bothering her little bit worse which she reported to the ED for.  Patient seen walking about ED without assistance.  Denies any other orthopedic injuries.    Past Medical History:        Diagnosis Date    Asthma     Hernia     Hypertension      Past Surgical History:        Procedure Laterality Date    BLADDER SURGERY      BREAST SURGERY Left     benign    DILATION AND CURETTAGE OF UTERUS      FRACTURE SURGERY      right arm    HYSTERECTOMY (CERVIX STATUS UNKNOWN)      KNEE CARTILAGE SURGERY      WRIST FRACTURE SURGERY Right 8/29/2022    RIGHT WRIST OPEN REDUCTION INTERNAL FIXATION VS. APPLICATION EXTERNAL FIXATOR DISTAL RADIUS FRACTURE performed by Esa Chong DO at [facility] OR     Current Medications:   No current facility-administered medications for this encounter.  Allergies:  Patient has no known allergies.    Social History:   TOBACCO:   reports that she quit smoking about 44 years ago. Her smoking use included cigarettes. She started smoking about 64 years ago. She has a 8.0 pack-year smoking history. She has never used smokeless tobacco.  ETOH:   reports no history of alcohol use.  DRUGS:   reports no history of drug use.  ACTIVITIES OF DAILY LIVING:    OCCUPATION:   Family History:       Problem Relation Age of Onset    Cancer Father     Lung Cancer Father     Cancer Sister     Kidney Cancer Sister     Cancer Brother     Kidney Cancer Brother        REVIEW OF SYSTEMS:  CONSTITUTIONAL:  negative for  fevers, chills  EYES:  negative for blurred vision, visual disturbance  HEENT:  negative for  hearing loss, voice change  RESPIRATORY:  negative for  dyspnea, wheezing  CARDIOVASCULAR:  negative for  chest pain, palpitations  GASTROINTESTINAL:

## 2024-07-12 NOTE — ED PROVIDER NOTES
7:15 AM EDT  I received this patient at sign out from Dr. Cortez.  I have discussed the patient's initial exam, treatment and plan of care with the out going physician.  I have introduced my self to the patient / family and have answered their questions to this point.  I have examined the patient myself and reviewed ordered tests / medications and  reviewed any available results to this point.  If a resident is involved in the Emergency Department care, I have discussed my findings and plan with them as well.  CT results have been reviewed    9:16 AM EDT  I have discussed with Ortho, they still state they are coming in to see the patient      9:16 AM EDT  Patient has been reexamined several times by me, she is conversant and talkative.  She is sitting in the hallway actually denies pain in her hips or pelvis area actually gets up and has been ambulating around the department multiple times asked by nursing staff to get back in her bed.  She has been eating breakfast tray.  She has no symptoms, no pain anywhere and states she feels fine and is asking when she can go back.         --------------------------------------------- PAST HISTORY ---------------------------------------------  Past Medical History:  has a past medical history of Asthma, Hernia, and Hypertension.    Past Surgical History:  has a past surgical history that includes Hysterectomy; Knee cartilage surgery; Bladder surgery; Dilation and curettage of uterus; fracture surgery; Breast surgery (Left); and Wrist fracture surgery (Right, 8/29/2022).    Social History:  reports that she quit smoking about 44 years ago. Her smoking use included cigarettes. She started smoking about 64 years ago. She has a 8.0 pack-year smoking history. She has never used smokeless tobacco. She reports that she does not drink alcohol and does not use drugs.    Family History: family history includes Cancer in her brother, father, and sister; Kidney Cancer in her brother and

## 2024-07-12 NOTE — PROGRESS NOTES
Page received    Imaging of the patient has been reviewed.      CT abdomen pelvis demonstrates bilateral nondisplaced sacral ala fractures.    The patient will be seen in person by orthopedics when able.      Emerson Escalante,

## 2024-07-12 NOTE — CARE COORDINATION
SS Note: Pt here for low back pain & Bilateral sacral ala fractures noted. Pt seen walking about ED without assistance. Ortho noted No acute orthopedic surgery intervention- Follow up outpatient with Dr. Chong as needed & Pain control PRN. Pt is d/c'd and needs ride home. SW spoke to pt who notes she lives alone & PTA is independent and drives. Ppt's nephew Janusz Post is  to Dr. Sravani Disla and they are both in [place]. Pt really has no one she can call. Pt has [employer]. SW called [employer] [phone] & [employer] [phone]. SW informed by both pt is inactive for coverage. DANY notified [facility] charge that once pt in ED lobby- will call [facility] for transport.     DANY called [employer] taxi and set up transport. Mj brought pt to ED lobby and DANY gave voucher to [employer].     ACE gave SW yellow copy of voucher. Pt was transported by [facility].  Electronically signed by ISABEL Mayen on 7/12/2024 at 12:50 PM

## 2024-07-14 ENCOUNTER — HOSPITAL ENCOUNTER (OUTPATIENT)
Age: 84
Setting detail: OBSERVATION
Discharge: NURSING, REHAB OR LONG-TERM CARE | End: 2024-07-17
Attending: EMERGENCY MEDICINE | Admitting: STUDENT IN AN ORGANIZED HEALTH CARE EDUCATION/TRAINING PROGRAM
Payer: MEDICARE

## 2024-07-14 ENCOUNTER — APPOINTMENT (OUTPATIENT)
Dept: CT IMAGING | Age: 84
End: 2024-07-14
Payer: MEDICARE

## 2024-07-14 DIAGNOSIS — R41.82 ALTERED MENTAL STATUS, UNSPECIFIED ALTERED MENTAL STATUS TYPE: Primary | ICD-10-CM

## 2024-07-14 PROBLEM — F02.80 DEMENTIA DUE TO ALZHEIMER'S DISEASE (HCC): Status: ACTIVE | Noted: 2024-07-14

## 2024-07-14 PROBLEM — G30.9 DEMENTIA DUE TO ALZHEIMER'S DISEASE (HCC): Status: ACTIVE | Noted: 2024-07-14

## 2024-07-14 LAB
ALBUMIN SERPL-MCNC: 3.8 G/DL (ref 3.5–5.2)
ALP SERPL-CCNC: 193 U/L (ref 35–104)
ALT SERPL-CCNC: 11 U/L (ref 0–32)
AMMONIA PLAS-SCNC: 22 UMOL/L (ref 11–51)
ANION GAP SERPL CALCULATED.3IONS-SCNC: 12 MMOL/L (ref 7–16)
AST SERPL-CCNC: 14 U/L (ref 0–31)
BACTERIA URNS QL MICRO: ABNORMAL
BASOPHILS # BLD: 0.04 K/UL (ref 0–0.2)
BASOPHILS NFR BLD: 1 % (ref 0–2)
BILIRUB SERPL-MCNC: 0.6 MG/DL (ref 0–1.2)
BILIRUB UR QL STRIP: NEGATIVE
BUN SERPL-MCNC: 21 MG/DL (ref 6–23)
CALCIUM SERPL-MCNC: 8.9 MG/DL (ref 8.6–10.2)
CHLORIDE SERPL-SCNC: 104 MMOL/L (ref 98–107)
CLARITY UR: CLEAR
CO2 SERPL-SCNC: 25 MMOL/L (ref 22–29)
COLOR UR: YELLOW
CREAT SERPL-MCNC: 0.7 MG/DL (ref 0.5–1)
EOSINOPHIL # BLD: 0.13 K/UL (ref 0.05–0.5)
EOSINOPHILS RELATIVE PERCENT: 2 % (ref 0–6)
EPI CELLS #/AREA URNS HPF: ABNORMAL /HPF
ERYTHROCYTE [DISTWIDTH] IN BLOOD BY AUTOMATED COUNT: 13.2 % (ref 11.5–15)
GFR, ESTIMATED: 87 ML/MIN/1.73M2
GLUCOSE SERPL-MCNC: 94 MG/DL (ref 74–99)
GLUCOSE UR STRIP-MCNC: NEGATIVE MG/DL
HCT VFR BLD AUTO: 42 % (ref 34–48)
HGB BLD-MCNC: 13.7 G/DL (ref 11.5–15.5)
HGB UR QL STRIP.AUTO: NEGATIVE
IMM GRANULOCYTES # BLD AUTO: <0.03 K/UL (ref 0–0.58)
IMM GRANULOCYTES NFR BLD: 0 % (ref 0–5)
KETONES UR STRIP-MCNC: NEGATIVE MG/DL
LEUKOCYTE ESTERASE UR QL STRIP: NEGATIVE
LYMPHOCYTES NFR BLD: 1.22 K/UL (ref 1.5–4)
LYMPHOCYTES RELATIVE PERCENT: 21 % (ref 20–42)
MCH RBC QN AUTO: 29 PG (ref 26–35)
MCHC RBC AUTO-ENTMCNC: 32.6 G/DL (ref 32–34.5)
MCV RBC AUTO: 89 FL (ref 80–99.9)
MICROORGANISM SPEC CULT: ABNORMAL
MONOCYTES NFR BLD: 0.51 K/UL (ref 0.1–0.95)
MONOCYTES NFR BLD: 9 % (ref 2–12)
NEUTROPHILS NFR BLD: 67 % (ref 43–80)
NEUTS SEG NFR BLD: 3.88 K/UL (ref 1.8–7.3)
NITRITE UR QL STRIP: NEGATIVE
PH UR STRIP: 5.5 [PH] (ref 5–9)
PLATELET # BLD AUTO: 207 K/UL (ref 130–450)
PMV BLD AUTO: 10.8 FL (ref 7–12)
POTASSIUM SERPL-SCNC: 3.5 MMOL/L (ref 3.5–5)
PROT SERPL-MCNC: 6.4 G/DL (ref 6.4–8.3)
PROT UR STRIP-MCNC: NEGATIVE MG/DL
RBC # BLD AUTO: 4.72 M/UL (ref 3.5–5.5)
RBC #/AREA URNS HPF: ABNORMAL /HPF
SERVICE CMNT-IMP: ABNORMAL
SODIUM SERPL-SCNC: 141 MMOL/L (ref 132–146)
SP GR UR STRIP: 1.01 (ref 1–1.03)
SPECIMEN DESCRIPTION: ABNORMAL
TROPONIN I SERPL HS-MCNC: 23 NG/L (ref 0–9)
TROPONIN I SERPL HS-MCNC: 23 NG/L (ref 0–9)
UROBILINOGEN UR STRIP-ACNC: 0.2 EU/DL (ref 0–1)
WBC #/AREA URNS HPF: ABNORMAL /HPF
WBC OTHER # BLD: 5.8 K/UL (ref 4.5–11.5)

## 2024-07-14 PROCEDURE — 81001 URINALYSIS AUTO W/SCOPE: CPT

## 2024-07-14 PROCEDURE — 85025 COMPLETE CBC W/AUTO DIFF WBC: CPT

## 2024-07-14 PROCEDURE — 82140 ASSAY OF AMMONIA: CPT

## 2024-07-14 PROCEDURE — 84484 ASSAY OF TROPONIN QUANT: CPT

## 2024-07-14 PROCEDURE — 80053 COMPREHEN METABOLIC PANEL: CPT

## 2024-07-14 PROCEDURE — 70450 CT HEAD/BRAIN W/O DYE: CPT

## 2024-07-14 PROCEDURE — 99222 1ST HOSP IP/OBS MODERATE 55: CPT | Performed by: STUDENT IN AN ORGANIZED HEALTH CARE EDUCATION/TRAINING PROGRAM

## 2024-07-14 PROCEDURE — 99285 EMERGENCY DEPT VISIT HI MDM: CPT

## 2024-07-14 PROCEDURE — 93005 ELECTROCARDIOGRAM TRACING: CPT | Performed by: EMERGENCY MEDICINE

## 2024-07-14 RX ORDER — LANOLIN ALCOHOL/MO/W.PET/CERES
1000 CREAM (GRAM) TOPICAL DAILY
Status: DISCONTINUED | OUTPATIENT
Start: 2024-07-15 | End: 2024-07-17 | Stop reason: HOSPADM

## 2024-07-14 RX ORDER — LISINOPRIL AND HYDROCHLOROTHIAZIDE 12.5; 1 MG/1; MG/1
1 TABLET ORAL DAILY
Status: DISCONTINUED | OUTPATIENT
Start: 2024-07-15 | End: 2024-07-14 | Stop reason: CLARIF

## 2024-07-14 RX ORDER — POLYETHYLENE GLYCOL 3350 17 G/17G
17 POWDER, FOR SOLUTION ORAL DAILY PRN
Status: DISCONTINUED | OUTPATIENT
Start: 2024-07-14 | End: 2024-07-17 | Stop reason: HOSPADM

## 2024-07-14 RX ORDER — ATORVASTATIN CALCIUM 10 MG/1
10 TABLET, FILM COATED ORAL DAILY
Status: DISCONTINUED | OUTPATIENT
Start: 2024-07-15 | End: 2024-07-17 | Stop reason: HOSPADM

## 2024-07-14 RX ORDER — FAMOTIDINE 20 MG/1
20 TABLET, FILM COATED ORAL 2 TIMES DAILY
Status: DISCONTINUED | OUTPATIENT
Start: 2024-07-15 | End: 2024-07-17 | Stop reason: HOSPADM

## 2024-07-14 RX ORDER — ACETAMINOPHEN 650 MG/1
650 SUPPOSITORY RECTAL EVERY 6 HOURS PRN
Status: DISCONTINUED | OUTPATIENT
Start: 2024-07-14 | End: 2024-07-17 | Stop reason: HOSPADM

## 2024-07-14 RX ORDER — TROSPIUM CHLORIDE 20 MG/1
20 TABLET, FILM COATED ORAL
Status: DISCONTINUED | OUTPATIENT
Start: 2024-07-15 | End: 2024-07-17 | Stop reason: HOSPADM

## 2024-07-14 RX ORDER — LISINOPRIL 10 MG/1
10 TABLET ORAL DAILY
Status: DISCONTINUED | OUTPATIENT
Start: 2024-07-15 | End: 2024-07-17 | Stop reason: HOSPADM

## 2024-07-14 RX ORDER — ALBUTEROL SULFATE 90 UG/1
2 AEROSOL, METERED RESPIRATORY (INHALATION) EVERY 6 HOURS PRN
Status: DISCONTINUED | OUTPATIENT
Start: 2024-07-14 | End: 2024-07-14 | Stop reason: CLARIF

## 2024-07-14 RX ORDER — SODIUM CHLORIDE 9 MG/ML
INJECTION, SOLUTION INTRAVENOUS PRN
Status: DISCONTINUED | OUTPATIENT
Start: 2024-07-14 | End: 2024-07-17 | Stop reason: HOSPADM

## 2024-07-14 RX ORDER — MAGNESIUM SULFATE IN WATER 40 MG/ML
2000 INJECTION, SOLUTION INTRAVENOUS PRN
Status: DISCONTINUED | OUTPATIENT
Start: 2024-07-14 | End: 2024-07-17 | Stop reason: HOSPADM

## 2024-07-14 RX ORDER — ENOXAPARIN SODIUM 100 MG/ML
40 INJECTION SUBCUTANEOUS DAILY
Status: DISCONTINUED | OUTPATIENT
Start: 2024-07-15 | End: 2024-07-17 | Stop reason: HOSPADM

## 2024-07-14 RX ORDER — ACETAMINOPHEN 325 MG/1
650 TABLET ORAL EVERY 6 HOURS PRN
Status: DISCONTINUED | OUTPATIENT
Start: 2024-07-14 | End: 2024-07-17 | Stop reason: HOSPADM

## 2024-07-14 RX ORDER — DONEPEZIL HYDROCHLORIDE 10 MG/1
5 TABLET, FILM COATED ORAL
Status: DISCONTINUED | OUTPATIENT
Start: 2024-07-15 | End: 2024-07-17 | Stop reason: HOSPADM

## 2024-07-14 RX ORDER — POTASSIUM CHLORIDE 20 MEQ/1
40 TABLET, EXTENDED RELEASE ORAL PRN
Status: DISCONTINUED | OUTPATIENT
Start: 2024-07-14 | End: 2024-07-17 | Stop reason: HOSPADM

## 2024-07-14 RX ORDER — SODIUM CHLORIDE 0.9 % (FLUSH) 0.9 %
5-40 SYRINGE (ML) INJECTION PRN
Status: DISCONTINUED | OUTPATIENT
Start: 2024-07-14 | End: 2024-07-17 | Stop reason: HOSPADM

## 2024-07-14 RX ORDER — ONDANSETRON 4 MG/1
4 TABLET, ORALLY DISINTEGRATING ORAL EVERY 8 HOURS PRN
Status: DISCONTINUED | OUTPATIENT
Start: 2024-07-14 | End: 2024-07-17 | Stop reason: HOSPADM

## 2024-07-14 RX ORDER — ASPIRIN 81 MG/1
81 TABLET, CHEWABLE ORAL DAILY
Status: DISCONTINUED | OUTPATIENT
Start: 2024-07-15 | End: 2024-07-17 | Stop reason: HOSPADM

## 2024-07-14 RX ORDER — ONDANSETRON 2 MG/ML
4 INJECTION INTRAMUSCULAR; INTRAVENOUS EVERY 6 HOURS PRN
Status: DISCONTINUED | OUTPATIENT
Start: 2024-07-14 | End: 2024-07-17 | Stop reason: HOSPADM

## 2024-07-14 RX ORDER — POTASSIUM CHLORIDE 7.45 MG/ML
10 INJECTION INTRAVENOUS PRN
Status: DISCONTINUED | OUTPATIENT
Start: 2024-07-14 | End: 2024-07-17 | Stop reason: HOSPADM

## 2024-07-14 RX ORDER — SODIUM CHLORIDE 0.9 % (FLUSH) 0.9 %
5-40 SYRINGE (ML) INJECTION EVERY 12 HOURS SCHEDULED
Status: DISCONTINUED | OUTPATIENT
Start: 2024-07-15 | End: 2024-07-17 | Stop reason: HOSPADM

## 2024-07-14 RX ORDER — ALBUTEROL SULFATE 2.5 MG/3ML
2.5 SOLUTION RESPIRATORY (INHALATION) EVERY 6 HOURS PRN
Status: DISCONTINUED | OUTPATIENT
Start: 2024-07-14 | End: 2024-07-17 | Stop reason: HOSPADM

## 2024-07-14 RX ORDER — HYDROCHLOROTHIAZIDE 12.5 MG/1
12.5 TABLET ORAL DAILY
Status: DISCONTINUED | OUTPATIENT
Start: 2024-07-15 | End: 2024-07-17 | Stop reason: HOSPADM

## 2024-07-14 ASSESSMENT — LIFESTYLE VARIABLES
HOW OFTEN DO YOU HAVE A DRINK CONTAINING ALCOHOL: NEVER
HOW MANY STANDARD DRINKS CONTAINING ALCOHOL DO YOU HAVE ON A TYPICAL DAY: PATIENT DOES NOT DRINK

## 2024-07-14 ASSESSMENT — PAIN - FUNCTIONAL ASSESSMENT
PAIN_FUNCTIONAL_ASSESSMENT: NONE - DENIES PAIN

## 2024-07-14 NOTE — ED PROVIDER NOTES
performed. Decision-making details documented in ED Course.    Risk  Decision regarding hospitalization.           ED Course as of 07/16/24 0700   Sun Jul 14, 2024 2014 EKG:  This EKG is signed and interpreted by Dr. Cass zarco MD    Rate: 57  Rhythm: Sinus  Interpretation: Sinus bradycardia, normal MT interval, normal QRS, normal axis, normal QT interval, no acute ST or T wave changes, nonspecific ST abnormalities  Comparison: No significant change when compared to prior EKG   [JA]      ED Course User Index  [JA] Liza Odell MD       Patient remained hemodynamically stable throughout ED course.     Current Discharge Medication List          Counseling:   The emergency provider has spoken with the patient and discussed today’s results, in addition to providing specific details for the plan of care and counseling regarding the diagnosis and prognosis.  Questions are answered at this time and they are agreeable with the plan.      --------------------------------- IMPRESSION AND DISPOSITION ---------------------------------    IMPRESSION  1. Altered mental status, unspecified altered mental status type        DISPOSITION  Disposition: Admit to med/surg floor  Patient condition is stable      NOTE: This report was transcribed using voice recognition software. Every effort was made to ensure accuracy; however, inadvertent computerized transcription errors may be present    Liza MACK MD, am the primary provider of this record        Liza Odell MD  07/16/24 0700

## 2024-07-15 LAB
ALBUMIN SERPL-MCNC: 3.5 G/DL (ref 3.5–5.2)
ALP SERPL-CCNC: 176 U/L (ref 35–104)
ALT SERPL-CCNC: 10 U/L (ref 0–32)
ANION GAP SERPL CALCULATED.3IONS-SCNC: 9 MMOL/L (ref 7–16)
AST SERPL-CCNC: 14 U/L (ref 0–31)
B.E.: 1.3 MMOL/L (ref -3–3)
BASOPHILS # BLD: 0.03 K/UL (ref 0–0.2)
BASOPHILS NFR BLD: 1 % (ref 0–2)
BILIRUB SERPL-MCNC: 0.6 MG/DL (ref 0–1.2)
BUN SERPL-MCNC: 16 MG/DL (ref 6–23)
CALCIUM SERPL-MCNC: 8.5 MG/DL (ref 8.6–10.2)
CHLORIDE SERPL-SCNC: 107 MMOL/L (ref 98–107)
CO2 SERPL-SCNC: 26 MMOL/L (ref 22–29)
COHB: 0.8 % (ref 0–1.5)
CREAT SERPL-MCNC: 0.6 MG/DL (ref 0.5–1)
CRITICAL: ABNORMAL
DATE ANALYZED: ABNORMAL
DATE OF COLLECTION: ABNORMAL
EKG ATRIAL RATE: 57 BPM
EKG P AXIS: 20 DEGREES
EKG P-R INTERVAL: 198 MS
EKG Q-T INTERVAL: 436 MS
EKG QRS DURATION: 86 MS
EKG QTC CALCULATION (BAZETT): 424 MS
EKG R AXIS: 41 DEGREES
EKG T AXIS: 35 DEGREES
EKG VENTRICULAR RATE: 57 BPM
EOSINOPHIL # BLD: 0.2 K/UL (ref 0.05–0.5)
EOSINOPHILS RELATIVE PERCENT: 4 % (ref 0–6)
ERYTHROCYTE [DISTWIDTH] IN BLOOD BY AUTOMATED COUNT: 13.2 % (ref 11.5–15)
GFR, ESTIMATED: 89 ML/MIN/1.73M2
GLUCOSE SERPL-MCNC: 94 MG/DL (ref 74–99)
HCO3: 24.6 MMOL/L (ref 22–26)
HCT VFR BLD AUTO: 39.2 % (ref 34–48)
HGB BLD-MCNC: 12.9 G/DL (ref 11.5–15.5)
HHB: 6 % (ref 0–5)
IMM GRANULOCYTES # BLD AUTO: <0.03 K/UL (ref 0–0.58)
IMM GRANULOCYTES NFR BLD: 0 % (ref 0–5)
LAB: ABNORMAL
LYMPHOCYTES NFR BLD: 1.32 K/UL (ref 1.5–4)
LYMPHOCYTES RELATIVE PERCENT: 25 % (ref 20–42)
Lab: 1108
MAGNESIUM SERPL-MCNC: 2 MG/DL (ref 1.6–2.6)
MCH RBC QN AUTO: 28.7 PG (ref 26–35)
MCHC RBC AUTO-ENTMCNC: 32.9 G/DL (ref 32–34.5)
MCV RBC AUTO: 87.3 FL (ref 80–99.9)
METHB: 0.3 % (ref 0–1.5)
MODE: ABNORMAL
MONOCYTES NFR BLD: 0.54 K/UL (ref 0.1–0.95)
MONOCYTES NFR BLD: 10 % (ref 2–12)
NEUTROPHILS NFR BLD: 60 % (ref 43–80)
NEUTS SEG NFR BLD: 3.16 K/UL (ref 1.8–7.3)
O2 CONTENT: 19.6 ML/DL
O2 SATURATION: 93.9 % (ref 92–98.5)
O2HB: 92.9 % (ref 94–97)
OPERATOR ID: ABNORMAL
PATIENT TEMP: 37 C
PCO2: 35.1 MMHG (ref 35–45)
PH BLOOD GAS: 7.46 (ref 7.35–7.45)
PLATELET # BLD AUTO: 207 K/UL (ref 130–450)
PMV BLD AUTO: 10.8 FL (ref 7–12)
PO2: 68.6 MMHG (ref 75–100)
POTASSIUM SERPL-SCNC: 3.2 MMOL/L (ref 3.5–5)
PROT SERPL-MCNC: 5.7 G/DL (ref 6.4–8.3)
RBC # BLD AUTO: 4.49 M/UL (ref 3.5–5.5)
SODIUM SERPL-SCNC: 142 MMOL/L (ref 132–146)
SOURCE, BLOOD GAS: ABNORMAL
THB: 15 G/DL (ref 11.5–16.5)
TIME ANALYZED: 1118
TSH SERPL DL<=0.05 MIU/L-ACNC: 1.69 UIU/ML (ref 0.27–4.2)
VIT B12 SERPL-MCNC: 372 PG/ML (ref 211–946)
WBC OTHER # BLD: 5.3 K/UL (ref 4.5–11.5)

## 2024-07-15 PROCEDURE — 97161 PT EVAL LOW COMPLEX 20 MIN: CPT

## 2024-07-15 PROCEDURE — G0378 HOSPITAL OBSERVATION PER HR: HCPCS

## 2024-07-15 PROCEDURE — 83735 ASSAY OF MAGNESIUM: CPT

## 2024-07-15 PROCEDURE — 97165 OT EVAL LOW COMPLEX 30 MIN: CPT

## 2024-07-15 PROCEDURE — 80053 COMPREHEN METABOLIC PANEL: CPT

## 2024-07-15 PROCEDURE — 96372 THER/PROPH/DIAG INJ SC/IM: CPT

## 2024-07-15 PROCEDURE — 36600 WITHDRAWAL OF ARTERIAL BLOOD: CPT

## 2024-07-15 PROCEDURE — 2500000003 HC RX 250 WO HCPCS: Performed by: STUDENT IN AN ORGANIZED HEALTH CARE EDUCATION/TRAINING PROGRAM

## 2024-07-15 PROCEDURE — 97530 THERAPEUTIC ACTIVITIES: CPT

## 2024-07-15 PROCEDURE — 84443 ASSAY THYROID STIM HORMONE: CPT

## 2024-07-15 PROCEDURE — 6370000000 HC RX 637 (ALT 250 FOR IP): Performed by: STUDENT IN AN ORGANIZED HEALTH CARE EDUCATION/TRAINING PROGRAM

## 2024-07-15 PROCEDURE — 82805 BLOOD GASES W/O2 SATURATION: CPT

## 2024-07-15 PROCEDURE — 6360000002 HC RX W HCPCS: Performed by: STUDENT IN AN ORGANIZED HEALTH CARE EDUCATION/TRAINING PROGRAM

## 2024-07-15 PROCEDURE — 85025 COMPLETE CBC W/AUTO DIFF WBC: CPT

## 2024-07-15 PROCEDURE — 2580000003 HC RX 258: Performed by: STUDENT IN AN ORGANIZED HEALTH CARE EDUCATION/TRAINING PROGRAM

## 2024-07-15 PROCEDURE — 93010 ELECTROCARDIOGRAM REPORT: CPT | Performed by: INTERNAL MEDICINE

## 2024-07-15 PROCEDURE — 99233 SBSQ HOSP IP/OBS HIGH 50: CPT | Performed by: INTERNAL MEDICINE

## 2024-07-15 PROCEDURE — 82607 VITAMIN B-12: CPT

## 2024-07-15 RX ADMIN — ENOXAPARIN SODIUM 40 MG: 100 INJECTION SUBCUTANEOUS at 10:46

## 2024-07-15 RX ADMIN — HYDROCHLOROTHIAZIDE 12.5 MG: 12.5 TABLET ORAL at 10:46

## 2024-07-15 RX ADMIN — MICONAZOLE NITRATE: 20 POWDER TOPICAL at 02:00

## 2024-07-15 RX ADMIN — CYANOCOBALAMIN TAB 1000 MCG 1000 MCG: 1000 TAB at 10:48

## 2024-07-15 RX ADMIN — MICONAZOLE NITRATE: 20 POWDER TOPICAL at 20:10

## 2024-07-15 RX ADMIN — LISINOPRIL 10 MG: 10 TABLET ORAL at 10:47

## 2024-07-15 RX ADMIN — FAMOTIDINE 20 MG: 20 TABLET ORAL at 10:48

## 2024-07-15 RX ADMIN — TROSPIUM CHLORIDE 20 MG: 20 TABLET, FILM COATED ORAL at 06:42

## 2024-07-15 RX ADMIN — SODIUM CHLORIDE, PRESERVATIVE FREE 10 ML: 5 INJECTION INTRAVENOUS at 10:48

## 2024-07-15 RX ADMIN — FAMOTIDINE 20 MG: 20 TABLET ORAL at 20:10

## 2024-07-15 RX ADMIN — MICONAZOLE NITRATE: 20 POWDER TOPICAL at 10:48

## 2024-07-15 RX ADMIN — ASPIRIN 81 MG CHEWABLE TABLET 81 MG: 81 TABLET CHEWABLE at 10:48

## 2024-07-15 RX ADMIN — ATORVASTATIN CALCIUM 10 MG: 10 TABLET, FILM COATED ORAL at 20:10

## 2024-07-15 RX ADMIN — ACETAMINOPHEN 650 MG: 325 TABLET ORAL at 20:13

## 2024-07-15 RX ADMIN — SODIUM CHLORIDE, PRESERVATIVE FREE 10 ML: 5 INJECTION INTRAVENOUS at 20:10

## 2024-07-15 RX ADMIN — POTASSIUM CHLORIDE 40 MEQ: 1500 TABLET, EXTENDED RELEASE ORAL at 05:28

## 2024-07-15 RX ADMIN — TROSPIUM CHLORIDE 20 MG: 20 TABLET, FILM COATED ORAL at 17:07

## 2024-07-15 RX ADMIN — DONEPEZIL HYDROCHLORIDE 5 MG: 10 TABLET, FILM COATED ORAL at 10:47

## 2024-07-15 ASSESSMENT — PAIN DESCRIPTION - PAIN TYPE: TYPE: CHRONIC PAIN

## 2024-07-15 ASSESSMENT — PAIN DESCRIPTION - LOCATION: LOCATION: BACK;HIP

## 2024-07-15 ASSESSMENT — PAIN SCALES - GENERAL
PAINLEVEL_OUTOF10: 3
PAINLEVEL_OUTOF10: 0

## 2024-07-15 ASSESSMENT — PAIN DESCRIPTION - FREQUENCY: FREQUENCY: CONTINUOUS

## 2024-07-15 ASSESSMENT — PAIN DESCRIPTION - ONSET: ONSET: ON-GOING

## 2024-07-15 ASSESSMENT — PAIN - FUNCTIONAL ASSESSMENT
PAIN_FUNCTIONAL_ASSESSMENT: ACTIVITIES ARE NOT PREVENTED
PAIN_FUNCTIONAL_ASSESSMENT: NONE - DENIES PAIN

## 2024-07-15 ASSESSMENT — PAIN DESCRIPTION - DESCRIPTORS: DESCRIPTORS: ACHING

## 2024-07-15 ASSESSMENT — PAIN DESCRIPTION - ORIENTATION: ORIENTATION: RIGHT;LOWER

## 2024-07-15 NOTE — H&P
[facility] History and Physical      CHIEF COMPLAINT: Altered mental status    History of Present Illness: 83-year-old lady with past medical history significant for hypertension, and asthma presents to ED with altered mental status.  Patient has dementia at baseline but her confusion and altered mental status is worsening rapidly during the past few weeks.  Per EMS she was parked in the middle of the gas pump at [employer] [place] she gets and was confused, she did not know where she lives and what was the name.   was contacted EMS called the history about increasingly worsening memory issues.  As per history he has been wearing the same clothes for several days.  She was also forgetting how to start a car.  During workup in ED she was alert but confused.  Denies any chest pain, belly pain, nausea vomiting diarrhea, cough or shortness of breath, no lightheadedness or dizziness, no focal deficits.  As per history she is alone and does not have any close family nearby.    Informant(s) for H&P:    REVIEW OF SYSTEMS:  A comprehensive review of systems was negative except for: what is in the HPI      PMH:  Past Medical History:   Diagnosis Date    Asthma     Hernia     Hypertension        Surgical History:  Past Surgical History:   Procedure Laterality Date    BLADDER SURGERY      BREAST SURGERY Left     benign    DILATION AND CURETTAGE OF UTERUS      FRACTURE SURGERY      right arm    HYSTERECTOMY (CERVIX STATUS UNKNOWN)      KNEE CARTILAGE SURGERY      WRIST FRACTURE SURGERY Right 8/29/2022    RIGHT WRIST OPEN REDUCTION INTERNAL FIXATION VS. APPLICATION EXTERNAL FIXATOR DISTAL RADIUS FRACTURE performed by Esa Chong DO at [email or web address] OR       Medications Prior to Admission:    Prior to Admission medications    Medication Sig Start Date End Date Taking? Authorizing Provider   cefdinir (OMNICEF) 300 MG capsule Take 1 capsule by mouth 2 times daily for 7 days 7/12/24 7/19/24  Dana

## 2024-07-15 NOTE — ED NOTES
ED to Inpatient Handoff Report    Notified 5th floor that electronic handoff available and patient ready for transport to room 517.    Safety Risks: Memory Problems    Patient in Restraints: no    Constant Observer or Patient : no    Telemetry Monitoring Ordered :No           Order to transfer to unit without monitor:N/A    Last MEWS: 1 Time completed: 0027    Deterioration Index Score:   Predictive Model Details          21 (Normal)  Factor Value    Calculated 7/15/2024 00:30 67% Age 83 years old    Deterioration Index Model 22% Systolic 167     3% Potassium 3.5 mmol/L     3% Sodium 141 mmol/L     3% Pulse 60     1% Pulse oximetry 98 %     1% Hematocrit 42.0 %     0% Temperature 97.9 °F (36.6 °C)     0% WBC count 5.8 k/uL     0% Respiratory rate 16        Vitals:    07/14/24 1851 07/14/24 2124 07/14/24 2231 07/15/24 0027   BP: (!) 153/76 (!) 160/64 (!) 159/62 (!) 167/71   Pulse: 66 72 67 60   Resp: 18 16 16 16   Temp: 97.8 °F (36.6 °C) 97.8 °F (36.6 °C) 97.8 °F (36.6 °C) 97.9 °F (36.6 °C)   TempSrc: Oral Oral Oral Oral   SpO2: 95% 97% 95% 98%   Weight: 83.5 kg (184 lb)      Height: 1.676 m (5' 5.98\")            Opportunity for questions and clarification was provided.

## 2024-07-15 NOTE — ED NOTES
ED to Inpatient Handoff Report    Notified 5th floor that electronic handoff available and patient ready for transport to room 517  .    Safety Risks: Memory Problems    Patient in Restraints: no    Constant Observer or Patient : no    Telemetry Monitoring Ordered :No           Order to transfer to unit without monitor:N/A    Last MEWS: 1 Time completed: 0027    Deterioration Index Score:   Predictive Model Details          21 (Normal)  Factor Value    Calculated 7/15/2024 00:35 67% Age 83 years old    Deterioration Index Model 22% Systolic 167     3% Potassium 3.5 mmol/L     3% Sodium 141 mmol/L     3% Pulse 60     1% Pulse oximetry 98 %     1% Hematocrit 42.0 %     0% Temperature 97.9 °F (36.6 °C)     0% WBC count 5.8 k/uL     0% Respiratory rate 16        Vitals:    07/14/24 1851 07/14/24 2124 07/14/24 2231 07/15/24 0027   BP: (!) 153/76 (!) 160/64 (!) 159/62 (!) 167/71   Pulse: 66 72 67 60   Resp: 18 16 16 16   Temp: 97.8 °F (36.6 °C) 97.8 °F (36.6 °C) 97.8 °F (36.6 °C) 97.9 °F (36.6 °C)   TempSrc: Oral Oral Oral Oral   SpO2: 95% 97% 95% 98%   Weight: 83.5 kg (184 lb)      Height: 1.676 m (5' 5.98\")        Patient has a [place]:  was consulted for placement due to patient's confusion: currently is awake, alert and oriented. Pleasant and cooperative.    Opportunity for questions and clarification was provided.

## 2024-07-15 NOTE — CARE COORDINATION
Introduced my self and provided explanation of CM role to patient. Patient is awake, alert, and aware of current diagnosis and discharge planning. Patient is confused, HX of dementia.  Able to answer most questions appropriately. Patient is from home alone independently. Patient states she has been getting confused and driving places then forgetting where she was going. Patient states the only family she has around is her nephew and he lives in [place]. PCP is Dr. Fontaine. Preferred pharmacy is [facility]. PT eval 17/24. Patient states she goes to outpatient rehab and drives herself there. Attempted to call nephew Janusz, no answer message left. Will need to continue planning once Janusz is reached.   Electronically signed by Myrna Gonzales RN on 7/15/2024 at 3:34 PM

## 2024-07-16 PROCEDURE — 99233 SBSQ HOSP IP/OBS HIGH 50: CPT | Performed by: INTERNAL MEDICINE

## 2024-07-16 PROCEDURE — 6370000000 HC RX 637 (ALT 250 FOR IP): Performed by: STUDENT IN AN ORGANIZED HEALTH CARE EDUCATION/TRAINING PROGRAM

## 2024-07-16 PROCEDURE — 96372 THER/PROPH/DIAG INJ SC/IM: CPT

## 2024-07-16 PROCEDURE — G0378 HOSPITAL OBSERVATION PER HR: HCPCS

## 2024-07-16 PROCEDURE — 2580000003 HC RX 258: Performed by: STUDENT IN AN ORGANIZED HEALTH CARE EDUCATION/TRAINING PROGRAM

## 2024-07-16 PROCEDURE — 97535 SELF CARE MNGMENT TRAINING: CPT

## 2024-07-16 PROCEDURE — 6360000002 HC RX W HCPCS: Performed by: STUDENT IN AN ORGANIZED HEALTH CARE EDUCATION/TRAINING PROGRAM

## 2024-07-16 RX ADMIN — ENOXAPARIN SODIUM 40 MG: 100 INJECTION SUBCUTANEOUS at 08:20

## 2024-07-16 RX ADMIN — FAMOTIDINE 20 MG: 20 TABLET ORAL at 08:21

## 2024-07-16 RX ADMIN — MICONAZOLE NITRATE: 20 POWDER TOPICAL at 22:06

## 2024-07-16 RX ADMIN — ATORVASTATIN CALCIUM 10 MG: 10 TABLET, FILM COATED ORAL at 22:06

## 2024-07-16 RX ADMIN — LISINOPRIL 10 MG: 10 TABLET ORAL at 08:21

## 2024-07-16 RX ADMIN — HYDROCHLOROTHIAZIDE 12.5 MG: 12.5 TABLET ORAL at 08:21

## 2024-07-16 RX ADMIN — MICONAZOLE NITRATE: 20 POWDER TOPICAL at 08:23

## 2024-07-16 RX ADMIN — SODIUM CHLORIDE, PRESERVATIVE FREE 10 ML: 5 INJECTION INTRAVENOUS at 08:20

## 2024-07-16 RX ADMIN — TROSPIUM CHLORIDE 20 MG: 20 TABLET, FILM COATED ORAL at 15:48

## 2024-07-16 RX ADMIN — CYANOCOBALAMIN TAB 1000 MCG 1000 MCG: 1000 TAB at 08:21

## 2024-07-16 RX ADMIN — TROSPIUM CHLORIDE 20 MG: 20 TABLET, FILM COATED ORAL at 06:21

## 2024-07-16 RX ADMIN — ASPIRIN 81 MG CHEWABLE TABLET 81 MG: 81 TABLET CHEWABLE at 08:21

## 2024-07-16 RX ADMIN — DONEPEZIL HYDROCHLORIDE 5 MG: 10 TABLET, FILM COATED ORAL at 08:21

## 2024-07-16 RX ADMIN — FAMOTIDINE 20 MG: 20 TABLET ORAL at 22:06

## 2024-07-16 NOTE — CARE COORDINATION
Updated plan of care. Patient remains confused, lives alone, only family in a nephew in [place]. Per physician, patient can be discharged home with [facility]. Called Myrna with [facility], they cannot accept due to safety. Spoke to nephew Janusz who states he is concerned for his Aunt to be discharged home alone and wishes to have options for discharged emailed to him.  emails a AL list, transport list, meal program list, direction home, ect. Spoke again with Nephew Janusz who states he is agreeable to trying to skill the patient and he will fly home to fill out paperwork to get the patient into assisted living. Yuly with [facility] called and a referral was made. Precert will be started today. Primary notified of plan.   Electronically signed by Myrna Gonzales RN on 7/16/2024 at 2:31 PM    UPDATE: Auth approved. MARINE, demos, transport form, PASSR completed and in soft chart. [facility] cannot transport until 0730 in the morning. Family, liaison, and nursing notified.   Electronically signed by Myrna Gonzales RN on 7/16/2024 at 3:50 PM

## 2024-07-16 NOTE — DISCHARGE INSTR - COC
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    Traumatic closed displaced fracture of distal end of radius, right, initial encounter S52.501A    Closed fracture of right distal radius S52.501A    Primary osteoarthritis of both knees M17.0    Alzheimer's disease, unspecified G30.9    Sarcopenia M62.84    Contusion of flank S30.1XXA    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, F02.80       Isolation/Infection:   Isolation            No Isolation          Patient Infection Status       Infection Onset Added Last Indicated Last Indicated By Review Planned Expiration Resolved Resolved By    None active    Resolved    COVID-19 24 POCT COVID-19, Antigen   24 Infection             Nurse Assessment:  Last Vital Signs: BP (!) 177/86   Pulse 94   Temp 98.5 °F (36.9 °C) (Oral)   Resp 20   Ht 1.676 m (5' 6\")   Wt 84 kg (185 lb 3 oz)   LMP  (LMP Unknown)   SpO2 98%   BMI 29.89 kg/m²     Last documented pain score (0-10 scale): Pain Level: 0  Last Weight:   Wt Readings from Last 1 Encounters:   24 84 kg (185 lb 3 oz)     Mental Status:  disoriented and alert short term memory loss/forgetful    IV Access:  - None    Nursing Mobility/ADLs:  Walking   Independent  Transfer  Assisted  Bathing  Independent  Dressing  Independent  Toileting  Assisted  Feeding  Independent  Med Admin  Independent  Med Delivery   whole    Wound Care Documentation and Therapy:  Incision 22 Radial Distal;Right (Active)   Number of days: 687

## 2024-07-16 NOTE — ACP (ADVANCE CARE PLANNING)
Advance Care Planning   Healthcare Decision Maker:    Primary Decision Maker: Janusz Post - Niece/Nephew - [phone]    Secondary Decision Maker: Sravani Shabazz - Other Relative - [phone]    Click here to complete Healthcare Decision Makers including selection of the Healthcare Decision Maker Relationship (ie \"Primary\").  Today we documented Decision Maker(s) consistent with Legal Next of Kin hierarchy.

## 2024-07-16 NOTE — PLAN OF CARE
Problem: Safety - Adult  Goal: Free from fall injury  7/16/2024 0050 by Cici Garcia, RN  Outcome: Progressing     Problem: Skin/Tissue Integrity  Goal: Absence of new skin breakdown  Description: 1.  Monitor for areas of redness and/or skin breakdown  2.  Assess vascular access sites hourly  3.  Every 4-6 hours minimum:  Change oxygen saturation probe site  4.  Every 4-6 hours:  If on nasal continuous positive airway pressure, respiratory therapy assess nares and determine need for appliance change or resting period.  7/16/2024 0050 by Cici Garcia, RN  Outcome: Progressing     Problem: Pain  Goal: Verbalizes/displays adequate comfort level or baseline comfort level  Outcome: Progressing     
  Problem: Safety - Adult  Goal: Free from fall injury  Outcome: Progressing     Problem: Skin/Tissue Integrity  Goal: Absence of new skin breakdown  Description: 1.  Monitor for areas of redness and/or skin breakdown  2.  Assess vascular access sites hourly  3.  Every 4-6 hours minimum:  Change oxygen saturation probe site  4.  Every 4-6 hours:  If on nasal continuous positive airway pressure, respiratory therapy assess nares and determine need for appliance change or resting period.  Outcome: Progressing     
Patient

## 2024-07-17 VITALS
RESPIRATION RATE: 20 BRPM | OXYGEN SATURATION: 98 % | WEIGHT: 179 LBS | BODY MASS INDEX: 28.77 KG/M2 | SYSTOLIC BLOOD PRESSURE: 147 MMHG | DIASTOLIC BLOOD PRESSURE: 69 MMHG | HEART RATE: 72 BPM | TEMPERATURE: 97.4 F | HEIGHT: 66 IN

## 2024-07-17 PROCEDURE — G0378 HOSPITAL OBSERVATION PER HR: HCPCS

## 2024-07-17 PROCEDURE — 99238 HOSP IP/OBS DSCHRG MGMT 30/<: CPT | Performed by: INTERNAL MEDICINE

## 2024-07-17 NOTE — PROGRESS NOTES
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      miconazole   Topical BID    sodium chloride flush  5-40 mL IntraVENous 2 times per day    enoxaparin  40 mg SubCUTAneous Daily    famotidine  20 mg Oral BID    aspirin  81 mg Oral Daily    atorvastatin  10 mg Oral Daily    donepezil  5 mg Oral Daily with breakfast    trospium  20 mg Oral BID AC    vitamin B-12  1,000 mcg Oral Daily    lisinopril  10 mg Oral Daily    And    hydroCHLOROthiazide  12.5 mg Oral Daily     sodium chloride flush, 5-40 mL, PRN  sodium chloride, , PRN  potassium chloride, 40 mEq, PRN   Or  potassium alternative oral replacement, 40 mEq, PRN   Or  potassium chloride, 10 mEq, PRN  magnesium sulfate, 2,000 mg, PRN  ondansetron, 4 mg, Q8H PRN   Or  ondansetron, 4 mg, Q6H PRN  polyethylene glycol, 17 g, Daily PRN  acetaminophen, 650 mg, Q6H PRN   Or  acetaminophen, 650 mg, Q6H PRN  albuterol, 2.5 mg, Q6H PRN         Objective:    BP (!) 177/86   Pulse 94   Temp 98.5 °F (36.9 °C) (Oral)   Resp 20   Ht 1.676 m (5' 6\")   Wt 84 kg (185 lb 3 oz)   LMP  (LMP Unknown)   SpO2 98%   BMI 29.89 kg/m²     General Appearance: alert and oriented to person, place and time and in no acute distress  Skin: warm and dry  Head: normocephalic and atraumatic  Eyes: pupils equal, round, extraocular eye movements intact, conjunctivae normal  Pulmonary/Chest: clear to auscultation bilaterally- no wheezes, rales or rhonchi, normal air movement, no respiratory distress  Cardiovascular: normal rate, normal S1 and S2   Abdomen: soft, non-tender, non-distended, normal bowel sounds,   Extremities: no cyanosis, no clubbing and no edema  Neurologic: no cranial 
       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    ROS: denies fever, chills, cp, sob, n/v, HA unless stated above.      miconazole   Topical BID    sodium chloride flush  5-40 mL IntraVENous 2 times per day    enoxaparin  40 mg SubCUTAneous Daily    famotidine  20 mg Oral BID    aspirin  81 mg Oral Daily    atorvastatin  10 mg Oral Daily    donepezil  5 mg Oral Daily with breakfast    trospium  20 mg Oral BID AC    vitamin B-12  1,000 mcg Oral Daily    lisinopril  10 mg Oral Daily    And    hydroCHLOROthiazide  12.5 mg Oral Daily     sodium chloride flush, 5-40 mL, PRN  sodium chloride, , PRN  potassium chloride, 40 mEq, PRN   Or  potassium alternative oral replacement, 40 mEq, PRN   Or  potassium chloride, 10 mEq, PRN  magnesium sulfate, 2,000 mg, PRN  ondansetron, 4 mg, Q8H PRN   Or  ondansetron, 4 mg, Q6H PRN  polyethylene glycol, 17 g, Daily PRN  acetaminophen, 650 mg, Q6H PRN   Or  acetaminophen, 650 mg, Q6H PRN  albuterol, 2.5 mg, Q6H PRN         Objective:    BP (!) 184/72   Pulse 65   Temp 98 °F (36.7 °C) (Oral)   Resp 16   Ht 1.676 m (5' 6\")   Wt 82.1 kg (180 lb 14.4 oz)   LMP  (LMP Unknown)   SpO2 96%   BMI 29.20 kg/m²     General Appearance: alert and oriented to person, place and time and in no acute distress  Skin: warm and dry  Head: normocephalic and atraumatic  Eyes: pupils equal, round, extraocular eye movements intact, conjunctivae normal  Pulmonary/Chest: clear to auscultation bilaterally- no wheezes, rales or rhonchi, normal air movement, no respiratory distress  Cardiovascular: normal rate, normal S1 and S2   Abdomen: soft, non-tender, non-distended, normal bowel sounds,   Extremities: no cyanosis, no clubbing and 
  [facility] Quality Flow/Interdisciplinary Rounds Progress Note        Quality Flow Rounds held on July 15, 2024    Disciplines Attending:  Bedside Nurse, , , and Nursing Unit Leadership    Kathy Valerio was admitted on 7/14/2024  6:38 PM    Anticipated Discharge Date:       Disposition:    Maciej Score:  Maciej Scale Score: 18    Readmission Risk              Risk of Unplanned Readmission:  0           Discussed patient goal for the day, patient clinical progression, and barriers to discharge.  The following Goal(s) of the Day/Commitment(s) have been identified:  Diagnostics - Report Results and Labs - Report Results      Louisa Dunbar RN  July 15, 2024        
  [facility] Quality Flow/Interdisciplinary Rounds Progress Note        Quality Flow Rounds held on July 16, 2024    Disciplines Attending:  Bedside Nurse, , , and Nursing Unit Leadership    Ktahy Valerio was admitted on 7/14/2024  6:38 PM    Anticipated Discharge Date:       Disposition:    Maciej Score:  Maciej Scale Score: 19    Readmission Risk              Risk of Unplanned Readmission:  0           Discussed patient goal for the day, patient clinical progression, and barriers to discharge.  The following Goal(s) of the Day/Commitment(s) have been identified:  Diagnostics - Report Results and Labs - Report Results      Louisa Dunbar RN  July 16, 2024        
4 Eyes Skin Assessment     NAME:  Kathy Valerio  YOB: 1940  MEDICAL RECORD NUMBER:  [id number]    The patient is being assessed for  Admission    I agree that at least one RN has performed a thorough Head to Toe Skin Assessment on the patient. ALL assessment sites listed below have been assessed.      Areas assessed by both nurses:    Head, Face, Ears, Shoulders, Back, Chest, Arms, Elbows, Hands, Sacrum. Buttock, Coccyx, Ischium, Legs. Feet and Heels, and Under Medical Devices         Does the Patient have a Wound? No noted wound(s)       Maciej Prevention initiated by RN: Yes  Wound Care Orders initiated by RN: No    Pressure Injury (Stage 3,4, Unstageable, DTI, NWPT, and Complex wounds) if present, place Wound referral order by RN under : No    New Ostomies, if present place, Ostomy referral order under : No     Nurse 1 eSignature: Electronically signed by Tania Peterson RN on 7/15/24 at 11:37 AM EDT    **SHARE this note so that the co-signing nurse can place an eSignature**    Nurse 2 eSignature: Electronically signed by Hair Ospina RN on 7/15/24 at 11:38 AM EDT   
Patient is concerned about living at home, as Patient stated, \"I keep forgetting things.\" Patient is grieving the loss of independence as she understands she will not be able to drive again. Patient stated that she only has a Nephew who lives in [place]. Patient is with the understanding that her Nephew will move her to [place]. Patient's mima is very important to her, as she finds strength in God. Patient received prayer and prayer card. Patient was easy to converse with and at times seemed to be confused. Continued spiritual support.  
Physical Therapy  Facility/Department: [address] MED SURG  Physical Therapy Initial Assessment    Name: Kathy Valerio \"Nadia\"  : 1940  MRN: [id number]  Date of Service: 7/15/2024    Attending Provider:  Thelma Ash DO    Evaluating PT:  Dani Maldonado Jr., P.T.    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  Pt ambulated with no AD PTA.    OBJECTIVE:   Initial Evaluation  Date: 7/15/24 Treatment Short Term/ Long Term   Goals   Was pt agreeable to Eval/treatment? yes     Does pt have pain? No c/o pain     Bed Mobility  Rolling: supervision  Supine to sit: supervision  Sit to supine: supervision  Scooting: supervision  Independent   Transfers Sit to stand: SBA  Stand to sit: SBA  Stand pivot: MIN A  supervision   Ambulation   15+35+25 feet with no AD MIN A  250 feet with AAD if needed supervision   Stair negotiation: ascended and descended NA  8 steps with 1 rail SBA   AM-PAC 6 Clicks        BLE ROM is WFL.   BLE strength is grossly 4/5 to 4+/5.   Sensation:  Pt denies numbness and tingling to extremities  Balance: sitting is supervision and standing with SBA  Endurance: fair    ASSESSMENT:    Conditions Requiring Skilled Therapeutic Intervention:    [x]Decreased strength     []Decreased ROM  [x]Decreased functional mobility  [x]Decreased balance   [x]Decreased endurance   []Decreased posture  []Decreased sensation  []Decreased coordination   []Decreased vision  [x]Decreased safety awareness   []Increased pain       Comments:  Pt was found sitting on EOB and was agreeable to PT.  She was confused at times, but was able to follow directions and participated with PT.  While on EOB she donned a depends brief and while pulling up as she was standing was 
Pt does not know her home meds for me to review. She states she gets them all from [facility] in [place] (phone number is [phone]) and open at 9am on Monday. Will have to call and get list faxed to us in the morning.   Electronically signed by Kaylin Webber RN on 7/15/2024 at 1:46 AM    
Report was called to facility, family made aware and discussed at length patients plan of care.   
SPIRITUAL HEALTH SERVICES - [facility] Encounter    Name: Kathy Valerio                  Referral: Routine Visit    Sacraments  Anointed (Last Rites): Yes  Apostolic [place]: No  Confession: No  Communion: No     Assessment:  Patient receptive to  visit.      Intervention:   provided spiritual support and sacramental ministry for patient.     Outcome:  Patient expressed gratitude for visit.    Plan:  Chaplains will remain available to offer spiritual and emotional support as needed.      Electronically signed by Chaplain Jana, on 7/15/2024 at 1:45 PM.  Spiritual Care Department  [facility]  [phone]   
Verified home medications with patients pharmacy; [facility]. Electronically signed by Tania Peterson RN on 7/15/2024 at 9:45 AM    
safety/participation in ADL/IADL tasks  * Therapeutic exercise to improve motor endurance, ROM, and functional strength for ADLs/functional transfers  * Therapeutic activities to facilitate/challenge dynamic balance, stand tolerance for increased safety and independence with ADLs  * Therapeutic activities to facilitate gross/fine motor skills for increased independence with ADLs  * Positioning to improve skin integrity, interaction with environment and functional independence  * Delirium prevention/treatment    Recommended Adaptive Equipment: TBD      Home Living: Lives alone, apartment, 2 floor, No steps to enter. 6-7 steps with rial to 2nd floor apt. Elevator access available, per pt's report.         Prior Level of Function: Pt reports being modified independent with ADLs and with IADLs. Ambulated independently PTA.     Driving: Yes     Pain Level: No c/o pain     Cognition: A&O: 3/4; Follows simple step directions   Memory: impaired; conversational confusion noted. Difficulty finding words at times.    Sequencing: fair   Problem solving: fair-/poor    Judgement/safety: fair-/poor      Functional Assessment: AM-PAC Daily Activity Raw Score: 16/24   Initial Eval Status  Date: 7/15/24   Treatment Status  Date:  STGs = LTGs  Time frame: 10-14 days   Feeding Supervision     Independent    Grooming Contact Guard Assist   Standing at sink     Supervision    UB Dressing Minimal Assist    Supervision    LB Dressing Minimal Assist   To pull up on socks seated EOB    Supervision    Bathing Moderate Assist     Supervision    Toileting Moderate Assist     Supervision    Bed Mobility  Supine to sit: Stand by Assist   Sit to supine:  Stand by Assist     Supine to sit: Supervision   Sit to supine: Supervision    Functional Transfers Sit to stand: Stand by Assist   Stand to sit: Stand by Assist     Transfer training with verbal cues for hand placement to improve safety.     Supervision    Functional Mobility Minimal Assist 
Assist without AD to/from bathroom.     SBA without device in room and into the hallway.   Pt using chair rail for support at times.     Supervision with use of AAD PRN    Balance Sitting:     Static: fair plus     Dynamic: fair plus   Standing: fair     Stand balance SBA during ADL.   Sitting:     Static: good    Dynamic: good  Standing: fair plus with fww    Activity Tolerance fair   Fair   Slight SOB with exertion.  O2 saturation 95%.   Increase standing tolerance >3 minutes for improved engagement with functional transfers and indep in ADLs      Visual/  Perceptual Glasses: N/A       NA    UE ROM/Strength        Right UE:   AROM: WFL   Strength: 4-/5  -good  and wfl FMC/dexterity noted during ADL tasks     Left UE:   AROM : WFL   Strength: 4-/5  -good  and wfl FMC/dexterity noted during ADL tasks      Improve overall B UE strength for participation in functional tasks        Comments:  Pt laying in the bed.  Pleasant and cooperative.  Completed functional transfers and ADL.  She required cues for safety awareness at times.  Pt anxious to go home.  She returned to bed at the end of the session. Alarm activated.     Education/treatment:  ADL retraining with facilitation of movement to increase self care skills. Therapeutic activity to address balance and endurance for ADL and transfers.  Pt education of daily orientation.       Pt has made  progress towards set goals.       Time In: 844  Time Out: 859     Min Units   Therapeutic Ex 13133     Therapeutic Activities [id number] 5    ADL/Self Care 36204 10 1   Orthotic Management [id number]     Neuro Re-Ed [id number]     Non-Billable Time     TOTAL TIMED TREATMENT 15 1         Sarita MALIK/L [id number]

## 2024-07-17 NOTE — DISCHARGE SUMMARY
administration of intravenous contrast. Automated exposure control, iterative reconstruction, and/or weight based adjustment of the mA/kV was utilized to reduce the radiation dose to as low as reasonably achievable. COMPARISON: 08/16/2022 HISTORY: ORDERING SYSTEM PROVIDED HISTORY: AMS TECHNOLOGIST PROVIDED HISTORY: Has a \"code stroke\" or \"stroke alert\" been called?->No Reason for exam:->AMS Decision Support Exception - unselect if not a suspected or confirmed emergency medical condition->Emergency Medical Condition (MA) FINDINGS: BRAIN/VENTRICLES: There is no acute intracranial hemorrhage, mass effect or midline shift. No abnormal extra-axial fluid collection.  The gray-white differentiation is maintained without evidence of an acute infarct. There is prominence of the ventricles and sulci due to global parenchymal volume loss. There are nonspecific areas of hypoattenuation within the periventricular and subcortical white matter, which likely represent chronic microvascular ischemic change. There is moderate cerebral atrophy with periventricular white matter changes. ORBITS: The visualized portion of the orbits demonstrate no acute abnormality. SINUSES: The visualized paranasal sinuses and mastoid air cells demonstrate no acute abnormality. SOFT TISSUES/SKULL: No acute abnormality of the visualized skull or soft tissues.     1. No acute intracranial abnormality. 2. Moderate cerebral atrophy with periventricular white matter changes. 3. Nonspecific white matter changes, likely related to chronic microvascular ischemic disease.       Patient Instructions:      Medication List        CONTINUE taking these medications      albuterol sulfate  (90 Base) MCG/ACT inhaler  Commonly known as: PROVENTIL;VENTOLIN;PROAIR  Inhale 2 puffs into the lungs every 6 hours as needed for Wheezing     aspirin 81 MG chewable tablet     atorvastatin 10 MG tablet  Commonly known as: Lipitor  Take 1 tablet by mouth daily     CENTRUM

## 2024-10-23 ENCOUNTER — TELEPHONE (OUTPATIENT)
Dept: FAMILY MEDICINE CLINIC | Age: 84
End: 2024-10-23

## (undated) DEVICE — SHEET,DRAPE,40X58,STERILE: Brand: MEDLINE

## (undated) DEVICE — YANKAUER,BULB TIP,W/O VENT,RIGID,STERILE: Brand: MEDLINE

## (undated) DEVICE — INTENDED FOR TISSUE SEPARATION, AND OTHER PROCEDURES THAT REQUIRE A SHARP SURGICAL BLADE TO PUNCTURE OR CUT.: Brand: BARD-PARKER ® STAINLESS STEEL BLADES

## (undated) DEVICE — DOUBLE BASIN SET: Brand: MEDLINE INDUSTRIES, INC.

## (undated) DEVICE — BANDAGE COMPR W4INXL5YD WHT BGE POLY COT M E WRP WV HK AND

## (undated) DEVICE — GOWN,SIRUS,FABRNF,XL,20/CS: Brand: MEDLINE

## (undated) DEVICE — PADDING,UNDERCAST,COTTON, 4"X4YD STERILE: Brand: MEDLINE

## (undated) DEVICE — COVER,LIGHT HANDLE,FLX,1/PK: Brand: MEDLINE INDUSTRIES, INC.

## (undated) DEVICE — WAX SURG 2.5GM HEMSTAT BNE BEESWAX PARAFFIN ISO PALMITATE

## (undated) DEVICE — K WIRE FIX L150MM DIA1.6MM S STL TRCR PNT
Type: IMPLANTABLE DEVICE | Site: WRIST | Status: NON-FUNCTIONAL
Removed: 2022-08-29

## (undated) DEVICE — MARKER,SKIN,WI/RULER AND LABELS: Brand: MEDLINE

## (undated) DEVICE — 3M™ IOBAN™ 2 ANTIMICROBIAL INCISE DRAPE 6640EZ: Brand: IOBAN™ 2

## (undated) DEVICE — BIT DRL L100MM DIA2MM ST QUIK CPL NONRADIOPAQUE W/O STP

## (undated) DEVICE — SPONGE,LAP,12"X12",XR,ST,5/PK,40PK/CS: Brand: MEDLINE

## (undated) DEVICE — ELECTRODE PT RET AD L9FT HI MOIST COND ADH HYDRGEL CORDED

## (undated) DEVICE — GOWN,SIRUS,POLYRNF,RAGLAN,XL,ST,30/CS: Brand: MEDLINE

## (undated) DEVICE — DRAPE C ARM W41XL65IN UNIV W/ CLP AND RUBBERBAND

## (undated) DEVICE — 1010 S-DRAPE TOWEL DRAPE 10/BX: Brand: STERI-DRAPE™

## (undated) DEVICE — APPLICATOR MEDICATED 26 CC SOLUTION HI LT ORNG CHLORAPREP

## (undated) DEVICE — 3M™ STERI-DRAPE™ U-DRAPE 1015: Brand: STERI-DRAPE™

## (undated) DEVICE — TOWEL,OR,DSP,ST,BLUE,STD,6/PK,12PK/CS: Brand: MEDLINE

## (undated) DEVICE — BIT DRL L110MM DIA1.8MM QUIK CPL CALIB W/O STP REUSE

## (undated) DEVICE — SCREW BNE L16MM DIA2.7MM CORT S STL ST T8 STARDRV RECESS
Type: IMPLANTABLE DEVICE | Site: WRIST | Status: NON-FUNCTIONAL
Removed: 2022-08-29

## (undated) DEVICE — PACK EXT II SIRUS

## (undated) DEVICE — DRESSING GZ W1XL8IN COT XRFRM N ADH OVERWRAP CURAD

## (undated) DEVICE — GAUZE,SPONGE,4"X4",16PLY,STRL,LF,10/TRAY: Brand: MEDLINE

## (undated) DEVICE — NDL CNTR 40CT FM MAG: Brand: MEDLINE INDUSTRIES, INC.

## (undated) DEVICE — GAUZE,SPONGE,4"X4",16PLY,XRAY,STRL,LF: Brand: MEDLINE

## (undated) DEVICE — SET MAJOR INSTR ORTHO

## (undated) DEVICE — TUBING, SUCTION, 1/4" X 10', STRAIGHT: Brand: MEDLINE

## (undated) DEVICE — SOLUTION IV IRRIG 250ML ST LF 0.9% SODIUM 2F7122

## (undated) DEVICE — BANDAGE COMPR W4XL108IN WHT LAYERED NO CLSR SYN RUB ESMARCH

## (undated) DEVICE — PENCIL ES L3M BTTN SWCH HOLSTER W/ BLDE ELECTRD EDGE

## (undated) DEVICE — Z DISCONTINUED USE 2275686 GLOVE SURG SZ 8 L12IN FNGR THK13MIL WHT ISOLEX POLYISOPRENE

## (undated) DEVICE — SYRINGE IRRIG 60ML SFT PLIABLE BLB EZ TO GRP 1 HND USE W/